# Patient Record
Sex: MALE | Race: WHITE | Employment: FULL TIME | URBAN - METROPOLITAN AREA
[De-identification: names, ages, dates, MRNs, and addresses within clinical notes are randomized per-mention and may not be internally consistent; named-entity substitution may affect disease eponyms.]

---

## 2016-06-15 LAB — DIABETIC RETINOPATHY: NORMAL

## 2017-02-21 RX ORDER — LISINOPRIL 2.5 MG/1
TABLET ORAL
Qty: 30 TABLET | Refills: 2 | Status: SHIPPED | OUTPATIENT
Start: 2017-02-21 | End: 2017-03-14 | Stop reason: SDUPTHER

## 2017-03-14 ENCOUNTER — OFFICE VISIT (OUTPATIENT)
Dept: INTERNAL MEDICINE CLINIC | Age: 52
End: 2017-03-14

## 2017-03-14 VITALS
OXYGEN SATURATION: 98 % | BODY MASS INDEX: 30.48 KG/M2 | RESPIRATION RATE: 17 BRPM | DIASTOLIC BLOOD PRESSURE: 76 MMHG | HEART RATE: 61 BPM | HEIGHT: 72 IN | WEIGHT: 225 LBS | SYSTOLIC BLOOD PRESSURE: 134 MMHG

## 2017-03-14 DIAGNOSIS — J45.20 MILD INTERMITTENT ASTHMA WITHOUT COMPLICATION: ICD-10-CM

## 2017-03-14 DIAGNOSIS — I10 ESSENTIAL HYPERTENSION: ICD-10-CM

## 2017-03-14 DIAGNOSIS — K76.0 FATTY LIVER DISEASE, NONALCOHOLIC: ICD-10-CM

## 2017-03-14 DIAGNOSIS — E11.21 DIABETES MELLITUS WITH NEPHROPATHY (HCC): Primary | ICD-10-CM

## 2017-03-14 PROCEDURE — 99214 OFFICE O/P EST MOD 30 MIN: CPT | Performed by: INTERNAL MEDICINE

## 2017-03-14 RX ORDER — ATENOLOL 25 MG/1
TABLET ORAL
Qty: 90 TABLET | Refills: 1 | Status: SHIPPED | OUTPATIENT
Start: 2017-03-14 | End: 2017-06-23 | Stop reason: SDUPTHER

## 2017-03-14 RX ORDER — LISINOPRIL 2.5 MG/1
TABLET ORAL
Qty: 90 TABLET | Refills: 1 | Status: SHIPPED | OUTPATIENT
Start: 2017-03-14 | End: 2017-06-23 | Stop reason: SDUPTHER

## 2017-03-14 ASSESSMENT — PATIENT HEALTH QUESTIONNAIRE - PHQ9
1. LITTLE INTEREST OR PLEASURE IN DOING THINGS: 0
SUM OF ALL RESPONSES TO PHQ QUESTIONS 1-9: 0
2. FEELING DOWN, DEPRESSED OR HOPELESS: 0
SUM OF ALL RESPONSES TO PHQ9 QUESTIONS 1 & 2: 0

## 2017-03-15 ENCOUNTER — TELEPHONE (OUTPATIENT)
Dept: INTERNAL MEDICINE CLINIC | Age: 52
End: 2017-03-15

## 2017-03-15 DIAGNOSIS — E11.21 DIABETES MELLITUS WITH NEPHROPATHY (HCC): ICD-10-CM

## 2017-03-15 DIAGNOSIS — K76.0 FATTY LIVER DISEASE, NONALCOHOLIC: ICD-10-CM

## 2017-03-15 LAB
A/G RATIO: 2 (ref 1.1–2.2)
ALBUMIN SERPL-MCNC: 4.7 G/DL (ref 3.4–5)
ALP BLD-CCNC: 97 U/L (ref 40–129)
ALT SERPL-CCNC: 58 U/L (ref 10–40)
ANION GAP SERPL CALCULATED.3IONS-SCNC: 12 MMOL/L (ref 3–16)
AST SERPL-CCNC: 27 U/L (ref 15–37)
BASOPHILS ABSOLUTE: 0.1 K/UL (ref 0–0.2)
BASOPHILS RELATIVE PERCENT: 1.3 %
BILIRUB SERPL-MCNC: 0.6 MG/DL (ref 0–1)
BUN BLDV-MCNC: 16 MG/DL (ref 7–20)
CALCIUM SERPL-MCNC: 9.8 MG/DL (ref 8.3–10.6)
CHLORIDE BLD-SCNC: 103 MMOL/L (ref 99–110)
CHOLESTEROL, TOTAL: 119 MG/DL (ref 0–199)
CO2: 28 MMOL/L (ref 21–32)
CREAT SERPL-MCNC: 1 MG/DL (ref 0.9–1.3)
CREATININE URINE: 60.9 MG/DL (ref 39–259)
EOSINOPHILS ABSOLUTE: 0.4 K/UL (ref 0–0.6)
EOSINOPHILS RELATIVE PERCENT: 6 %
GFR AFRICAN AMERICAN: >60
GFR NON-AFRICAN AMERICAN: >60
GLOBULIN: 2.4 G/DL
GLUCOSE BLD-MCNC: 138 MG/DL (ref 70–99)
HCT VFR BLD CALC: 49.2 % (ref 40.5–52.5)
HDLC SERPL-MCNC: 41 MG/DL (ref 40–60)
HEMOGLOBIN: 16.3 G/DL (ref 13.5–17.5)
LDL CHOLESTEROL CALCULATED: 56 MG/DL
LYMPHOCYTES ABSOLUTE: 2.1 K/UL (ref 1–5.1)
LYMPHOCYTES RELATIVE PERCENT: 29.5 %
MCH RBC QN AUTO: 30.8 PG (ref 26–34)
MCHC RBC AUTO-ENTMCNC: 33.2 G/DL (ref 31–36)
MCV RBC AUTO: 92.8 FL (ref 80–100)
MICROALBUMIN UR-MCNC: <1.2 MG/DL
MICROALBUMIN/CREAT UR-RTO: NORMAL MG/G (ref 0–30)
MONOCYTES ABSOLUTE: 0.6 K/UL (ref 0–1.3)
MONOCYTES RELATIVE PERCENT: 9 %
NEUTROPHILS ABSOLUTE: 3.8 K/UL (ref 1.7–7.7)
NEUTROPHILS RELATIVE PERCENT: 54.2 %
PDW BLD-RTO: 12.9 % (ref 12.4–15.4)
PLATELET # BLD: 240 K/UL (ref 135–450)
PMV BLD AUTO: 8.6 FL (ref 5–10.5)
POTASSIUM SERPL-SCNC: 5.1 MMOL/L (ref 3.5–5.1)
RBC # BLD: 5.3 M/UL (ref 4.2–5.9)
SODIUM BLD-SCNC: 143 MMOL/L (ref 136–145)
TOTAL PROTEIN: 7.1 G/DL (ref 6.4–8.2)
TRIGL SERPL-MCNC: 111 MG/DL (ref 0–150)
VLDLC SERPL CALC-MCNC: 22 MG/DL
WBC # BLD: 7 K/UL (ref 4–11)

## 2017-03-16 LAB
ESTIMATED AVERAGE GLUCOSE: 157.1 MG/DL
HBA1C MFR BLD: 7.1 %

## 2017-06-14 DIAGNOSIS — E11.21 DIABETES MELLITUS WITH NEPHROPATHY (HCC): ICD-10-CM

## 2017-06-23 DIAGNOSIS — I10 ESSENTIAL HYPERTENSION: ICD-10-CM

## 2017-06-23 DIAGNOSIS — E11.21 DIABETES MELLITUS WITH NEPHROPATHY (HCC): ICD-10-CM

## 2017-06-23 RX ORDER — LISINOPRIL 2.5 MG/1
TABLET ORAL
Qty: 90 TABLET | Refills: 1 | Status: SHIPPED | OUTPATIENT
Start: 2017-06-23 | End: 2017-11-01 | Stop reason: SDUPTHER

## 2017-06-23 RX ORDER — ATENOLOL 25 MG/1
TABLET ORAL
Qty: 90 TABLET | Refills: 1 | Status: SHIPPED | OUTPATIENT
Start: 2017-06-23 | End: 2017-11-01 | Stop reason: SDUPTHER

## 2017-10-30 ENCOUNTER — TELEPHONE (OUTPATIENT)
Dept: INTERNAL MEDICINE CLINIC | Age: 52
End: 2017-10-30

## 2017-10-30 DIAGNOSIS — E11.21 TYPE 2 DIABETES MELLITUS WITH DIABETIC NEPHROPATHY, WITHOUT LONG-TERM CURRENT USE OF INSULIN (HCC): ICD-10-CM

## 2017-10-30 DIAGNOSIS — E11.21 DIABETES MELLITUS WITH NEPHROPATHY (HCC): ICD-10-CM

## 2017-10-30 RX ORDER — GLIPIZIDE 5 MG/1
5 TABLET ORAL
Qty: 60 TABLET | Refills: 3
Start: 2017-10-30 | End: 2017-11-01 | Stop reason: SDUPTHER

## 2017-10-30 NOTE — TELEPHONE ENCOUNTER
As discussed w Renetta Cortez, advise pt to incr glucotrol fr 5daily to 5mg bid and metformin fr 500mg BID to 1000mg BID then need f/u appt this week in office

## 2017-11-01 ENCOUNTER — OFFICE VISIT (OUTPATIENT)
Dept: INTERNAL MEDICINE CLINIC | Age: 52
End: 2017-11-01

## 2017-11-01 VITALS
SYSTOLIC BLOOD PRESSURE: 122 MMHG | OXYGEN SATURATION: 93 % | RESPIRATION RATE: 16 BRPM | BODY MASS INDEX: 29.53 KG/M2 | WEIGHT: 218 LBS | HEIGHT: 72 IN | DIASTOLIC BLOOD PRESSURE: 78 MMHG | HEART RATE: 77 BPM

## 2017-11-01 DIAGNOSIS — I10 ESSENTIAL HYPERTENSION: ICD-10-CM

## 2017-11-01 DIAGNOSIS — J45.20 MILD INTERMITTENT ASTHMA WITHOUT COMPLICATION: ICD-10-CM

## 2017-11-01 DIAGNOSIS — E11.21 DIABETES MELLITUS WITH NEPHROPATHY (HCC): ICD-10-CM

## 2017-11-01 DIAGNOSIS — Z00.00 ROUTINE GENERAL MEDICAL EXAMINATION AT A HEALTH CARE FACILITY: Primary | ICD-10-CM

## 2017-11-01 DIAGNOSIS — E11.21 TYPE 2 DIABETES MELLITUS WITH DIABETIC NEPHROPATHY, WITHOUT LONG-TERM CURRENT USE OF INSULIN (HCC): ICD-10-CM

## 2017-11-01 DIAGNOSIS — Z23 NEED FOR IMMUNIZATION AGAINST INFLUENZA: ICD-10-CM

## 2017-11-01 DIAGNOSIS — K76.0 FATTY LIVER DISEASE, NONALCOHOLIC: ICD-10-CM

## 2017-11-01 LAB
A/G RATIO: 2.2 (CALC) (ref 0.8–2.6)
ALBUMIN SERPL-MCNC: 5 GM/DL (ref 3.5–5.2)
ALP BLD-CCNC: 89 U/L (ref 23–144)
ALT SERPL-CCNC: 47 U/L (ref 0–60)
AST SERPL-CCNC: 30 U/L (ref 0–46)
BASOPHILS ABSOLUTE: 0 K/MM3 (ref 0–0.3)
BASOPHILS RELATIVE PERCENT: 0.6 % (ref 0–2)
BILIRUB SERPL-MCNC: 0.7 MG/DL (ref 0–1.2)
BUN / CREAT RATIO: 17 (CALC) (ref 7–25)
BUN BLDV-MCNC: 17 MG/DL (ref 3–29)
CALCIUM SERPL-MCNC: 9.7 MG/DL (ref 8.5–10.5)
CHLORIDE BLD-SCNC: 99 MEQ/L (ref 96–110)
CHOLESTEROL, TOTAL: 118 MG/DL
CO2: 29 MEQ/L (ref 19–32)
COPY(IES) SENT TO:: NORMAL
CREAT SERPL-MCNC: 1 MG/DL
EOSINOPHILS ABSOLUTE: 0.2 K/MM3 (ref 0–0.6)
EOSINOPHILS RELATIVE PERCENT: 2.4 % (ref 0–7)
GFR SERPL CREATININE-BSD FRML MDRD: 88 ML/MIN/1.73M2
GLOBULIN: 2.3 GM/DL (CALC) (ref 1.9–3.6)
GLUCOSE BLD-MCNC: 110 MG/DL
GLUCOSE BLD-MCNC: 92 MG/DL
HBA1C MFR BLD: 6.6 %
HCT VFR BLD CALC: 48.8 % (ref 41–50)
HDLC SERPL-MCNC: 43 MG/DL
HEMOGLOBIN: 16.6 G/DL (ref 13.8–17.2)
LDL CHOLESTEROL: 65 MG/DL (CALC)
LEUKOCYTES, UA: 7.1 K/MM3 (ref 3.8–10.8)
LYMPHOCYTES ABSOLUTE: 1.8 K/MM3 (ref 0.9–4.1)
LYMPHOCYTES RELATIVE PERCENT: 25.3 % (ref 18–47)
MCH RBC QN AUTO: 30.6 PG (ref 27–33)
MCHC RBC AUTO-ENTMCNC: 34 G/DL (ref 32–36)
MCV RBC AUTO: 89.9 FL (ref 80–100)
MONOCYTES ABSOLUTE: 0.7 K/MM3 (ref 0.2–1.1)
MONOCYTES RELATIVE PERCENT: 9.2 % (ref 0–14)
NEUTROPHILS ABSOLUTE: 4.4 K/MM3 (ref 1.5–7.8)
PDW BLD-RTO: 12.5 % (ref 9–15)
PLATELET # BLD: 255 K/MM3 (ref 130–400)
POTASSIUM SERPL-SCNC: 4.7 MEQ/L (ref 3.4–5.3)
RBC # BLD: 5.43 M/MM3 (ref 4.4–5.8)
SEGMENTED NEUTROPHILS RELATIVE PERCENT: 62.5 % (ref 40–75)
SODIUM BLD-SCNC: 141 MEQ/L (ref 135–148)
TOTAL PROTEIN: 7.3 GM/DL (ref 6–8.3)
TRIGL SERPL-MCNC: 49 MG/DL
VLDLC SERPL CALC-MCNC: 10 MG/DL (CALC) (ref 4–32)

## 2017-11-01 PROCEDURE — 90471 IMMUNIZATION ADMIN: CPT | Performed by: INTERNAL MEDICINE

## 2017-11-01 PROCEDURE — 82962 GLUCOSE BLOOD TEST: CPT | Performed by: INTERNAL MEDICINE

## 2017-11-01 PROCEDURE — 99396 PREV VISIT EST AGE 40-64: CPT | Performed by: INTERNAL MEDICINE

## 2017-11-01 PROCEDURE — 90688 IIV4 VACCINE SPLT 0.5 ML IM: CPT | Performed by: INTERNAL MEDICINE

## 2017-11-01 PROCEDURE — 83036 HEMOGLOBIN GLYCOSYLATED A1C: CPT | Performed by: INTERNAL MEDICINE

## 2017-11-01 RX ORDER — ATENOLOL 25 MG/1
TABLET ORAL
Qty: 90 TABLET | Refills: 1 | Status: SHIPPED | OUTPATIENT
Start: 2017-11-01 | End: 2017-11-07 | Stop reason: SDUPTHER

## 2017-11-01 RX ORDER — ALBUTEROL SULFATE 90 UG/1
2 AEROSOL, METERED RESPIRATORY (INHALATION) EVERY 6 HOURS PRN
Qty: 1 INHALER | Refills: 3 | Status: SHIPPED | OUTPATIENT
Start: 2017-11-01 | End: 2018-12-07 | Stop reason: SDUPTHER

## 2017-11-01 RX ORDER — GLIPIZIDE 5 MG/1
2.5 TABLET ORAL DAILY PRN
Qty: 45 TABLET | Refills: 1 | Status: SHIPPED | OUTPATIENT
Start: 2017-11-01 | End: 2019-09-17 | Stop reason: SDUPTHER

## 2017-11-01 RX ORDER — LISINOPRIL 2.5 MG/1
TABLET ORAL
Qty: 90 TABLET | Refills: 1 | Status: SHIPPED | OUTPATIENT
Start: 2017-11-01 | End: 2017-11-07 | Stop reason: SDUPTHER

## 2017-11-01 NOTE — PROGRESS NOTES
rales.   Abdominal: Soft, non-tender. Bowel sounds and aorta are normal. There is no organomegaly, mass or bruit. Musculoskeletal: Normal range of motion, no synovitis. He exhibits no edema. Neurological: He is alert and oriented to person, place, and time. No cranial nerve deficit. Coordination normal.   Skin: Skin is warm, dry and intact. Psychiatric: He has a normal mood and affect. His speech is normal and behavior is normal. Judgment, cognition and memory are normal.         Assessment/Plan:    Christiano Guevara was seen today for diabetes. Diagnoses and all orders for this visit:    Routine general medical examination at a health care facility - Overall general medical exam appears benign, other assessments as noted and testing is as noted below. Type 2 diabetes mellitus with diabetic nephropathy, without long-term current use of insulin ([address]- OUR GLC AND A1C POCT AR OK, 92 AND 6.6. SUSPECT HIS GLUCOMETER NEEDS TO BE TITRATED AND HE WILL CHECK W HIS PHARMACY. F/U LAB TODAY, CONT CURRENT MED DOSING, ADVISED MORE STRICT ATTENTION TO DM DIET  -     POCT glycosylated hemoglobin (Hb A1C)  -     POCT Glucose  -     Lipid Panel  -     Comprehensive Metabolic Panel  -     CBC Auto Differential  -     metFORMIN (GLUCOPHAGE) 500 MG tablet; Take 1 tablet by mouth 2 times daily (with meals) TAKE ONE TABLET BY MOUTH TWICE A DAY (WITH MEALS)  -     glipiZIDE (GLUCOTROL) 5 MG tablet; Take 0.5 tablets by mouth daily as needed (for sugars >200)    Essential hypertension - Blood pressure stable and will continue current regimen. Will plan periodic monitoring of renal function, electrolytes, lipid profile. -     Comprehensive Metabolic Panel  -     CBC Auto Differential  -     lisinopril (PRINIVIL;ZESTRIL) 2.5 MG tablet; TAKE ONE TABLET BY MOUTH DAILY  -     atenolol (TENORMIN) 25 MG tablet; TAKE ONE TABLET BY MOUTH DAILY.     Fatty liver disease, nonalcoholic- MONITOR LFTS AND CONT LOW FAT DIET  -     Comprehensive

## 2017-11-07 DIAGNOSIS — I10 ESSENTIAL HYPERTENSION: ICD-10-CM

## 2017-11-07 DIAGNOSIS — E11.21 TYPE 2 DIABETES MELLITUS WITH DIABETIC NEPHROPATHY, WITHOUT LONG-TERM CURRENT USE OF INSULIN (HCC): ICD-10-CM

## 2017-11-07 DIAGNOSIS — E11.21 DIABETES MELLITUS WITH NEPHROPATHY (HCC): ICD-10-CM

## 2017-11-07 RX ORDER — ATENOLOL 25 MG/1
TABLET ORAL
Qty: 90 TABLET | Refills: 1 | Status: SHIPPED | OUTPATIENT
Start: 2017-11-07 | End: 2018-11-29 | Stop reason: SDUPTHER

## 2017-11-07 RX ORDER — LISINOPRIL 2.5 MG/1
TABLET ORAL
Qty: 90 TABLET | Refills: 1 | Status: SHIPPED | OUTPATIENT
Start: 2017-11-07 | End: 2018-05-17 | Stop reason: SDUPTHER

## 2018-02-01 ENCOUNTER — OFFICE VISIT (OUTPATIENT)
Dept: SURGERY | Age: 53
End: 2018-02-01

## 2018-02-01 VITALS
HEART RATE: 68 BPM | WEIGHT: 229 LBS | OXYGEN SATURATION: 93 % | BODY MASS INDEX: 31.06 KG/M2 | SYSTOLIC BLOOD PRESSURE: 110 MMHG | DIASTOLIC BLOOD PRESSURE: 60 MMHG

## 2018-02-01 DIAGNOSIS — L84 CALLUS OF FOOT: Primary | ICD-10-CM

## 2018-02-01 PROCEDURE — 99213 OFFICE O/P EST LOW 20 MIN: CPT | Performed by: SURGERY

## 2018-02-01 PROCEDURE — 1036F TOBACCO NON-USER: CPT | Performed by: SURGERY

## 2018-02-01 PROCEDURE — G8484 FLU IMMUNIZE NO ADMIN: HCPCS | Performed by: SURGERY

## 2018-02-01 PROCEDURE — G8427 DOCREV CUR MEDS BY ELIG CLIN: HCPCS | Performed by: SURGERY

## 2018-02-01 PROCEDURE — G8417 CALC BMI ABV UP PARAM F/U: HCPCS | Performed by: SURGERY

## 2018-02-01 PROCEDURE — 3017F COLORECTAL CA SCREEN DOC REV: CPT | Performed by: SURGERY

## 2018-02-01 ASSESSMENT — ENCOUNTER SYMPTOMS
HEARTBURN: 0
NAUSEA: 0
HEMOPTYSIS: 0
COUGH: 0

## 2018-05-02 ENCOUNTER — OFFICE VISIT (OUTPATIENT)
Dept: INTERNAL MEDICINE CLINIC | Age: 53
End: 2018-05-02

## 2018-05-02 VITALS
DIASTOLIC BLOOD PRESSURE: 82 MMHG | RESPIRATION RATE: 17 BRPM | SYSTOLIC BLOOD PRESSURE: 132 MMHG | HEART RATE: 76 BPM | WEIGHT: 215 LBS | BODY MASS INDEX: 29.16 KG/M2 | OXYGEN SATURATION: 98 %

## 2018-05-02 DIAGNOSIS — E11.21 TYPE 2 DIABETES MELLITUS WITH DIABETIC NEPHROPATHY, WITHOUT LONG-TERM CURRENT USE OF INSULIN (HCC): Primary | ICD-10-CM

## 2018-05-02 DIAGNOSIS — I10 ESSENTIAL HYPERTENSION: ICD-10-CM

## 2018-05-02 DIAGNOSIS — Z12.12 SCREENING FOR COLORECTAL CANCER: ICD-10-CM

## 2018-05-02 DIAGNOSIS — K76.0 FATTY LIVER DISEASE, NONALCOHOLIC: ICD-10-CM

## 2018-05-02 DIAGNOSIS — Z12.11 SCREENING FOR COLORECTAL CANCER: ICD-10-CM

## 2018-05-02 DIAGNOSIS — J45.20 MILD INTERMITTENT ASTHMA WITHOUT COMPLICATION: ICD-10-CM

## 2018-05-02 DIAGNOSIS — Z12.5 SCREENING FOR MALIGNANT NEOPLASM OF PROSTATE: ICD-10-CM

## 2018-05-02 PROCEDURE — 3017F COLORECTAL CA SCREEN DOC REV: CPT | Performed by: INTERNAL MEDICINE

## 2018-05-02 PROCEDURE — G8427 DOCREV CUR MEDS BY ELIG CLIN: HCPCS | Performed by: INTERNAL MEDICINE

## 2018-05-02 PROCEDURE — G8417 CALC BMI ABV UP PARAM F/U: HCPCS | Performed by: INTERNAL MEDICINE

## 2018-05-02 PROCEDURE — 2022F DILAT RTA XM EVC RTNOPTHY: CPT | Performed by: INTERNAL MEDICINE

## 2018-05-02 PROCEDURE — 3046F HEMOGLOBIN A1C LEVEL >9.0%: CPT | Performed by: INTERNAL MEDICINE

## 2018-05-02 PROCEDURE — 99214 OFFICE O/P EST MOD 30 MIN: CPT | Performed by: INTERNAL MEDICINE

## 2018-05-02 PROCEDURE — 1036F TOBACCO NON-USER: CPT | Performed by: INTERNAL MEDICINE

## 2018-05-02 ASSESSMENT — PATIENT HEALTH QUESTIONNAIRE - PHQ9
1. LITTLE INTEREST OR PLEASURE IN DOING THINGS: 0
2. FEELING DOWN, DEPRESSED OR HOPELESS: 0
1. LITTLE INTEREST OR PLEASURE IN DOING THINGS: 0
SUM OF ALL RESPONSES TO PHQ9 QUESTIONS 1 & 2: 0
SUM OF ALL RESPONSES TO PHQ QUESTIONS 1-9: 0
SUM OF ALL RESPONSES TO PHQ QUESTIONS 1-9: 0
SUM OF ALL RESPONSES TO PHQ9 QUESTIONS 1 & 2: 0

## 2018-05-03 DIAGNOSIS — Z12.5 SCREENING FOR MALIGNANT NEOPLASM OF PROSTATE: ICD-10-CM

## 2018-05-03 DIAGNOSIS — E11.21 TYPE 2 DIABETES MELLITUS WITH DIABETIC NEPHROPATHY, WITHOUT LONG-TERM CURRENT USE OF INSULIN (HCC): ICD-10-CM

## 2018-05-03 DIAGNOSIS — I10 ESSENTIAL HYPERTENSION: ICD-10-CM

## 2018-05-04 LAB
A/G RATIO: 2 (ref 1.1–2.2)
ALBUMIN SERPL-MCNC: 4.7 G/DL (ref 3.4–5)
ALP BLD-CCNC: 89 U/L (ref 40–129)
ALT SERPL-CCNC: 40 U/L (ref 10–40)
ANION GAP SERPL CALCULATED.3IONS-SCNC: 12 MMOL/L (ref 3–16)
AST SERPL-CCNC: 29 U/L (ref 15–37)
BASOPHILS ABSOLUTE: 0.1 K/UL (ref 0–0.2)
BASOPHILS RELATIVE PERCENT: 1 %
BILIRUB SERPL-MCNC: 0.6 MG/DL (ref 0–1)
BUN BLDV-MCNC: 15 MG/DL (ref 7–20)
CALCIUM SERPL-MCNC: 9.5 MG/DL (ref 8.3–10.6)
CHLORIDE BLD-SCNC: 103 MMOL/L (ref 99–110)
CHOLESTEROL, TOTAL: 112 MG/DL (ref 0–199)
CO2: 27 MMOL/L (ref 21–32)
CREAT SERPL-MCNC: 1 MG/DL (ref 0.9–1.3)
CREATININE URINE: 77.3 MG/DL (ref 39–259)
EOSINOPHILS ABSOLUTE: 0.3 K/UL (ref 0–0.6)
EOSINOPHILS RELATIVE PERCENT: 4.5 %
ESTIMATED AVERAGE GLUCOSE: 165.7 MG/DL
GFR AFRICAN AMERICAN: >60
GFR NON-AFRICAN AMERICAN: >60
GLOBULIN: 2.4 G/DL
GLUCOSE BLD-MCNC: 111 MG/DL (ref 70–99)
HBA1C MFR BLD: 7.4 %
HCT VFR BLD CALC: 48.6 % (ref 40.5–52.5)
HDLC SERPL-MCNC: 46 MG/DL (ref 40–60)
HEMOGLOBIN: 16.3 G/DL (ref 13.5–17.5)
LDL CHOLESTEROL CALCULATED: 57 MG/DL
LYMPHOCYTES ABSOLUTE: 1.7 K/UL (ref 1–5.1)
LYMPHOCYTES RELATIVE PERCENT: 28.1 %
MAGNESIUM: 2.4 MG/DL (ref 1.8–2.4)
MCH RBC QN AUTO: 31.2 PG (ref 26–34)
MCHC RBC AUTO-ENTMCNC: 33.5 G/DL (ref 31–36)
MCV RBC AUTO: 93.2 FL (ref 80–100)
MICROALBUMIN UR-MCNC: <1.2 MG/DL
MICROALBUMIN/CREAT UR-RTO: NORMAL MG/G (ref 0–30)
MONOCYTES ABSOLUTE: 0.5 K/UL (ref 0–1.3)
MONOCYTES RELATIVE PERCENT: 8.7 %
NEUTROPHILS ABSOLUTE: 3.5 K/UL (ref 1.7–7.7)
NEUTROPHILS RELATIVE PERCENT: 57.7 %
PDW BLD-RTO: 13.2 % (ref 12.4–15.4)
PLATELET # BLD: 244 K/UL (ref 135–450)
PMV BLD AUTO: 8.4 FL (ref 5–10.5)
POTASSIUM SERPL-SCNC: 5 MMOL/L (ref 3.5–5.1)
PROSTATE SPECIFIC ANTIGEN: 1.28 NG/ML (ref 0–4)
RBC # BLD: 5.21 M/UL (ref 4.2–5.9)
SODIUM BLD-SCNC: 142 MMOL/L (ref 136–145)
TOTAL PROTEIN: 7.1 G/DL (ref 6.4–8.2)
TRIGL SERPL-MCNC: 46 MG/DL (ref 0–150)
VLDLC SERPL CALC-MCNC: 9 MG/DL
WBC # BLD: 6.1 K/UL (ref 4–11)

## 2018-05-17 DIAGNOSIS — E11.21 DIABETES MELLITUS WITH NEPHROPATHY (HCC): ICD-10-CM

## 2018-05-17 DIAGNOSIS — I10 ESSENTIAL HYPERTENSION: ICD-10-CM

## 2018-05-17 RX ORDER — LISINOPRIL 2.5 MG/1
TABLET ORAL
Qty: 90 TABLET | Refills: 1 | Status: SHIPPED | OUTPATIENT
Start: 2018-05-17 | End: 2018-12-07 | Stop reason: SDUPTHER

## 2018-11-28 DIAGNOSIS — E11.21 TYPE 2 DIABETES MELLITUS WITH DIABETIC NEPHROPATHY, WITHOUT LONG-TERM CURRENT USE OF INSULIN (HCC): ICD-10-CM

## 2018-11-29 DIAGNOSIS — I10 ESSENTIAL HYPERTENSION: ICD-10-CM

## 2018-11-29 RX ORDER — ATENOLOL 25 MG/1
TABLET ORAL
Qty: 30 TABLET | Refills: 0 | Status: SHIPPED | OUTPATIENT
Start: 2018-11-29 | End: 2018-12-07 | Stop reason: SDUPTHER

## 2018-12-07 ENCOUNTER — OFFICE VISIT (OUTPATIENT)
Dept: INTERNAL MEDICINE CLINIC | Age: 53
End: 2018-12-07
Payer: COMMERCIAL

## 2018-12-07 VITALS
BODY MASS INDEX: 29.93 KG/M2 | DIASTOLIC BLOOD PRESSURE: 80 MMHG | RESPIRATION RATE: 16 BRPM | OXYGEN SATURATION: 98 % | HEIGHT: 72 IN | SYSTOLIC BLOOD PRESSURE: 112 MMHG | WEIGHT: 221 LBS | HEART RATE: 76 BPM

## 2018-12-07 DIAGNOSIS — Z12.12 SCREENING FOR COLORECTAL CANCER: ICD-10-CM

## 2018-12-07 DIAGNOSIS — I10 ESSENTIAL HYPERTENSION: ICD-10-CM

## 2018-12-07 DIAGNOSIS — Z12.11 SCREENING FOR COLORECTAL CANCER: ICD-10-CM

## 2018-12-07 DIAGNOSIS — J45.20 MILD INTERMITTENT ASTHMA WITHOUT COMPLICATION: ICD-10-CM

## 2018-12-07 DIAGNOSIS — Z00.00 ROUTINE GENERAL MEDICAL EXAMINATION AT A HEALTH CARE FACILITY: Primary | ICD-10-CM

## 2018-12-07 DIAGNOSIS — E11.21 TYPE 2 DIABETES MELLITUS WITH DIABETIC NEPHROPATHY, WITHOUT LONG-TERM CURRENT USE OF INSULIN (HCC): ICD-10-CM

## 2018-12-07 DIAGNOSIS — E11.21 DIABETES MELLITUS WITH NEPHROPATHY (HCC): ICD-10-CM

## 2018-12-07 LAB
A/G RATIO: 1.6 (ref 1.1–2.2)
ALBUMIN SERPL-MCNC: 4.7 G/DL (ref 3.4–5)
ALP BLD-CCNC: 118 U/L (ref 40–129)
ALT SERPL-CCNC: 27 U/L (ref 10–40)
ANION GAP SERPL CALCULATED.3IONS-SCNC: 11 MMOL/L (ref 3–16)
AST SERPL-CCNC: 19 U/L (ref 15–37)
BASOPHILS ABSOLUTE: 0.1 K/UL (ref 0–0.2)
BASOPHILS RELATIVE PERCENT: 1.2 %
BILIRUB SERPL-MCNC: 0.5 MG/DL (ref 0–1)
BUN BLDV-MCNC: 11 MG/DL (ref 7–20)
CALCIUM SERPL-MCNC: 10 MG/DL (ref 8.3–10.6)
CHLORIDE BLD-SCNC: 102 MMOL/L (ref 99–110)
CHOLESTEROL, TOTAL: 120 MG/DL (ref 0–199)
CO2: 29 MMOL/L (ref 21–32)
CREAT SERPL-MCNC: 0.9 MG/DL (ref 0.9–1.3)
CREATININE URINE: 73.3 MG/DL (ref 39–259)
EOSINOPHILS ABSOLUTE: 0.3 K/UL (ref 0–0.6)
EOSINOPHILS RELATIVE PERCENT: 4.6 %
GFR AFRICAN AMERICAN: >60
GFR NON-AFRICAN AMERICAN: >60
GLOBULIN: 2.9 G/DL
GLUCOSE BLD-MCNC: 178 MG/DL (ref 70–99)
HCT VFR BLD CALC: 46.3 % (ref 40.5–52.5)
HDLC SERPL-MCNC: 38 MG/DL (ref 40–60)
HEMOGLOBIN: 15.9 G/DL (ref 13.5–17.5)
LDL CHOLESTEROL CALCULATED: 60 MG/DL
LYMPHOCYTES ABSOLUTE: 2.2 K/UL (ref 1–5.1)
LYMPHOCYTES RELATIVE PERCENT: 30.6 %
MCH RBC QN AUTO: 31 PG (ref 26–34)
MCHC RBC AUTO-ENTMCNC: 34.3 G/DL (ref 31–36)
MCV RBC AUTO: 90.4 FL (ref 80–100)
MICROALBUMIN UR-MCNC: <1.2 MG/DL
MICROALBUMIN/CREAT UR-RTO: NORMAL MG/G (ref 0–30)
MONOCYTES ABSOLUTE: 0.6 K/UL (ref 0–1.3)
MONOCYTES RELATIVE PERCENT: 8.3 %
NEUTROPHILS ABSOLUTE: 3.9 K/UL (ref 1.7–7.7)
NEUTROPHILS RELATIVE PERCENT: 55.3 %
PDW BLD-RTO: 12.4 % (ref 12.4–15.4)
PLATELET # BLD: 269 K/UL (ref 135–450)
PMV BLD AUTO: 8.6 FL (ref 5–10.5)
POTASSIUM SERPL-SCNC: 4.9 MMOL/L (ref 3.5–5.1)
RBC # BLD: 5.13 M/UL (ref 4.2–5.9)
SODIUM BLD-SCNC: 142 MMOL/L (ref 136–145)
TOTAL PROTEIN: 7.6 G/DL (ref 6.4–8.2)
TRIGL SERPL-MCNC: 110 MG/DL (ref 0–150)
VLDLC SERPL CALC-MCNC: 22 MG/DL
WBC # BLD: 7.1 K/UL (ref 4–11)

## 2018-12-07 PROCEDURE — G8484 FLU IMMUNIZE NO ADMIN: HCPCS | Performed by: INTERNAL MEDICINE

## 2018-12-07 PROCEDURE — 83036 HEMOGLOBIN GLYCOSYLATED A1C: CPT | Performed by: INTERNAL MEDICINE

## 2018-12-07 PROCEDURE — 99396 PREV VISIT EST AGE 40-64: CPT | Performed by: INTERNAL MEDICINE

## 2018-12-07 RX ORDER — ALBUTEROL SULFATE 90 UG/1
2 AEROSOL, METERED RESPIRATORY (INHALATION) EVERY 6 HOURS PRN
Qty: 1 INHALER | Refills: 3 | Status: SHIPPED | OUTPATIENT
Start: 2018-12-07 | End: 2020-02-28 | Stop reason: SDUPTHER

## 2018-12-07 RX ORDER — ATENOLOL 25 MG/1
TABLET ORAL
Qty: 90 TABLET | Refills: 1 | Status: SHIPPED | OUTPATIENT
Start: 2018-12-07 | End: 2019-08-27 | Stop reason: SDUPTHER

## 2018-12-07 RX ORDER — LISINOPRIL 2.5 MG/1
TABLET ORAL
Qty: 90 TABLET | Refills: 1 | Status: SHIPPED | OUTPATIENT
Start: 2018-12-07 | End: 2019-08-27 | Stop reason: SDUPTHER

## 2018-12-07 NOTE — PROGRESS NOTES
History and Physical      Harini Pickard  YOB: 1965    Date of Service:  12/7/2018    Chief Complaint:   Harini Pickard is a 48 y.o. male who presents for complete physical examination. HPI:   Now working at [address] [place] [address]. DM- wt up slightly, was off meds earlier this mo w juicing but now back on meds. Seeing [facility] annually. Lab Results   Component Value Date    LABA1C 7.4 05/03/2018    LABA1C 6.6 11/01/2017    LABA1C 7.1 03/15/2017     Lab Results   Component Value Date    LABMICR <1.20 05/03/2018    LDLCALC 57 05/03/2018    CREATININE 1.0 05/03/2018     Hypertension: Stable. Denies CP, SOB, cough, visual changes, dizziness, palpitations or HA. Defers colonoscopy d/t work sched but will do stool cards. Asthma:  Patient denies significant chest pain/tightness, dyspnea, decreased exercise tolerance, cough, or wheezing on current regimen.       Wt Readings from Last 3 Encounters:   12/07/18 221 lb (100.2 kg)   05/02/18 215 lb (97.5 kg)   02/01/18 229 lb (103.9 kg)     BP Readings from Last 3 Encounters:   12/07/18 112/80   05/02/18 132/82   02/01/18 110/60       Patient Active Problem List   Diagnosis    Hypertension    Fatty liver disease, nonalcoholic    Asthma    Type 2 diabetes mellitus with diabetic nephropathy, without long-term current use of insulin [facility])       Preventive Care:  Health Maintenance   Topic Date Due    Diabetic retinal exam  11/09/1977    HIV screen  11/09/1982    DTaP/Tdap/Td vaccine (1 - Tdap) 11/09/1986    Shingles Vaccine (1 of 2 - 2 Dose Series) 11/09/2017    Colon cancer screen colonoscopy  11/09/2017    Diabetic foot exam  03/14/2018    A1C test (Diabetic or Prediabetic)  05/03/2019    Lipid screen  05/03/2019    Potassium monitoring  05/03/2019    Creatinine monitoring  05/03/2019    Flu vaccine  Completed    Pneumococcal med risk  Completed        Lipid panel:    Lab Results   Component Value Date    TRIG 46 05/03/2018    HDL 46 05/03/2018

## 2018-12-10 DIAGNOSIS — E11.21 TYPE 2 DIABETES MELLITUS WITH DIABETIC NEPHROPATHY, WITHOUT LONG-TERM CURRENT USE OF INSULIN (HCC): ICD-10-CM

## 2018-12-10 DIAGNOSIS — E11.21 TYPE 2 DIABETES MELLITUS WITH DIABETIC NEPHROPATHY, WITHOUT LONG-TERM CURRENT USE OF INSULIN (HCC): Primary | ICD-10-CM

## 2018-12-10 LAB
ESTIMATED AVERAGE GLUCOSE: 194.4 MG/DL
HBA1C MFR BLD: 8.4 %

## 2019-08-27 DIAGNOSIS — I10 ESSENTIAL HYPERTENSION: ICD-10-CM

## 2019-08-27 DIAGNOSIS — E11.21 DIABETES MELLITUS WITH NEPHROPATHY (HCC): ICD-10-CM

## 2019-08-27 DIAGNOSIS — E11.21 TYPE 2 DIABETES MELLITUS WITH DIABETIC NEPHROPATHY, WITHOUT LONG-TERM CURRENT USE OF INSULIN (HCC): ICD-10-CM

## 2019-08-27 RX ORDER — LISINOPRIL 2.5 MG/1
2.5 TABLET ORAL DAILY
Qty: 30 TABLET | Refills: 0 | Status: SHIPPED | OUTPATIENT
Start: 2019-08-27 | End: 2019-08-28 | Stop reason: SDUPTHER

## 2019-08-27 RX ORDER — ATENOLOL 25 MG/1
25 TABLET ORAL DAILY
Qty: 30 TABLET | Refills: 0 | Status: SHIPPED | OUTPATIENT
Start: 2019-08-27 | End: 2019-08-28 | Stop reason: SDUPTHER

## 2019-08-28 ENCOUNTER — OFFICE VISIT (OUTPATIENT)
Dept: INTERNAL MEDICINE CLINIC | Age: 54
End: 2019-08-28
Payer: COMMERCIAL

## 2019-08-28 VITALS
HEART RATE: 78 BPM | RESPIRATION RATE: 17 BRPM | SYSTOLIC BLOOD PRESSURE: 122 MMHG | BODY MASS INDEX: 29.57 KG/M2 | OXYGEN SATURATION: 97 % | WEIGHT: 218 LBS | DIASTOLIC BLOOD PRESSURE: 85 MMHG

## 2019-08-28 DIAGNOSIS — Z12.12 SCREENING FOR COLORECTAL CANCER: ICD-10-CM

## 2019-08-28 DIAGNOSIS — Z12.5 SCREENING FOR MALIGNANT NEOPLASM OF PROSTATE: ICD-10-CM

## 2019-08-28 DIAGNOSIS — I10 ESSENTIAL HYPERTENSION: ICD-10-CM

## 2019-08-28 DIAGNOSIS — E11.21 TYPE 2 DIABETES MELLITUS WITH DIABETIC NEPHROPATHY, WITHOUT LONG-TERM CURRENT USE OF INSULIN (HCC): ICD-10-CM

## 2019-08-28 DIAGNOSIS — J45.20 MILD INTERMITTENT ASTHMA WITHOUT COMPLICATION: ICD-10-CM

## 2019-08-28 DIAGNOSIS — E11.21 DIABETES MELLITUS WITH NEPHROPATHY (HCC): ICD-10-CM

## 2019-08-28 DIAGNOSIS — Z00.00 ROUTINE GENERAL MEDICAL EXAMINATION AT A HEALTH CARE FACILITY: Primary | ICD-10-CM

## 2019-08-28 DIAGNOSIS — Z12.11 SCREENING FOR COLORECTAL CANCER: ICD-10-CM

## 2019-08-28 LAB
A/G RATIO: 2 (ref 1.1–2.2)
ALBUMIN SERPL-MCNC: 4.9 G/DL (ref 3.4–5)
ALP BLD-CCNC: 107 U/L (ref 40–129)
ALT SERPL-CCNC: 39 U/L (ref 10–40)
ANION GAP SERPL CALCULATED.3IONS-SCNC: 16 MMOL/L (ref 3–16)
AST SERPL-CCNC: 26 U/L (ref 15–37)
BASOPHILS ABSOLUTE: 0.1 K/UL (ref 0–0.2)
BASOPHILS RELATIVE PERCENT: 1.2 %
BILIRUB SERPL-MCNC: 0.5 MG/DL (ref 0–1)
BUN BLDV-MCNC: 11 MG/DL (ref 7–20)
CALCIUM SERPL-MCNC: 9.7 MG/DL (ref 8.3–10.6)
CHLORIDE BLD-SCNC: 100 MMOL/L (ref 99–110)
CHOLESTEROL, TOTAL: 108 MG/DL (ref 0–199)
CO2: 24 MMOL/L (ref 21–32)
CREAT SERPL-MCNC: 1 MG/DL (ref 0.9–1.3)
CREATININE URINE: 353 MG/DL (ref 39–259)
EOSINOPHILS ABSOLUTE: 0.1 K/UL (ref 0–0.6)
EOSINOPHILS RELATIVE PERCENT: 2.3 %
GFR AFRICAN AMERICAN: >60
GFR NON-AFRICAN AMERICAN: >60
GLOBULIN: 2.5 G/DL
GLUCOSE BLD-MCNC: 160 MG/DL (ref 70–99)
HCT VFR BLD CALC: 43.6 % (ref 40.5–52.5)
HDLC SERPL-MCNC: 43 MG/DL (ref 40–60)
HEMOGLOBIN: 15.4 G/DL (ref 13.5–17.5)
LDL CHOLESTEROL CALCULATED: 55 MG/DL
LYMPHOCYTES ABSOLUTE: 1.6 K/UL (ref 1–5.1)
LYMPHOCYTES RELATIVE PERCENT: 27.9 %
MCH RBC QN AUTO: 31.5 PG (ref 26–34)
MCHC RBC AUTO-ENTMCNC: 35.2 G/DL (ref 31–36)
MCV RBC AUTO: 89.5 FL (ref 80–100)
MICROALBUMIN UR-MCNC: 13.9 MG/DL
MICROALBUMIN/CREAT UR-RTO: 39.4 MG/G (ref 0–30)
MONOCYTES ABSOLUTE: 0.6 K/UL (ref 0–1.3)
MONOCYTES RELATIVE PERCENT: 9.8 %
NEUTROPHILS ABSOLUTE: 3.3 K/UL (ref 1.7–7.7)
NEUTROPHILS RELATIVE PERCENT: 58.8 %
PDW BLD-RTO: 12.9 % (ref 12.4–15.4)
PLATELET # BLD: 230 K/UL (ref 135–450)
PMV BLD AUTO: 8.6 FL (ref 5–10.5)
POTASSIUM SERPL-SCNC: 4.5 MMOL/L (ref 3.5–5.1)
PROSTATE SPECIFIC ANTIGEN: 1.03 NG/ML (ref 0–4)
RBC # BLD: 4.88 M/UL (ref 4.2–5.9)
SODIUM BLD-SCNC: 140 MMOL/L (ref 136–145)
TOTAL PROTEIN: 7.4 G/DL (ref 6.4–8.2)
TRIGL SERPL-MCNC: 49 MG/DL (ref 0–150)
VLDLC SERPL CALC-MCNC: 10 MG/DL
WBC # BLD: 5.7 K/UL (ref 4–11)

## 2019-08-28 PROCEDURE — 99396 PREV VISIT EST AGE 40-64: CPT | Performed by: INTERNAL MEDICINE

## 2019-08-28 RX ORDER — ATENOLOL 25 MG/1
25 TABLET ORAL DAILY
Qty: 90 TABLET | Refills: 1 | Status: SHIPPED | OUTPATIENT
Start: 2019-08-28 | End: 2019-08-28 | Stop reason: SDUPTHER

## 2019-08-28 RX ORDER — LISINOPRIL 2.5 MG/1
2.5 TABLET ORAL DAILY
Qty: 90 TABLET | Refills: 1 | Status: SHIPPED | OUTPATIENT
Start: 2019-08-28 | End: 2019-08-28 | Stop reason: SDUPTHER

## 2019-08-28 RX ORDER — LISINOPRIL 2.5 MG/1
2.5 TABLET ORAL DAILY
Qty: 90 TABLET | Refills: 1 | Status: SHIPPED | OUTPATIENT
Start: 2019-08-28 | End: 2020-02-28 | Stop reason: SDUPTHER

## 2019-08-28 RX ORDER — ATENOLOL 25 MG/1
25 TABLET ORAL DAILY
Qty: 90 TABLET | Refills: 1 | Status: SHIPPED | OUTPATIENT
Start: 2019-08-28 | End: 2020-02-28 | Stop reason: SDUPTHER

## 2019-08-28 ASSESSMENT — PATIENT HEALTH QUESTIONNAIRE - PHQ9
SUM OF ALL RESPONSES TO PHQ9 QUESTIONS 1 & 2: 0
DEPRESSION UNABLE TO ASSESS: FUNCTIONAL CAPACITY MOTIVATION LIMITS ACCURACY
2. FEELING DOWN, DEPRESSED OR HOPELESS: 0
1. LITTLE INTEREST OR PLEASURE IN DOING THINGS: 0
SUM OF ALL RESPONSES TO PHQ QUESTIONS 1-9: 0
SUM OF ALL RESPONSES TO PHQ QUESTIONS 1-9: 0

## 2019-08-28 NOTE — PROGRESS NOTES
Age of Onset    High Cholesterol Mother     High Blood Pressure Mother     Diabetes Mother     Heart Disease Mother         CAD?  Thyroid Disease Mother         Hypothyroid     Social History     Socioeconomic History    Marital status:      Spouse name: Not on file    Number of children: Not on file    Years of education: Not on file    Highest education level: Not on file   Occupational History    Occupation: Truck Movers     Comment:     Occupation: [employer]   Social Needs    Financial resource strain: Not on file    Food insecurity:     Worry: Not on file     Inability: Not on file   Odysii needs:     Medical: Not on file     Non-medical: Not on file   Tobacco Use    Smoking status: Former Smoker     Packs/day: 0.50     Years: 10.00     Pack years: 5.00     Types: Cigarettes    Smokeless tobacco: Former User     Types: Chew    Tobacco comment: E Cig on regular basis   Substance and Sexual Activity    Alcohol use:  Yes     Alcohol/week: 0.0 standard drinks     Comment: Occasional     Drug use: Not on file    Sexual activity: Not on file   Lifestyle    Physical activity:     Days per week: Not on file     Minutes per session: Not on file    Stress: Not on file   Relationships    Social connections:     Talks on phone: Not on file     Gets together: Not on file     Attends [employer] service: Not on file     Active member of club or organization: Not on file     Attends meetings of clubs or organizations: Not on file     Relationship status: Not on file    Intimate partner violence:     Fear of current or ex partner: Not on file     Emotionally abused: Not on file     Physically abused: Not on file     Forced sexual activity: Not on file   Other Topics Concern    Not on file   Social History Narrative    Diet: unrestricted    Exercise: A lot    Seat Belt: Mostly       Review of Systems:  A comprehensive review of systems was negative except for what was noted in the DM relatively well controlled and will continue current regimen. Screening reviewed. See orders. -     metFORMIN (GLUCOPHAGE) 500 MG tablet; Take 1 tablet by mouth 2 times daily (with meals)  -     CBC Auto Differential; Future  -     Comprehensive Metabolic Panel; Future  -     Lipid Panel; Future  -      DIABETES FOOT EXAM  -     Hemoglobin A1C; Future  -     Microalbumin / Creatinine Urine Ratio; Future    Diabetes mellitus with nephropathy ([address]- cont acei and hydration, monitoring urine protein  -     lisinopril (PRINIVIL;ZESTRIL) 2.5 MG tablet; Take 1 tablet by mouth daily    Essential hypertension - Blood pressure stable and will continue current regimen. Will plan periodic monitoring of renal function, electrolytes, lipid profile.     -     atenolol (TENORMIN) 25 MG tablet; Take 1 tablet by mouth daily  -     lisinopril (PRINIVIL;ZESTRIL) 2.5 MG tablet; Take 1 tablet by mouth daily  -     CBC Auto Differential; Future    Screening for colorectal cancer- scr due and defers colonoscopy  -     BLOOD OCCULT STOOL #1; Future    Mild intermittent asthma without complication - Asthma clinically stable w/o ongoing symptoms, will continue inhaler/medical regimen. Screening for malignant neoplasm of prostate  -     PSA PROSTATIC SPECIFIC ANTIGEN;  Future

## 2019-08-29 DIAGNOSIS — E11.21 DIABETES MELLITUS WITH NEPHROPATHY (HCC): Primary | ICD-10-CM

## 2019-08-29 LAB
ESTIMATED AVERAGE GLUCOSE: 226 MG/DL
HBA1C MFR BLD: 9.5 %

## 2019-09-17 DIAGNOSIS — E11.21 TYPE 2 DIABETES MELLITUS WITH DIABETIC NEPHROPATHY, WITHOUT LONG-TERM CURRENT USE OF INSULIN (HCC): ICD-10-CM

## 2019-09-17 RX ORDER — LANCETS
1 EACH MISCELLANEOUS DAILY
Qty: 100 EACH | Refills: 3 | Status: SHIPPED | OUTPATIENT
Start: 2019-09-17 | End: 2021-07-27

## 2019-09-17 RX ORDER — GLIPIZIDE 5 MG/1
2.5 TABLET ORAL DAILY PRN
Qty: 45 TABLET | Refills: 1 | Status: SHIPPED | OUTPATIENT
Start: 2019-09-17 | End: 2020-02-28

## 2019-09-18 RX ORDER — FLASH GLUCOSE SENSOR
1 KIT MISCELLANEOUS DAILY
Qty: 1 DEVICE | Refills: 0 | Status: SHIPPED | OUTPATIENT
Start: 2019-09-18 | End: 2020-02-28

## 2019-09-18 RX ORDER — FLASH GLUCOSE SENSOR
1 KIT MISCELLANEOUS DAILY
Qty: 1 EACH | Refills: 5 | Status: SHIPPED | OUTPATIENT
Start: 2019-09-18 | End: 2021-07-27

## 2019-09-23 RX ORDER — NICOTINE 21 MG/24HR
1 PATCH, TRANSDERMAL 24 HOURS TRANSDERMAL DAILY
Qty: 30 PATCH | Refills: 2 | Status: SHIPPED | OUTPATIENT
Start: 2019-09-23 | End: 2021-07-27

## 2020-02-12 ENCOUNTER — OFFICE VISIT (OUTPATIENT)
Dept: FAMILY MEDICINE CLINIC | Age: 55
End: 2020-02-12
Payer: COMMERCIAL

## 2020-02-12 VITALS
BODY MASS INDEX: 28.1 KG/M2 | HEART RATE: 118 BPM | HEIGHT: 73 IN | SYSTOLIC BLOOD PRESSURE: 116 MMHG | OXYGEN SATURATION: 97 % | WEIGHT: 212 LBS | DIASTOLIC BLOOD PRESSURE: 74 MMHG | TEMPERATURE: 100 F

## 2020-02-12 LAB
INFLUENZA VIRUS A RNA: POSITIVE
INFLUENZA VIRUS B RNA: NEGATIVE

## 2020-02-12 PROCEDURE — 87502 INFLUENZA DNA AMP PROBE: CPT | Performed by: NURSE PRACTITIONER

## 2020-02-12 PROCEDURE — 99213 OFFICE O/P EST LOW 20 MIN: CPT | Performed by: NURSE PRACTITIONER

## 2020-02-12 PROCEDURE — 1036F TOBACCO NON-USER: CPT | Performed by: NURSE PRACTITIONER

## 2020-02-12 PROCEDURE — G8419 CALC BMI OUT NRM PARAM NOF/U: HCPCS | Performed by: NURSE PRACTITIONER

## 2020-02-12 PROCEDURE — 3017F COLORECTAL CA SCREEN DOC REV: CPT | Performed by: NURSE PRACTITIONER

## 2020-02-12 PROCEDURE — G8427 DOCREV CUR MEDS BY ELIG CLIN: HCPCS | Performed by: NURSE PRACTITIONER

## 2020-02-12 PROCEDURE — G8484 FLU IMMUNIZE NO ADMIN: HCPCS | Performed by: NURSE PRACTITIONER

## 2020-02-12 RX ORDER — BENZONATATE 100 MG/1
100 CAPSULE ORAL 3 TIMES DAILY PRN
Qty: 20 CAPSULE | Refills: 0 | Status: SHIPPED | OUTPATIENT
Start: 2020-02-12 | End: 2021-07-27

## 2020-02-12 RX ORDER — OSELTAMIVIR PHOSPHATE 75 MG/1
75 CAPSULE ORAL 2 TIMES DAILY
Qty: 10 CAPSULE | Refills: 0 | Status: SHIPPED | OUTPATIENT
Start: 2020-02-12 | End: 2020-02-17

## 2020-02-12 ASSESSMENT — ENCOUNTER SYMPTOMS
COUGH: 1
VOMITING: 0
NAUSEA: 0
SHORTNESS OF BREATH: 1
RHINORRHEA: 0
CHEST TIGHTNESS: 0
HEMOPTYSIS: 0
HEARTBURN: 0
DIARRHEA: 0
SINUS PRESSURE: 1
WHEEZING: 1
SINUS PAIN: 1
SORE THROAT: 1

## 2020-02-12 NOTE — PROGRESS NOTES
Zuri Blanton   48 y.o.  male  [id number]      Chief Complaint   Patient presents with    Cough     c/o cough sore throat fever muscle aches and fatigue        Subjective:  54 y.o.male is here for a follow up. He has the following chronic/acute medical problems:  Patient Active Problem List   Diagnosis    Hypertension    Fatty liver disease, nonalcoholic    Asthma    Type 2 diabetes mellitus with diabetic nephropathy, without long-term current use of insulin ([address]       Cough   This is a new problem. The current episode started yesterday. The problem has been gradually worsening. Episode frequency: intermittantly. The cough is non-productive. Associated symptoms include chills, a fever, headaches, myalgias, a sore throat, shortness of breath and wheezing. Pertinent negatives include no chest pain, ear congestion, ear pain, heartburn, hemoptysis, nasal congestion, postnasal drip, rash, rhinorrhea, sweats or weight loss. Nothing aggravates the symptoms. Treatments tried: Alkazeltzer OTC. The treatment provided no relief. His past medical history is significant for asthma. Review of Systems   Constitutional: Positive for appetite change (decreased), chills, fatigue and fever. Negative for weight loss. HENT: Positive for congestion, sinus pressure, sinus pain and sore throat. Negative for ear pain, postnasal drip, rhinorrhea and sneezing. Respiratory: Positive for cough, shortness of breath and wheezing. Negative for hemoptysis and chest tightness. Cardiovascular: Negative for chest pain and palpitations. Gastrointestinal: Negative for diarrhea, heartburn, nausea and vomiting. Musculoskeletal: Positive for myalgias. Skin: Negative for rash. Neurological: Positive for dizziness and headaches.        Current Outpatient Medications   Medication Sig Dispense Refill    oseltamivir (TAMIFLU) 75 MG capsule Take 1 capsule by mouth 2 times daily for 5 days 10 capsule 0    benzonatate (TESSALON PERLES)

## 2020-02-28 ENCOUNTER — OFFICE VISIT (OUTPATIENT)
Dept: INTERNAL MEDICINE CLINIC | Age: 55
End: 2020-02-28
Payer: COMMERCIAL

## 2020-02-28 VITALS
OXYGEN SATURATION: 97 % | RESPIRATION RATE: 16 BRPM | DIASTOLIC BLOOD PRESSURE: 78 MMHG | HEART RATE: 75 BPM | SYSTOLIC BLOOD PRESSURE: 120 MMHG | BODY MASS INDEX: 27.57 KG/M2 | WEIGHT: 209 LBS

## 2020-02-28 PROCEDURE — 99214 OFFICE O/P EST MOD 30 MIN: CPT | Performed by: INTERNAL MEDICINE

## 2020-02-28 PROCEDURE — 3046F HEMOGLOBIN A1C LEVEL >9.0%: CPT | Performed by: INTERNAL MEDICINE

## 2020-02-28 PROCEDURE — 1036F TOBACCO NON-USER: CPT | Performed by: INTERNAL MEDICINE

## 2020-02-28 PROCEDURE — G8427 DOCREV CUR MEDS BY ELIG CLIN: HCPCS | Performed by: INTERNAL MEDICINE

## 2020-02-28 PROCEDURE — 2022F DILAT RTA XM EVC RTNOPTHY: CPT | Performed by: INTERNAL MEDICINE

## 2020-02-28 PROCEDURE — G8484 FLU IMMUNIZE NO ADMIN: HCPCS | Performed by: INTERNAL MEDICINE

## 2020-02-28 PROCEDURE — 3017F COLORECTAL CA SCREEN DOC REV: CPT | Performed by: INTERNAL MEDICINE

## 2020-02-28 PROCEDURE — G8419 CALC BMI OUT NRM PARAM NOF/U: HCPCS | Performed by: INTERNAL MEDICINE

## 2020-02-28 RX ORDER — ALBUTEROL SULFATE 90 UG/1
2 AEROSOL, METERED RESPIRATORY (INHALATION) EVERY 6 HOURS PRN
Qty: 1 INHALER | Refills: 3 | Status: SHIPPED | OUTPATIENT
Start: 2020-02-28 | End: 2021-07-27

## 2020-02-28 RX ORDER — LISINOPRIL 2.5 MG/1
2.5 TABLET ORAL DAILY
Qty: 90 TABLET | Refills: 1 | Status: SHIPPED | OUTPATIENT
Start: 2020-02-28 | End: 2021-07-27

## 2020-02-28 RX ORDER — PIOGLITAZONEHYDROCHLORIDE 30 MG/1
30 TABLET ORAL DAILY
Qty: 90 TABLET | Refills: 1 | Status: SHIPPED | OUTPATIENT
Start: 2020-02-28 | End: 2021-07-27

## 2020-02-28 RX ORDER — ATENOLOL 25 MG/1
25 TABLET ORAL DAILY
Qty: 90 TABLET | Refills: 1 | Status: SHIPPED | OUTPATIENT
Start: 2020-02-28 | End: 2021-07-27

## 2020-02-28 RX ORDER — GLIPIZIDE 5 MG/1
5 TABLET ORAL DAILY
Qty: 90 TABLET | Refills: 1 | Status: SHIPPED | OUTPATIENT
Start: 2020-02-28 | End: 2020-08-17 | Stop reason: SDUPTHER

## 2020-02-28 ASSESSMENT — PATIENT HEALTH QUESTIONNAIRE - PHQ9
SUM OF ALL RESPONSES TO PHQ QUESTIONS 1-9: 0
SUM OF ALL RESPONSES TO PHQ QUESTIONS 1-9: 0
1. LITTLE INTEREST OR PLEASURE IN DOING THINGS: 0
DEPRESSION UNABLE TO ASSESS: FUNCTIONAL CAPACITY MOTIVATION LIMITS ACCURACY
SUM OF ALL RESPONSES TO PHQ9 QUESTIONS 1 & 2: 0
2. FEELING DOWN, DEPRESSED OR HOPELESS: 0

## 2020-02-28 NOTE — PATIENT INSTRUCTIONS
Patient Education        Learning About Diabetes Food Guidelines  Your Care Instructions    Meal planning is important to manage diabetes. It helps keep your blood sugar at a target level (which you set with your doctor). You don't have to eat special foods. You can eat what your family eats, including sweets once in a while. But you do have to pay attention to how often you eat and how much you eat of certain foods. You may want to work with a dietitian or a certified diabetes educator (CDE) to help you plan meals and snacks. A dietitian or CDE can also help you lose weight if that is one of your goals. What should you know about eating carbs? Managing the amount of carbohydrate (carbs) you eat is an important part of healthy meals when you have diabetes. Carbohydrate is found in many foods. · Learn which foods have carbs. And learn the amounts of carbs in different foods. ? Bread, cereal, pasta, and rice have about 15 grams of carbs in a serving. A serving is 1 slice of bread (1 ounce), ½ cup of cooked cereal, or 1/3 cup of cooked pasta or rice. ? Fruits have 15 grams of carbs in a serving. A serving is 1 small fresh fruit, such as an apple or orange; ½ of a banana; ½ cup of cooked or canned fruit; ½ cup of fruit juice; 1 cup of melon or raspberries; or 2 tablespoons of dried fruit. ? Milk and no-sugar-added yogurt have 15 grams of carbs in a serving. A serving is 1 cup of milk or 2/3 cup of no-sugar-added yogurt. ? Starchy vegetables have 15 grams of carbs in a serving. A serving is ½ cup of mashed potatoes or sweet potato; 1 cup winter squash; ½ of a small baked potato; ½ cup of cooked beans; or ½ cup cooked corn or green peas. · Learn how much carbs to eat each day and at each meal. A dietitian or CDE can teach you how to keep track of the amount of carbs you eat. This is called carbohydrate counting. · If you are not sure how to count carbohydrate grams, use the Plate Method to plan meals.  It is a good, quick way to make sure that you have a balanced meal. It also helps you spread carbs throughout the day. ? Divide your plate by types of foods. Put non-starchy vegetables on half the plate, meat or other protein food on one-quarter of the plate, and a grain or starchy vegetable in the final quarter of the plate. To this you can add a small piece of fruit and 1 cup of milk or yogurt, depending on how many carbs you are supposed to eat at a meal.  · Try to eat about the same amount of carbs at each meal. Do not \"save up\" your daily allowance of carbs to eat at one meal.  · Proteins have very little or no carbs per serving. Examples of proteins are beef, chicken, turkey, fish, eggs, tofu, cheese, cottage cheese, and peanut butter. A serving size of meat is 3 ounces, which is about the size of a deck of cards. Examples of meat substitute serving sizes (equal to 1 ounce of meat) are 1/4 cup of cottage cheese, 1 egg, 1 tablespoon of peanut butter, and ½ cup of tofu. How can you eat out and still eat healthy? · Learn to estimate the serving sizes of foods that have carbohydrate. If you measure food at home, it will be easier to estimate the amount in a serving of restaurant food. · If the meal you order has too much carbohydrate (such as potatoes, corn, or baked beans), ask to have a low-carbohydrate food instead. Ask for a salad or green vegetables. · If you use insulin, check your blood sugar before and after eating out to help you plan how much to eat in the future. · If you eat more carbohydrate at a meal than you had planned, take a walk or do other exercise. This will help lower your blood sugar. What else should you know? · Limit saturated fat, such as the fat from meat and dairy products. This is a healthy choice because people who have diabetes are at higher risk of heart disease. So choose lean cuts of meat and nonfat or low-fat dairy products.  Use olive or canola oil instead of butter or shortening reach a healthy weight if that is one of your goals. What plan is right for you? Your dietitian or diabetes educator may suggest that you start with the plate format or carbohydrate counting. The plate format  The plate format is a simple way to help you manage how you eat. You plan meals by learning how much space each food should take on a plate. Using the plate format helps you spread carbohydrate throughout the day. It can make it easier to keep your blood sugar level within your target range. It also helps you see if you're eating healthy portion sizes. To use the plate format, you put non-starchy vegetables on half your plate. Add meat or meat substitutes on one-quarter of the plate. Put a grain or starchy vegetable (such as brown rice or a potato) on the final quarter of the plate. You can add a small piece of fruit and some low-fat or fat-free milk or yogurt, depending on your carbohydrate goal for each meal.  Here are some tips for using the plate format:  · Make sure that you are not using an oversized plate. A 9-inch plate is best. Many restaurants use larger plates. · Get used to using the plate format at home. Then you can use it when you eat out. · Write down your questions about using the plate format. Talk to your doctor, a dietitian, or a diabetes educator about your concerns. Carbohydrate counting  With carbohydrate counting, you plan meals based on the amount of carbohydrate in each food. Carbohydrate raises blood sugar higher and more quickly than any other nutrient. It is found in desserts, breads and cereals, and fruit. It's also found in starchy vegetables such as potatoes and corn, grains such as rice and pasta, and milk and yogurt. Spreading carbohydrate throughout the day helps keep your blood sugar levels within your target range.   Your daily amount depends on several things, including your weight, how active you are, which diabetes medicines you take, and what your goals are for [employer]. Care instructions adapted under license by [employer] ([facility]). If you have questions about a medical condition or this instruction, always ask your healthcare professional. [address] any warranty or liability for your use of this information. Patient Education         Diabetes: How to Build Your Plate (60:72)  Your health professional recommends that you watch this short online health video. See how to measure healthy portion sizes using the plate method. How to watch the video    Scan the QR code   OR Visit the website    [email or web address]   Current as of: April 16, 2019  Content Version: 12.3  © 7594-5313 [employer]. Care instructions adapted under license by [employer] ([facility]). If you have questions about a medical condition or this instruction, always ask your healthcare professional. [address] any warranty or liability for your use of this information. FOR DIABETIC MEDS, TAKE METFORMIN 500MG TWICE/DAY--- BEFORE BREAKFAST AND BEFORE SUPPER    RETRY GLIPIZIDE 1/2 TAB IN AM BEFORE BREAKFAST AND CALL IF SUGARS ARE BOTTOMING OUT.     --- IF A1C IS STILL HIGH IN 6 WEEKS W YOUR LAB WE WILL CONSIDER INCREASE TO FULL TAB DAY    NEW DIABETIC MED IS ALSO ACTOS 30MG DAILY IN AM      CHECK LAB IN 6 WEEKS, THEN WE WILL ADJUST MEDS DEP ON RESULTS

## 2020-02-28 NOTE — PROGRESS NOTES
normal. There is no distension. Palpations: Abdomen is soft. There is no mass. Tenderness: There is no abdominal tenderness. There is no guarding or rebound. Musculoskeletal:         General: No tenderness. Lymphadenopathy:      Cervical: No cervical adenopathy. Skin:     General: Skin is warm and dry. Neurological:      Mental Status: He is alert. Psychiatric:         Mood and Affect: Mood normal.         ASSESSMENT:    1. Type 2 diabetes mellitus with diabetic nephropathy, without long-term current use of insulin ([address]    2. Mild intermittent asthma without complication    3. Diabetes mellitus with nephropathy ([address]    4. Essential hypertension        PLAN:    Helen Funez was seen today for diabetes. Diagnoses and all orders for this visit:    Type 2 diabetes mellitus with diabetic nephropathy, without long-term current use of insulin ([address]- a1c high, he will retry first low dose daily 12 tab glipizide and cont metformin, add actos. Then f/u lab in 6 weeks to see if a1c improves. If still high sugars, then if tolerating will incr glipizide to full tab/day  -     metFORMIN (GLUCOPHAGE) 500 MG tablet; Take 1 tablet by mouth 2 times daily (with meals)  -     Comprehensive Metabolic Panel; Future  -     CBC Auto Differential; Future  -     Lipid Panel; Future  -     Hemoglobin A1C; Future  -     Microalbumin / Creatinine Urine Ratio; Future  -     glipiZIDE (GLUCOTROL) 5 MG tablet; Take 1 tablet by mouth daily    Mild intermittent asthma without complication - Pt clinically w/o evidence of cardiovascular instability, cont regimen. -     albuterol sulfate HFA (PROVENTIL HFA) 108 (90 Base) MCG/ACT inhaler; Inhale 2 puffs into the lungs every 6 hours as needed for Wheezing  -     albuterol sulfate HFA (PROVENTIL HFA) 108 (90 Base) MCG/ACT inhaler;  Inhale 2 puffs into the lungs every 6 hours as needed for Wheezing    Diabetes mellitus with nephropathy ([address]- cont meds and monitor fxn  - lisinopril (PRINIVIL;ZESTRIL) 2.5 MG tablet; Take 1 tablet by mouth daily  -     Comprehensive Metabolic Panel; Future  -     CBC Auto Differential; Future    Essential hypertension- Blood pressure stable and will continue current regimen. Will plan periodic monitoring of renal function, electrolytes, lipid profile. -     lisinopril (PRINIVIL;ZESTRIL) 2.5 MG tablet; Take 1 tablet by mouth daily  -     atenolol (TENORMIN) 25 MG tablet; Take 1 tablet by mouth daily  -     Comprehensive Metabolic Panel; Future  -     CBC Auto Differential; Future    Other orders  -     blood glucose test strips ([employer] TEST STRIPS) strip; 1 each by In Vitro route daily As needed. -     pioglitazone (ACTOS) 30 MG tablet;  Take 1 tablet by mouth daily

## 2021-07-27 ENCOUNTER — OFFICE VISIT (OUTPATIENT)
Dept: INTERNAL MEDICINE CLINIC | Age: 56
End: 2021-07-27
Payer: COMMERCIAL

## 2021-07-27 VITALS
SYSTOLIC BLOOD PRESSURE: 124 MMHG | HEART RATE: 74 BPM | WEIGHT: 208 LBS | BODY MASS INDEX: 27.57 KG/M2 | OXYGEN SATURATION: 97 % | DIASTOLIC BLOOD PRESSURE: 82 MMHG | HEIGHT: 73 IN | RESPIRATION RATE: 14 BRPM

## 2021-07-27 DIAGNOSIS — Z00.00 ROUTINE GENERAL MEDICAL EXAMINATION AT A HEALTH CARE FACILITY: Primary | ICD-10-CM

## 2021-07-27 DIAGNOSIS — Z12.12 SCREENING FOR COLORECTAL CANCER: ICD-10-CM

## 2021-07-27 DIAGNOSIS — G89.29 CHRONIC LEFT SHOULDER PAIN: ICD-10-CM

## 2021-07-27 DIAGNOSIS — Z12.11 SCREENING FOR COLORECTAL CANCER: ICD-10-CM

## 2021-07-27 DIAGNOSIS — M25.512 CHRONIC LEFT SHOULDER PAIN: ICD-10-CM

## 2021-07-27 DIAGNOSIS — Z12.5 SCREENING FOR MALIGNANT NEOPLASM OF PROSTATE: ICD-10-CM

## 2021-07-27 DIAGNOSIS — E11.21 TYPE 2 DIABETES MELLITUS WITH DIABETIC NEPHROPATHY, WITHOUT LONG-TERM CURRENT USE OF INSULIN (HCC): ICD-10-CM

## 2021-07-27 DIAGNOSIS — E78.2 HYPERLIPEMIA, MIXED: ICD-10-CM

## 2021-07-27 DIAGNOSIS — I10 ESSENTIAL HYPERTENSION: ICD-10-CM

## 2021-07-27 LAB
CHP ED QC CHECK: NORMAL
GLUCOSE BLD-MCNC: 211 MG/DL
HBA1C MFR BLD: 10 %

## 2021-07-27 PROCEDURE — 99396 PREV VISIT EST AGE 40-64: CPT | Performed by: INTERNAL MEDICINE

## 2021-07-27 PROCEDURE — 82962 GLUCOSE BLOOD TEST: CPT | Performed by: INTERNAL MEDICINE

## 2021-07-27 PROCEDURE — 83036 HEMOGLOBIN GLYCOSYLATED A1C: CPT | Performed by: INTERNAL MEDICINE

## 2021-07-27 RX ORDER — LISINOPRIL 2.5 MG/1
2.5 TABLET ORAL DAILY
Qty: 90 TABLET | Refills: 1 | Status: SHIPPED | OUTPATIENT
Start: 2021-07-27 | End: 2022-01-21 | Stop reason: SDUPTHER

## 2021-07-27 RX ORDER — EMPAGLIFLOZIN 10 MG/1
1 TABLET, FILM COATED ORAL DAILY
Qty: 90 TABLET | Refills: 1 | Status: SHIPPED | OUTPATIENT
Start: 2021-07-27 | End: 2022-01-21 | Stop reason: SDUPTHER

## 2021-07-27 RX ORDER — ROSUVASTATIN CALCIUM 5 MG/1
5 TABLET, COATED ORAL NIGHTLY
Qty: 90 TABLET | Refills: 1 | Status: SHIPPED | OUTPATIENT
Start: 2021-07-27 | End: 2022-01-21 | Stop reason: SDUPTHER

## 2021-07-27 RX ORDER — ASPIRIN 81 MG/1
81 TABLET, CHEWABLE ORAL DAILY
Qty: 30 TABLET | Refills: 3 | COMMUNITY
Start: 2021-07-27 | End: 2022-07-22

## 2021-07-27 ASSESSMENT — PATIENT HEALTH QUESTIONNAIRE - PHQ9
1. LITTLE INTEREST OR PLEASURE IN DOING THINGS: 0
2. FEELING DOWN, DEPRESSED OR HOPELESS: 0
SUM OF ALL RESPONSES TO PHQ QUESTIONS 1-9: 0
SUM OF ALL RESPONSES TO PHQ9 QUESTIONS 1 & 2: 0
SUM OF ALL RESPONSES TO PHQ QUESTIONS 1-9: 0
SUM OF ALL RESPONSES TO PHQ QUESTIONS 1-9: 0

## 2021-07-27 NOTE — PATIENT INSTRUCTIONS
Patient Education        Plantar Fasciitis: Care Instructions  Overview     Plantar fasciitis is pain and inflammation of the plantar fascia, the tissue at the bottom of your foot that connects the heel bone to the toes. The plantar fascia also supports the arch. If you strain the plantar fascia, it can develop small tears and cause heel pain when you stand or walk. Plantar fasciitis can be caused by running or other sports. It also may occur in people who are overweight or who have high arches or flat feet. You may get plantar fasciitis if you walk or stand for long periods, or have a tight Achilles tendon or calf muscles. You can improve your foot pain with rest and other care at home. It might take a few weeks to a few months for your foot to heal completely. Follow-up care is a key part of your treatment and safety. Be sure to make and go to all appointments, and call your doctor if you are having problems. It's also a good idea to know your test results and keep a list of the medicines you take. How can you care for yourself at home? · Rest your feet often. Reduce your activity to a level that lets you avoid pain. If possible, do not run or walk on hard surfaces. · Take pain medicines exactly as directed. ? If the doctor gave you a prescription medicine for pain, take it as prescribed. ? If you are not taking a prescription pain medicine, take an over-the-counter anti-inflammatory medicine for pain and swelling, such as ibuprofen (Advil, Motrin) or naproxen (Aleve). Read and follow all instructions on the label. · Use ice massage to help with pain and swelling. You can use an ice cube or an ice cup several times a day. To make an ice cup, fill a paper cup with water and freeze it. Cut off the top of the cup until a half-inch of ice shows. Hold onto the remaining paper to use the cup. Rub the ice in small circles over the area for 5 to 7 minutes.   · Contrast baths, which alternate hot and cold water, can also help reduce swelling. But because heat alone may make pain and swelling worse, end a contrast bath with a soak in cold water. · Wear a night splint if your doctor suggests it. A night splint holds your foot with the toes pointed up and the foot and ankle at a 90-degree angle. This position gives the bottom of your foot a constant, gentle stretch. · Do simple exercises such as calf stretches and towel stretches 2 to 3 times each day, especially when you first get up in the morning. These can help the plantar fascia become more flexible. They also make the muscles that support your arch stronger. Hold these stretches for 15 to 30 seconds per stretch. Repeat 2 to 4 times. ? Stand about 1 foot from a wall. Place the palms of both hands against the wall at chest level. Lean forward against the wall, keeping one leg with the knee straight and heel on the ground while bending the knee of the other leg.  ? Sit down on the floor or a mat with your feet stretched in front of you. Roll up a towel lengthwise, and loop it over the ball of your foot. Holding the towel at both ends, gently pull the towel toward you to stretch your foot. · Wear shoes with good arch support. Athletic shoes or shoes with a well-cushioned sole are good choices. · Replace athletic shoes regularly. · Try heel cups or shoe inserts (orthotics) to help cushion your heel. You can buy these at many shoe stores. · Put on your shoes as soon as you get out of bed. Going barefoot or wearing slippers may make your pain worse. · Reach and stay at a good weight for your height. This puts less strain on your feet. When should you call for help? Call your doctor now or seek immediate medical care if:    · You have heel pain with fever, redness, or warmth in your heel.     · You cannot put weight on the sore foot.    Watch closely for changes in your health, and be sure to contact your doctor if:    · You have numbness or tingling in your heel.     · Your heel pain lasts more than 2 weeks. Where can you learn more? Go to https://chpepiceweb.[employer]. org and sign in to your [employer] account. Enter A919 in the [facility] box to learn more about \"Plantar Fasciitis: Care Instructions. \"     If you do not have an account, please click on the \"Sign Up Now\" link. Current as of: November 16, 2020               Content Version: 12.9  © 2006-2021 Healthwise, [place]. Care instructions adapted under license by [facility] ([facility]). If you have questions about a medical condition or this instruction, always ask your healthcare professional. [place] [address] any warranty or liability for your use of this information. Patient Education        Plantar Fasciitis: Exercises  Introduction  Here are some examples of exercises for you to try. The exercises may be suggested for a condition or for rehabilitation. Start each exercise slowly. Ease off the exercises if you start to have pain. You will be told when to start these exercises and which ones will work best for you. How to do the exercises  Towel stretch   1. Sit with your legs extended and knees straight. 2. Place a towel around your foot just under the toes. 3. Hold each end of the towel in each hand, with your hands above your knees. 4. Pull back with the towel so that your foot stretches toward you. 5. Hold the position for at least 15 to 30 seconds. 6. Repeat 2 to 4 times a session, up to 5 sessions a day. Calf stretch   This exercise stretches the muscles at the back of the lower leg (the calf) and the Achilles tendon. Do this exercise 3 or 4 times a day, 5 days a week. 1. Stand facing a wall with your hands on the wall at about eye level. Put the leg you want to stretch about a step behind your other leg. 2. Keeping your back heel on the floor, bend your front knee until you feel a stretch in the back leg. 3. Hold the stretch for 15 to 30 seconds.  Repeat 2 to 4 times. Plantar fascia and calf stretch   Stretching the plantar fascia and calf muscles can increase flexibility and decrease heel pain. You can do this exercise several times each day and before and after activity. 1. Stand on a step as shown above. Be sure to hold on to the banister. 2. Slowly let your heels down over the edge of the step as you relax your calf muscles. You should feel a gentle stretch across the bottom of your foot and up the back of your leg to your knee. 3. Hold the stretch about 15 to 30 seconds, and then tighten your calf muscle a little to bring your heel back up to the level of the step. Repeat 2 to 4 times. Towel curls   Make this exercise more challenging by placing a weighted object, such as a soup can, on the other end of the towel. 1. While sitting, place your foot on a towel on the floor and scrunch the towel toward you with your toes. 2. Then, also using your toes, push the towel away from you. [place] pickups   1. Put marbles on the floor next to a cup.  2. Using your toes, try to lift the marbles up from the floor and put them in the cup. Follow-up care is a key part of your treatment and safety. Be sure to make and go to all appointments, and call your doctor if you are having problems. It's also a good idea to know your test results and keep a list of the medicines you take. Where can you learn more? Go to https://Wysiwyg.[employer]. org and sign in to your [employer] account. Enter X558 in the [facility] box to learn more about \"Plantar Fasciitis: Exercises. \"     If you do not have an account, please click on the \"Sign Up Now\" link. Current as of: November 16, 2020               Content Version: 12.9  © 5794-6756 Healthwise, Incorporated. Care instructions adapted under license by [facility] ([facility]).  If you have questions about a medical condition or this instruction, always ask your healthcare professional. Vito Moreno disclaims any warranty or liability for your use of this information. Patient Education        Rotator Cuff: Exercises  Introduction  Here are some examples of exercises for you to try. The exercises may be suggested for a condition or for rehabilitation. Start each exercise slowly. Ease off the exercises if you start to have pain. You will be told when to start these exercises and which ones will work best for you. How to do the exercises  Pendulum swing   If you have pain in your back, do not do this exercise. 1. Hold on to a table or the back of a chair with your good arm. Then bend forward a little and let your sore arm hang straight down. This exercise does not use the arm muscles. Rather, use your legs and your hips to create movement that makes your arm swing freely. 2. Use the movement from your hips and legs to guide the slightly swinging arm back and forth like a pendulum (or elephant trunk). Then guide it in circles that start small (about the size of a dinner plate). Make the circles a bit larger each day, as your pain allows. 3. Do this exercise for 5 minutes, 5 to 7 times each day. 4. As you have less pain, try bending over a little farther to do this exercise. This will increase the amount of movement at your shoulder. Posterior stretching exercise   1. Hold the elbow of your injured arm with your other hand. 2. Use your hand to pull your injured arm gently up and across your body. You will feel a gentle stretch across the back of your injured shoulder. 3. Hold for at least 15 to 30 seconds. Then slowly lower your arm. 4. Repeat 2 to 4 times. Up-the-back stretch   Your doctor or physical therapist may want you to wait to do this stretch until you have regained most of your range of motion and strength. You can do this stretch in different ways. Hold any of these stretches for at least 15 to 30 seconds. Repeat them 2 to 4 times.   1. Light stretch: Put your hand in your back pocket. Let it rest there to stretch your shoulder. 2. Moderate stretch: With your other hand, hold your injured arm (palm outward) behind your back by the wrist. Pull your arm up gently to stretch your shoulder. 3. Advanced stretch: Put a towel over your other shoulder. Put the hand of your injured arm behind your back. Now hold the back end of the towel. With the other hand, hold the front end of the towel in front of your body. Pull gently on the front end of the towel. This will bring your hand farther up your back to stretch your shoulder. Overhead stretch   1. Standing about an arm's length away, grasp onto a solid surface. You could use a countertop, a doorknob, or the back of a sturdy chair. 2. With your knees slightly bent, bend forward with your arms straight. Lower your upper body, and let your shoulders stretch. 3. As your shoulders are able to stretch farther, you may need to take a step or two backward. 4. Hold for at least 15 to 30 seconds. Then stand up and relax. If you had stepped back during your stretch, step forward so you can keep your hands on the solid surface. 5. Repeat 2 to 4 times. Shoulder flexion (lying down)   To make a wand for this exercise, use a piece of PVC pipe or a broom handle with the broom removed. Make the wand about a foot wider than your shoulders. 1. Lie on your back, holding a wand with both hands. Your palms should face down as you hold the wand. 2. Keeping your elbows straight, slowly raise your arms over your head. Raise them until you feel a stretch in your shoulders, upper back, and chest.  3. Hold for 15 to 30 seconds. 4. Repeat 2 to 4 times. Shoulder rotation (lying down)   To make a wand for this exercise, use a piece of PVC pipe or a broom handle with the broom removed. Make the wand about a foot wider than your shoulders. 1. Lie on your back. Hold a wand with both hands with your elbows bent and palms up.   2. Keep your elbows close to your body, and move the wand across your body toward the sore arm. 3. Hold for 8 to 12 seconds. 4. Repeat 2 to 4 times. Wall climbing (to the side)   Avoid any movement that is straight to your side, and be careful not to arch your back. Your arm should stay about 30 degrees to the front of your side. 1. Stand with your side to a wall so that your fingers can just touch it at an angle about 30 degrees toward the front of your body. 2. Walk the fingers of your injured arm up the wall as high as pain permits. Try not to shrug your shoulder up toward your ear as you move your arm up. 3. Hold that position for a count of at least 15 to 20.  4. Walk your fingers back down to the starting position. 5. Repeat at least 2 to 4 times. Try to reach higher each time. Wall climbing (to the front)   During this stretching exercise, be careful not to arch your back. 1. Face a wall, and stand so your fingers can just touch it. 2. Keeping your shoulder down, walk the fingers of your injured arm up the wall as high as pain permits. (Don't shrug your shoulder up toward your ear.)  3. Hold your arm in that position for at least 15 to 30 seconds. 4. Slowly walk your fingers back down to where you started. 5. Repeat at least 2 to 4 times. Try to reach higher each time. Shoulder blade squeeze   1. Stand with your arms at your sides, and squeeze your shoulder blades together. Do not raise your shoulders up as you squeeze. 2. Hold 6 seconds. 3. Repeat 8 to 12 times. Scapular exercise: Arm reach   1. Lie flat on your back. This exercise is a very slight motion that starts with your arms raised (elbows straight, arms straight). 2. From this position, reach higher toward the flynn or ceiling. Keep your elbows straight. All motion should be from your shoulder blade only. 3. Relax your arms back to where you started. 4. Repeat 8 to 12 times.     Arm raise to the side   During this strengthening exercise, your arm should stay about 30 degrees to the front of your side. 1. Slowly raise your injured arm to the side, with your thumb facing up. Raise your arm 60 degrees at the most (shoulder level is 90 degrees). 2. Hold the position for 3 to 5 seconds. Then lower your arm back to your side. If you need to, bring your \"good\" arm across your body and place it under the elbow as you lower your injured arm. Use your good arm to keep your injured arm from dropping down too fast.  3. Repeat 8 to 12 times. 4. When you first start out, don't hold any extra weight in your hand. As you get stronger, you may use a 1-pound to 2-pound dumbbell or a small can of food. Shoulder flexor and extensor exercise   These are isometric exercises. That means you contract your muscles without actually moving. 1. Push forward (flex): Stand facing a wall or doorjamb, about 6 inches or less back. Hold your injured arm against your body. Make a closed fist with your thumb on top. Then gently push your hand forward into the wall with about 25% to 50% of your strength. Don't let your body move backward as you push. Hold for about 6 seconds. Relax for a few seconds. Repeat 8 to 12 times. 2. Push backward (extend): Stand with your back flat against a wall. Your upper arm should be against the wall, with your elbow bent 90 degrees (your hand straight ahead). Push your elbow gently back against the wall with about 25% to 50% of your strength. Don't let your body move forward as you push. Hold for about 6 seconds. Relax for a few seconds. Repeat 8 to 12 times. Scapular exercise: Wall push-ups   This exercise is best done with your fingers somewhat turned out, rather than straight up and down. 1. Stand facing a wall, about 12 inches to 18 inches away. 2. Place your hands on the wall at shoulder height. 3. Slowly bend your elbows and bring your face to the wall. Keep your back and hips straight. 4. Push back to where you started.   5. Repeat 8 to 12 times.  6. When you can do this exercise against a wall comfortably, you can try it against a counter. You can then slowly progress to the end of a couch, then to a sturdy chair, and finally to the floor. Scapular exercise: Retraction   For this exercise, you will need elastic exercise material, such as surgical tubing or Thera-Band. 1. Put the band around a solid object at about waist level. (A bedpost will work well.) Each hand should hold an end of the band. 2. With your elbows at your sides and bent to 90 degrees, pull the band back. Your shoulder blades should move toward each other. Then move your arms back where you started. 3. Repeat 8 to 12 times. 4. If you have good range of motion in your shoulders, try this exercise with your arms lifted out to the sides. Keep your elbows at a 90-degree angle. Raise the elastic band up to about shoulder level. Pull the band back to move your shoulder blades toward each other. Then move your arms back where you started. Internal rotator strengthening exercise   1. Start by tying a piece of elastic exercise material to a doorknob. You can use surgical tubing or Thera-Band. 2. Stand or sit with your shoulder relaxed and your elbow bent 90 degrees. Your upper arm should rest comfortably against your side. Squeeze a rolled towel between your elbow and your body for comfort. This will help keep your arm at your side. 3. Hold one end of the elastic band in the hand of the painful arm. 4. Slowly rotate your forearm toward your body until it touches your belly. Slowly move it back to where you started. 5. Keep your elbow and upper arm firmly tucked against the towel roll or at your side. 6. Repeat 8 to 12 times. External rotator strengthening exercise   1. Start by tying a piece of elastic exercise material to a doorknob. You can use surgical tubing or Thera-Band. (You may also hold one end of the band in each hand.)  2.  Stand or sit with your shoulder relaxed and your elbow bent 90 degrees. Your upper arm should rest comfortably against your side. Squeeze a rolled towel between your elbow and your body for comfort. This will help keep your arm at your side. 3. Hold one end of the elastic band with the hand of the painful arm. 4. Start with your forearm across your belly. Slowly rotate the forearm out away from your body. Keep your elbow and upper arm tucked against the towel roll or the side of your body until you begin to feel tightness in your shoulder. Slowly move your arm back to where you started. 5. Repeat 8 to 12 times. Follow-up care is a key part of your treatment and safety. Be sure to make and go to all appointments, and call your doctor if you are having problems. It's also a good idea to know your test results and keep a list of the medicines you take. Where can you learn more? Go to https://[employer]peSynthox.PeerMe. org and sign in to your [employer] account. Enter Anita Singh in the [employer] box to learn more about \"Rotator Cuff: Exercises. \"     If you do not have an account, please click on the \"Sign Up Now\" link. Current as of: November 16, 2020               Content Version: 12.9  © 0455-6636 Healthwise, Incorporated. Care instructions adapted under license by [facility] ([facility]). If you have questions about a medical condition or this instruction, always ask your healthcare professional. [place] [address] any warranty or liability for your use of this information.

## 2021-07-27 NOTE — PROGRESS NOTES
History and Physical      Alexi Doe  YOB: 1965    Date of Service:  7/27/2021    Chief Complaint:   Alexi Doe is a 50 y.o. male who presents for complete physical examination. HPI:   Has stopped all meds the past yr, glc running ~200s. Today is 211. We'll f/u lab today. Had severe diarrhea on metformin. a1c is 10. HTN- been off atenolol and bp is ok but we discussed restarting lisinopril. Heel pain noted, worse in am and better later in day. Defers colonoscopy but will consider cologuard    Asthma:  Patient denies significant chest pain/tightness, dyspnea, decreased exercise tolerance, cough, or wheezing on current regimen.     L shoulder stiff w ROM       Wt Readings from Last 3 Encounters:   07/27/21 208 lb (94.3 kg)   02/28/20 209 lb (94.8 kg)   02/12/20 212 lb (96.2 kg)     BP Readings from Last 3 Encounters:   07/27/21 124/82   02/28/20 120/78   02/12/20 116/74       Patient Active Problem List   Diagnosis    Hypertension    Fatty liver disease, nonalcoholic    Asthma    Type 2 diabetes mellitus with diabetic nephropathy, without long-term current use of insulin (HCC)       Preventive Care:  Health Maintenance   Topic Date Due    Hepatitis C screen  Never done    COVID-19 Vaccine (1) Never done    HIV screen  Never done    Hepatitis B vaccine (1 of 3 - Risk 3-dose series) Never done    DTaP/Tdap/Td vaccine (1 - Tdap) Never done    Colon cancer screen colonoscopy  Never done    Diabetic retinal exam  06/15/2017    Shingles Vaccine (1 of 2) Never done    Diabetic foot exam  08/28/2020    A1C test (Diabetic or Prediabetic)  08/28/2020    Lipid screen  08/28/2020    Potassium monitoring  08/28/2020    Creatinine monitoring  08/28/2020    Flu vaccine (1) 09/01/2021    Pneumococcal 0-64 years Vaccine (2 of 2 - PPSV23) 11/09/2032    Hepatitis A vaccine  Aged Out    Hib vaccine  Aged Out    Meningococcal (ACWY) vaccine  Aged Out         Lipid panel: intact distal pulses. Exam reveals no gallop and no friction rub. No murmur heard. Pulmonary/Chest: Effort normal and breath sounds normal. No respiratory distress. He has no wheezes, rhonchi or rales. Abdominal: Soft, non-tender. Bowel sounds and aorta are normal. There is no organomegaly, mass or bruit. Musculoskeletal: Normal range of motion, no synovitis. He exhibits no edema. Neurological: He is alert and oriented to person, place, and time. No cranial nerve deficit. Coordination normal.   Skin: Skin is warm, dry and intact. Psychiatric: He has a normal mood and affect. His speech is normal and behavior is normal. Judgment, cognition and memory are normal.   FOOT EXAM  Visual inspection:  Deformity/amputation: absent  Skin lesions/pre-ulcerative calluses: absent  Edema: right- negative, left- negative    Sensory exam:  Monofilament sensation: normal  (minimum of 5 random plantar locations tested, avoiding callused areas - > 1 area with absence of sensation is + for neuropathy)      Pulses: normal              Assessment/Plan:    Merlin Licea was seen today for annual exam and diabetes. Diagnoses and all orders for this visit:    Routine general medical examination at a health care facility- SEE ISSUES BELOW  -     MAGNESIUM; Future    Type 2 diabetes mellitus with diabetic nephropathy, without long-term current use of insulin ([facility] [address]- HE ALSO STATED [facility] CAN DROP ON GLIPIZIDE. ADVISED OF RISK OF PROGRESSION OF KIDNEY DAMAGE AND DM NEPHROPATHY IF SUGARS REMAIN UNCONTROLLED SO STOPPING MEDS IS NOT ADVISABLE. HE'LL TRY JARDIANCE IF INS WILL COVER, ALSO RESTART ASA, TRIAL STATIN, RESTART ACEI. F/U LAB IN 2-3 MONTHS TO SEE IF A1C IMPROVES AND SUGARS STABILIZE. -     POCT Glucose  -     Comprehensive Metabolic Panel; Future  -     CBC Auto Differential; Future  -     Lipid Panel; Future  -     Hemoglobin A1C; Future  -     Microalbumin / Creatinine Urine Ratio;  Future  -      DIABETES FOOT EXAM  -     POCT glycosylated hemoglobin (Hb A1C)  -     empagliflozin (JARDIANCE) 10 MG tablet; Take 1 tablet by mouth daily  -     lisinopril (PRINIVIL;ZESTRIL) 2.5 MG tablet; Take 1 tablet by mouth daily  -     rosuvastatin (CRESTOR) 5 MG tablet; Take 1 tablet by mouth nightly  -     MAGNESIUM; Future    Essential hypertension - BP STABLE OFF ATENOLOL BUT WE'LL RESTART LISINOPRIL  -     Comprehensive Metabolic Panel; Future  -     CBC Auto Differential; Future  -     Lipid Panel; Future  -     lisinopril (PRINIVIL;ZESTRIL) 2.5 MG tablet; Take 1 tablet by mouth daily  -     Cancel: Magnesium  -     MAGNESIUM; Future    Chronic left shoulder pain- SUSPECT RC TENDINITIS VS TEAR, REFER ORTHO EVAL  -     Pamela - Brianda Cueto, Nathaniel Patel DO, Orthopedic Surgery, [place]    Hyperlipemia, mixed- F/U LAB AFTER ON LOW DOSE CRESTOR  -     Lipid Panel; Future  -     TSH without Reflex; Future    Screening for colorectal cancer- COLOGUARD ORDERED  -     Cologuard (For External Results Only); Future    Screening for malignant neoplasm of prostate  -     PSA, Prostatic Specific Antigen; Future    Other orders  -     aspirin (ASPIRIN CHILDRENS) 81 MG chewable tablet;  Take 1 tablet by mouth daily

## 2021-08-11 RX ORDER — SILDENAFIL CITRATE 20 MG/1
20 TABLET ORAL DAILY PRN
Qty: 30 TABLET | Refills: 2 | Status: SHIPPED | OUTPATIENT
Start: 2021-08-11 | End: 2022-01-21

## 2021-08-16 ENCOUNTER — OFFICE VISIT (OUTPATIENT)
Dept: ORTHOPEDIC SURGERY | Age: 56
End: 2021-08-16
Payer: COMMERCIAL

## 2021-08-16 VITALS — HEIGHT: 73 IN | RESPIRATION RATE: 16 BRPM | WEIGHT: 208 LBS | BODY MASS INDEX: 27.57 KG/M2

## 2021-08-16 DIAGNOSIS — S46.012A ROTATOR CUFF STRAIN, LEFT, INITIAL ENCOUNTER: ICD-10-CM

## 2021-08-16 DIAGNOSIS — M25.512 CHRONIC LEFT SHOULDER PAIN: ICD-10-CM

## 2021-08-16 DIAGNOSIS — E11.618 ADHESIVE CAPSULITIS OF LEFT SHOULDER ASSOCIATED WITH TYPE 2 DIABETES MELLITUS (HCC): Primary | ICD-10-CM

## 2021-08-16 DIAGNOSIS — G89.29 CHRONIC LEFT SHOULDER PAIN: ICD-10-CM

## 2021-08-16 DIAGNOSIS — M75.02 ADHESIVE CAPSULITIS OF LEFT SHOULDER ASSOCIATED WITH TYPE 2 DIABETES MELLITUS (HCC): Primary | ICD-10-CM

## 2021-08-16 PROCEDURE — 2022F DILAT RTA XM EVC RTNOPTHY: CPT | Performed by: PHYSICIAN ASSISTANT

## 2021-08-16 PROCEDURE — 99202 OFFICE O/P NEW SF 15 MIN: CPT | Performed by: PHYSICIAN ASSISTANT

## 2021-08-16 PROCEDURE — G8419 CALC BMI OUT NRM PARAM NOF/U: HCPCS | Performed by: PHYSICIAN ASSISTANT

## 2021-08-16 PROCEDURE — G8427 DOCREV CUR MEDS BY ELIG CLIN: HCPCS | Performed by: PHYSICIAN ASSISTANT

## 2021-08-16 ASSESSMENT — ENCOUNTER SYMPTOMS
GASTROINTESTINAL NEGATIVE: 1
RESPIRATORY NEGATIVE: 1
EYES NEGATIVE: 1

## 2021-08-16 NOTE — PROGRESS NOTES
[address] and Sports Medicine      HPI:  Aaron Ricketts is a 50 y.o. male the presents the office with a history of left shoulder pain that has been progressively getting worse over the last 6 months. He does not recall any particular injury. He does use his arms a lot for his job and he has noticed he is having some difficulty doing certain things. He states that the range of motion in his left shoulder is getting worse. He has a history of a right rotator cuff injury and repair. No prior injuries to the left shoulder. He does have a history of diabetes. He rates his left shoulder pain at a 6/10 aching toothache-like sensation down the arm. I reviewed and agree with the portions of the HPI, review of systems, vital documentation and plan performed by my staff and have added/addended where appropriate. The patient was referred by Effie Hair MD for evaluation of left shoulder pain    Past Medical History:   Diagnosis Date    Abdominal pain, right lower quadrant     Asthma     Bee sting allergies     Decreased libido     Diabetes mellitus with nephropathy ([facility] [address]     Family history of coronary artery disease     Fatigue     Fatty liver disease, nonalcoholic     Hypertension     NEED EKG, CONSIDER CT CARDIAC SCORING       Past Surgical History:   Procedure Laterality Date    ROTATOR CUFF REPAIR Right 2005    right     SEPTOPLASTY  1's       Family History   Problem Relation Age of Onset    High Cholesterol Mother     High Blood Pressure Mother     Diabetes Mother     Heart Disease Mother         CAD?     Thyroid Disease Mother         Hypothyroid       Social History     Socioeconomic History    Marital status:      Spouse name: None    Number of children: None    Years of education: None    Highest education level: None   Occupational History    Occupation: Truck Movers     Comment:     Occupation: [employer]    Occupation: [employer] Comment: since 4/2019   Tobacco Use    Smoking status: Former Smoker     Packs/day: 0.50     Years: 10.00     Pack years: 5.00     Types: Cigarettes    Smokeless tobacco: Former User     Types: Chew    Tobacco comment: E Cig on regular basis   Substance and Sexual Activity    Alcohol use: Yes     Alcohol/week: 0.0 standard drinks     Comment: Occasional     Drug use: Never    Sexual activity: None   Other Topics Concern    None   Social History Narrative    Diet: unrestricted    Exercise: A lot    Seat Belt: Mostly     Social Determinants of Health     Financial Resource Strain:     Difficulty of Paying Living Expenses:    Food Insecurity:     Worried About Running Out of Food in the Last Year:     [address] in the Last Year:    Transportation Needs:     Lack of Transportation (Medical):      Lack of Transportation (Non-Medical):    Physical Activity:     Days of Exercise per Week:     Minutes of Exercise per Session:    Stress:     Feeling of Stress :    Social Connections:     Frequency of Communication with Friends and Family:     Frequency of Social Gatherings with Friends and Family:     Attends [employer] Services:     Active Member of Clubs or Organizations:     Attends Club or Organization Meetings:     Marital Status:    Intimate Partner Violence:     Fear of Current or Ex-Partner:     Emotionally Abused:     Physically Abused:     Sexually Abused:        Current Outpatient Medications   Medication Sig Dispense Refill    sildenafil (REVATIO) 20 MG tablet Take 1 tablet by mouth daily as needed (ED) 30 tablet 2    empagliflozin (JARDIANCE) 10 MG tablet Take 1 tablet by mouth daily 90 tablet 1    lisinopril (PRINIVIL;ZESTRIL) 2.5 MG tablet Take 1 tablet by mouth daily 90 tablet 1    aspirin (ASPIRIN CHILDRENS) 81 MG chewable tablet Take 1 tablet by mouth daily 30 tablet 3    rosuvastatin (CRESTOR) 5 MG tablet Take 1 tablet by mouth nightly 90 tablet 1     No current facility-administered medications for this visit. Allergies   Allergen Reactions    Metformin And Related Diarrhea    Nsaids      Has diabetic nephropathy       Vitals:    08/16/21 1114   Resp: 16   Weight: 208 lb (94.3 kg)   Height: 6' 1\" (1.854 m)         Review of Systems:   Review of Systems   Constitutional: Negative. HENT: Negative. Eyes: Negative. Respiratory: Negative. Cardiovascular: Negative. Gastrointestinal: Negative. Genitourinary: Negative. Musculoskeletal: Positive for arthralgias and myalgias. Skin: Negative. Negative for rash and wound. Neurological: Negative. Psychiatric/Behavioral: Negative. Physical Exam:   Gen/Psych:Examination reveals a pleasant individual in no acute distress. The patient is oriented to time, place and person. The patient's mood and affect are appropriate. Patient appears well nourished. Body habitus is normal    HEENT: Head is atraumatic normocephalic, ears are symmetric, eyes show equal pupils bilaterally, extraocular muscles intact. Hearing is intact to normal voice at 5 feet. Nares are patent bilaterally, no epistaxis, no rhinorrhea. Lymph:  No obvious lymphedema in bilateral upper extremities     Skin: Intact in bilateral upper extremities with no ulcerations, lesions, rash, erythema. Vascular: There are no varicosities in bilateral upper  extremities, sensation intact to light touch over bilateral upper extremities.     Musculoskeletal:  Left shoulder exam:  Skin:  Clear with no erythema, there is no significant joint effusion  Deformity:  none  Atrophy:  none  Tenderness:  globally  Active ROM:   FE:100    IR side: sacrum           ER side: 20   Ad/Abduction: 90      Passive ROM is not the same as active   F/E: 120 degrees      ER side: 40 degrees    Abduction: 100 degrees  Strength: FE:5/5     ER:5/5   Neer:  severely positive  Tineo:  severely positive        Imaging:   3 views of the left shoulder taken reviewed in the office today show no acute fractures, no dislocations, moderate degenerative changes in the acromioclavicular joint. The official read and interpretation of these x-rays will be done by the the [address] [place] [address] [facility]       Assessment:    Diagnosis Orders   1. Adhesive capsulitis of left shoulder associated with type 2 diabetes mellitus ([address]  MRI SHOULDER LEFT WO CONTRAST   2. Rotator cuff strain, left, initial encounter  MRI SHOULDER LEFT WO CONTRAST   3. Chronic left shoulder pain  MRI SHOULDER LEFT WO CONTRAST         Plan:   MRI of the left shoulder  I did explain to the patient that he has at least some component of adhesive capsulitis which appears to be affecting his motion but whether or not he has other pathology such as a rotator cuff tear within the shoulder is hard to ascertain at this point. I did offer her to do an injection and start physical therapy but the patient was reluctant and really wanted to see what was going on within the shoulder since he has had a rotator cuff tear in the right shoulder in the past.      *Please note this report has been partially produced using speech recognition Dragon software and may contain errors related to that system including errors in grammar, punctuation, and spelling, as well as words and phrases that may be inappropriate.  If there are any questions or concerns please feel free to contact the dictating provider for clarification

## 2021-09-15 ENCOUNTER — HOSPITAL ENCOUNTER (OUTPATIENT)
Dept: MRI IMAGING | Age: 56
Discharge: HOME | End: 2021-09-15
Payer: COMMERCIAL

## 2021-09-15 DIAGNOSIS — S46.012A ROTATOR CUFF STRAIN, LEFT, INITIAL ENCOUNTER: ICD-10-CM

## 2021-09-15 DIAGNOSIS — G89.29 CHRONIC LEFT SHOULDER PAIN: ICD-10-CM

## 2021-09-15 DIAGNOSIS — M75.02 ADHESIVE CAPSULITIS OF LEFT SHOULDER ASSOCIATED WITH TYPE 2 DIABETES MELLITUS (HCC): ICD-10-CM

## 2021-09-15 DIAGNOSIS — E11.618 ADHESIVE CAPSULITIS OF LEFT SHOULDER ASSOCIATED WITH TYPE 2 DIABETES MELLITUS (HCC): ICD-10-CM

## 2021-09-15 DIAGNOSIS — M25.512 CHRONIC LEFT SHOULDER PAIN: ICD-10-CM

## 2021-09-15 PROCEDURE — 73221 MRI JOINT UPR EXTREM W/O DYE: CPT

## 2021-09-22 ENCOUNTER — TELEPHONE (OUTPATIENT)
Dept: ORTHOPEDIC SURGERY | Age: 56
End: 2021-09-22

## 2021-09-22 ENCOUNTER — OFFICE VISIT (OUTPATIENT)
Dept: ORTHOPEDIC SURGERY | Age: 56
End: 2021-09-22
Payer: COMMERCIAL

## 2021-09-22 ENCOUNTER — TELEPHONE (OUTPATIENT)
Dept: INTERNAL MEDICINE CLINIC | Age: 56
End: 2021-09-22

## 2021-09-22 VITALS
BODY MASS INDEX: 27.3 KG/M2 | WEIGHT: 206 LBS | RESPIRATION RATE: 16 BRPM | HEART RATE: 78 BPM | HEIGHT: 73 IN | OXYGEN SATURATION: 98 %

## 2021-09-22 DIAGNOSIS — S43.432A SUPERIOR GLENOID LABRUM LESION OF LEFT SHOULDER, INITIAL ENCOUNTER: Primary | ICD-10-CM

## 2021-09-22 PROCEDURE — 20610 DRAIN/INJ JOINT/BURSA W/O US: CPT | Performed by: STUDENT IN AN ORGANIZED HEALTH CARE EDUCATION/TRAINING PROGRAM

## 2021-09-22 PROCEDURE — 3017F COLORECTAL CA SCREEN DOC REV: CPT | Performed by: STUDENT IN AN ORGANIZED HEALTH CARE EDUCATION/TRAINING PROGRAM

## 2021-09-22 PROCEDURE — G8427 DOCREV CUR MEDS BY ELIG CLIN: HCPCS | Performed by: STUDENT IN AN ORGANIZED HEALTH CARE EDUCATION/TRAINING PROGRAM

## 2021-09-22 PROCEDURE — 99213 OFFICE O/P EST LOW 20 MIN: CPT | Performed by: STUDENT IN AN ORGANIZED HEALTH CARE EDUCATION/TRAINING PROGRAM

## 2021-09-22 PROCEDURE — 1036F TOBACCO NON-USER: CPT | Performed by: STUDENT IN AN ORGANIZED HEALTH CARE EDUCATION/TRAINING PROGRAM

## 2021-09-22 PROCEDURE — G8419 CALC BMI OUT NRM PARAM NOF/U: HCPCS | Performed by: STUDENT IN AN ORGANIZED HEALTH CARE EDUCATION/TRAINING PROGRAM

## 2021-09-22 RX ORDER — MELOXICAM 15 MG/1
7.5 TABLET ORAL DAILY PRN
Qty: 30 TABLET | Refills: 0 | Status: SHIPPED
Start: 2021-09-22 | End: 2021-09-22 | Stop reason: SINTOL

## 2021-09-22 RX ORDER — ACETAMINOPHEN 500 MG
1000 TABLET ORAL 3 TIMES DAILY PRN
Qty: 180 TABLET | Refills: 0 | Status: SHIPPED | OUTPATIENT
Start: 2021-09-22 | End: 2022-01-21

## 2021-09-22 ASSESSMENT — ENCOUNTER SYMPTOMS
COUGH: 0
COLOR CHANGE: 0
SORE THROAT: 0
NAUSEA: 0
SHORTNESS OF BREATH: 0
DIARRHEA: 0
VOMITING: 0
WHEEZING: 0
BACK PAIN: 0

## 2021-09-22 NOTE — PATIENT INSTRUCTIONS
Continue range of motion  Ice and elevate as needed  Tylenol or Motrin for pain  Injection given today in the office   Rest for 24-48 hours  Follow up 8 weeks  Physical therapy ordered today.  [phone]

## 2021-09-22 NOTE — TELEPHONE ENCOUNTER
Dr. Ya Girard office calls- they wanted to know if it would be okay if Dr. James Nix prescribes Mobic for [facility]. Please advise.

## 2021-09-22 NOTE — TELEPHONE ENCOUNTER
Pls let ortho know Juan Diaz is NOT a good candidate for any mobic or nsaids as has diabetic nephropathy

## 2021-09-22 NOTE — TELEPHONE ENCOUNTER
Neida London - Dr. Alex Hdz MA called in stating that the patient can not take Mobic due to the fact his diabetic neuropathy. Patient was informed and Tylenol was prescribed into pharmacy.

## 2021-09-22 NOTE — PROGRESS NOTES
9/22/2021   Chief Complaint   Patient presents with    Shoulder Pain     left        History of Present Illness:                             Alexi Doe is a 50 y.o. male right handed patient referred by DARYL Morfin for evaluation and treatment left shoulder pain. He is here today to review MRI of the left shoulder and discuss treatment options. He has had no treatment to the left shoulder to this point including a type of injection or therapy. Patient works as a technician and labor. Patient states that he has had progressive left shoulder pain for approximately 6 months. He has no specific injury but states that he noticed he was having difficulty using his arm at work due to significant pain especially over the biceps tendon. He has noticed that he has had worsening range of motion due to the pain. He has had no prior left shoulder injury but does admit to right shoulder previous rotator cuff repair several years ago. He has no other left shoulder complaints this time. The pain occurs every day and when active. Location of pain is anterior shoulder biceps. No history of dislocation. Symptoms are aggravated by ADL's, repetitive use, work at or above shoulder height, reaching behind him, difficulty sleeping on affected side. Symptoms are diminished by rest, avoiding the painful activities. Limited activities include: lifting, repetitive use, work at or above shoulder height, difficulty sleeping, difficulty with ADLs. Mild stiffness is reported. Related history: negative for prior surgery, trauma, arthritis or disorders. Is affecting ADLs. Pain is 9/10 at it's worst.    Outside reports reviewed: Previous note from Joanne Morfin reviewed    Patient's medications, allergies, past medical, surgical, social and family histories were reviewed and updated as appropriate.      Patient has previously attempted NSAIDs with mild pain relief    Medical History  Patient's medications, allergies, past medical, surgical, social and family histories were reviewed and updated as appropriate. Past Medical History:   Diagnosis Date    Abdominal pain, right lower quadrant     Asthma     Bee sting allergies     Decreased libido     Diabetes mellitus with nephropathy ([address]     Family history of coronary artery disease     Fatigue     Fatty liver disease, nonalcoholic     Hypertension     NEED EKG, CONSIDER CT CARDIAC SCORING    Screening for colorectal cancer     COLOGUARD NEG 8/2021- RECHECK DUE 3 YRS     Past Surgical History:   Procedure Laterality Date    ROTATOR CUFF REPAIR Right 2005    right     SEPTOPLASTY  1's     Family History   Problem Relation Age of Onset    High Cholesterol Mother     High Blood Pressure Mother     Diabetes Mother     Heart Disease Mother         CAD?  Thyroid Disease Mother         Hypothyroid     Social History     Socioeconomic History    Marital status:      Spouse name: None    Number of children: None    Years of education: None    Highest education level: None   Occupational History    Occupation: [employer]     Comment: fleet manager    Occupation: [employer] Occupation: [employer]     Comment: since 4/2019   Tobacco Use    Smoking status: Former Smoker     Packs/day: 0.50     Years: 10.00     Pack years: 5.00     Types: Cigarettes    Smokeless tobacco: Former User     Types: Chew    Tobacco comment: E Cig on regular basis   Substance and Sexual Activity    Alcohol use:  Yes     Alcohol/week: 0.0 standard drinks     Comment: Occasional     Drug use: Never    Sexual activity: None   Other Topics Concern    None   Social History Narrative    Diet: unrestricted    Exercise: A lot    Seat Belt: Mostly     Social Determinants of Health     Financial Resource Strain:     Difficulty of Paying Living Expenses:    Food Insecurity:     Worried About Running Out of Food in the Last Year:     [address] in the Last Year: Transportation Needs:     Lack of Transportation (Medical):  Lack of Transportation (Non-Medical):    Physical Activity:     Days of Exercise per Week:     Minutes of Exercise per Session:    Stress:     Feeling of Stress :    Social Connections:     Frequency of Communication with Friends and Family:     Frequency of Social Gatherings with Friends and Family:     Attends Judaism Services:     Active Member of Clubs or Organizations:     Attends Club or Organization Meetings:     Marital Status:    Intimate Partner Violence:     Fear of Current or Ex-Partner:     Emotionally Abused:     Physically Abused:     Sexually Abused:      Current Outpatient Medications   Medication Sig Dispense Refill    sildenafil (REVATIO) 20 MG tablet Take 1 tablet by mouth daily as needed (ED) 30 tablet 2    empagliflozin (JARDIANCE) 10 MG tablet Take 1 tablet by mouth daily 90 tablet 1    lisinopril (PRINIVIL;ZESTRIL) 2.5 MG tablet Take 1 tablet by mouth daily 90 tablet 1    aspirin (ASPIRIN CHILDRENS) 81 MG chewable tablet Take 1 tablet by mouth daily 30 tablet 3    rosuvastatin (CRESTOR) 5 MG tablet Take 1 tablet by mouth nightly 90 tablet 1     No current facility-administered medications for this visit. Allergies   Allergen Reactions    Metformin And Related Diarrhea    Nsaids      Has diabetic nephropathy         Review of Systems   Constitutional: Positive for activity change. Negative for fatigue and unexpected weight change. HENT: Negative for hearing loss, sneezing and sore throat. Respiratory: Negative for cough, shortness of breath and wheezing. Cardiovascular: Negative for chest pain and leg swelling. Gastrointestinal: Negative for diarrhea, nausea and vomiting. Musculoskeletal: Positive for arthralgias, joint swelling and myalgias. Negative for back pain, gait problem, neck pain and neck stiffness. Skin: Negative for color change, pallor, rash and wound.    Neurological: Negative for speech difficulty, weakness and numbness. Psychiatric/Behavioral: Negative for behavioral problems and confusion. The patient is not hyperactive. Examination:  General Exam:  Vitals: Pulse 78   Resp 16   Ht 6' 1\" (1.854 m)   Wt 206 lb (93.4 kg)   SpO2 98%   BMI 27.18 kg/m²    Physical Exam  Constitutional:       Appearance: Normal appearance. HENT:      Head: Normocephalic and atraumatic. Nose: Nose normal.   Eyes:      General:         Right eye: No discharge. Left eye: No discharge. Extraocular Movements: Extraocular movements intact. Cardiovascular:      Pulses: Normal pulses. Pulmonary:      Effort: Pulmonary effort is normal.      Breath sounds: Normal breath sounds. Musculoskeletal:         General: Tenderness present. No swelling, deformity or signs of injury. Right shoulder: Normal.      Left shoulder: Tenderness, bony tenderness and crepitus present. No swelling, deformity, effusion or laceration. Decreased range of motion. Normal strength. Normal pulse. Right upper arm: Normal.      Left upper arm: Normal.      Cervical back: Normal and normal range of motion. No rigidity or tenderness. Skin:     General: Skin is warm and dry. Capillary Refill: Capillary refill takes less than 2 seconds. Neurological:      General: No focal deficit present. Mental Status: He is alert and oriented to person, place, and time. LEFT SHOULDER / UPPER EXTREMITY EXAM      OBSERVATION:      Swelling: none   Deformity: none    Discoloration: none   Scapular: winging none    Scars: none    Atrophy: none      TENDERNESS / CREPITUS (T/C):      Clavicle -/-    SUPRAspinatus  -/-     AC Jt. -/-    INFRAspinatus -/-     SC Jt. -/-    Deltoid -/-     G.  Tuberosity +/-   LH BICEP groove +/-    Acromion: -/-    Midline Neck -/-     Scapular Spine -/-   Trapezium -/-    SMA Scapula -/-   GH jt. line - post +/- Scapulothoracic -/-       ROM: (* = with pain)  Left shoulder   Right shoulder     AROM (PROM)  AROM (PROM)    FE   160° (165°)    170° (175°)     ER 0°   60° (65°)   60° (65°)     ER 90° ABD  80° (80°)   90°  (90°)    IR 90° ABD  N/A (30°)   NA  (40°)     IR (spine) L5   T10      STRENGTH: (* = with pain) Left shoulder   Right shoulder    SCAPTION   5/5 *   5/5     IR    5/5 *   5/5     ER    5/5    5/5     BICEPS   5 -/5*    5/5     Deltoid   5/5    5/5       SIGNS:  LUE     NEER: neg    ODANIELLES: neg      CARRION: neg  SPEEDS: Positive    DROP ARM: neg  BELLY PRESS: neg    LIFT-OFF: neg  Superior escape: none     X-Body ADD: neg   MOVING VALGUS: neg     CRANK: Positive    Belly press: Negative      EXTREMITY NEURO-VASCULAR EXAM:     Sensation grossly intact to light touch all dermatomal regions. DTR 2+ Biceps, Triceps, BR and Negative Sarahs sign    Grossly intact motor function at Elbow, Wrist and Hand    Distal pulses radial and ulnar 2+, brisk cap refill, symmetric. NECK:  Painless FROM and spinous processes non-tender. Negative Spurlings sign. Diagnostic testing:  X-ray images were reviewed by myself and discussed with the patient:  MRI left shoulder demonstrates mild tendinosis of the supraspinatus but no obvious partial-thickness tearing. Tear of the superior and posterior superior labrum with degenerative leg labral fraying. Biceps tendinitis. AC joint osteoarthritis. No significant glenohumeral arthritis. Office Procedures:  No orders of the defined types were placed in this encounter.   Left subacromial Shoulder Injection Procedure Note   Pre-operative Diagnosis: Left rotator cuff tendinitis, labral tearing, biceps tendinitis  Post-operative Diagnosis: same   Indications: Symptomatic relief of tendinitis   Anesthesia: Lidocaine 1% and marcaine 0.5% without epinephrine without added sodium bicarbonate   Procedure Details   Verbal consent was obtained for the procedure. The shoulder was prepped with alcohol. A 22 gauge needle was advanced into the subacromial space through posterior approach without difficulty The space was then injected with 3 ml 1% lidocaine and 3 ml 0.5% marcaine and 1 ml of triamcinolone (KENALOG) 40mg/ml. The injection site was cleansed with isopropyl alcohol and a dressing was applied. Complications: None; patient tolerated the procedure well. Symptoms were improved immediately after the injection. Assessment and Plan    A: Left shoulder degenerative labral tearing    P:     I had a thorough discussion with the patient reviewing his MRI and treatment course. I explained that although he does have some labral tearing this is more degenerative in nature and because of his age I would not recommend any type of labral repair. Rather I suggested that we try conservative measures as this can provide severe pain relief and if he fails we can discuss shoulder arthroscopy including labral debridement as well as biceps tenodesis as well as other procedures associated. Patient voices understanding. A left shoulder subacromial injection was performed without complication and patient had mild immediate pain relief. Prescription for Tylenol provided. We reached out to the patient's primary care physician who states that he is not a candidate for NSAID medications due to his diabetic nephropathy. Patient will begin physical therapy. Prescription for therapy was provided. Patient will follow up in 8 weeks for reevaluation and discuss further treatment options including future injections versus operative intervention. All questions were answered and patient voices understanding.     Electronically signed by Cody Goode DO on 9/22/2021 at 8:42 AM

## 2021-09-22 NOTE — PROGRESS NOTES
Patient comes into today for a follow up of MRI results of the left shoulder. He rates his pain today at 0/10 currently but increases to 8-9/10 with reaching out. Patient states that he has not gotten better since his last visit. Patient had his MRI on 9/15/21. Impression   Tear of the superior and posterosuperior labrum without paralabral cyst.       Mild tendinosis of the supraspinatus tendon.       Mild degenerative changes to the [facility] joint.

## 2021-10-08 ENCOUNTER — HOSPITAL ENCOUNTER (OUTPATIENT)
Dept: PHYSICAL THERAPY | Age: 56
Setting detail: THERAPIES SERIES
Discharge: HOME | End: 2021-10-08
Payer: COMMERCIAL

## 2021-10-08 PROCEDURE — 97110 THERAPEUTIC EXERCISES: CPT

## 2021-10-08 PROCEDURE — 97162 PT EVAL MOD COMPLEX 30 MIN: CPT

## 2021-10-08 NOTE — FLOWSHEET NOTE
Outpatient Physical Therapy  [place]           [x] Phone: [phone]   Fax: [phone]  [place]           [] Phone: [phone]   Fax: [phone]        Physical Therapy Daily Treatment Note  Date:  10/8/2021    Patient Name:  Amilcar Arboleda    :  1965  MRN: [id number]  Restrictions/Precautions:  none  Diagnosis:   Diagnosis: Superior glenoid labrum lesion of L shoulder  Date of Injury/Surgery:   Treatment Diagnosis: Treatment Diagnosis: LUE weakness, impaired L shoulder ROM, impaired posture    Insurance/Certification information: PT Insurance Information: [facility] - [facility]   Referring Physician:  Referring Practitioner: Franco Arteaga DO  Next Doctor Visit:    Plan of care signed (Y/N):  Sent 10/8  Outcome Measure: Quick Dash: 40.9%  Visit# / total visits:   1/10  Pain level: 0/10     Goals:     Patient goals : avoid surgery  Short term goals  Time Frame for Short term goals: 5 visits  Short term goal 1: Pt will be IND with HEP in order to maximize recovery outside of clinic  Long term goals  Time Frame for Long term goals : 10 visits  Long term goal 1: Pt will improve L shoulder flex AROM to 150 or more to aide in ADLs  Long term goal 2: Pt will improve L shoulder ER AROM to 50 or more to aide in ADLs  Long term goal 3: Pt will improve quick dash to 30% or less to show [address] and subjective improvement  Long term goal 4: Pt will improve LUE strength to 4+/5 global to aide in ADLs    Summary of Evaluation: Assessment: Pt is a 56-year-old male who presents to therapy with L shoulder pain, insidious onset. Pt did have MRI showing tear of superior and posterosuperior labrum. Upon assessment, pt presents to therapy with impaired ROM of L shoulder, LUE weakness, impaired posture, L shoulder pain and overall reduced independence with ADLs and IADLs. Pt would benefit from skilled therapy interventions to address listed impairments, progress toward goal completion and improve ADL/IADL status.  PT also warranted to reduce risk for further injury or decline. Subjective:  See eval         Any changes in Ambulatory Summary Sheet? None        Objective:  See eval   COVID screening questions were asked and patient attested that there had been no contact or symptoms        Exercises: (No more than 4 columns)   Exercise/Equipment 10/8/21 #1 Date Date           WARM UP                     TABLE      Cane flex x10     Cane ER x10                          STANDING      rows RTB x10     IR/ ER x10 ea RTB                                        PROPRIOCEPTION                                    MODALITIES                      Other Therapeutic Activities/Education:  HEP and importance of completion, POC and goals, anatomy and physiology related to condition    Home Exercise Program: Issued, practiced and pt demo ability to perform 10/8/2021    Manual Treatments:  none      Modalities:  none      Communication with other providers:  POC sent      Assessment: Pt tolerated today's treatment without any adverse reactions or complications this date. End pain: same    Assessment: Pt is a 59-year-old male who presents to therapy with L shoulder pain, insidious onset. Pt did have MRI showing tear of superior and posterosuperior labrum. Upon assessment, pt presents to therapy with impaired ROM of L shoulder, LUE weakness, impaired posture, L shoulder pain and overall reduced independence with ADLs and IADLs. Pt would benefit from skilled therapy interventions to address listed impairments, progress toward goal completion and improve ADL/IADL status. PT also warranted to reduce risk for further injury or decline. Plan for Next Session: Specific instructions for Next Treatment: UBE, ROM, strength, reduce positions of superior labral stress, neuro jane.  POSTURE      Time In / Time Out:    0025 - 9718      Timed Code/Total Treatment Minutes:  48': 14' TE x1, 39' Eval x1      Next Progress Note due:  10th visit    Plan of Care Interventions:  [x] Therapeutic Exercise  [x] Modalities:  [x] Therapeutic Activity     [x] Ultrasound  [x] Estim  [] Gait Training      [] Cervical Traction [] Lumbar Traction  [x] Neuromuscular Re-education    [x] Cold/hotpack [] Iontophoresis   [x] Instruction in HEP      [x] Vasopneumatic   [] Dry Needling    [x] Manual Therapy               [] Aquatic Therapy              Electronically signed by:  Michael Bustos PT, DPT 10/8/2021, 12:41 PM

## 2021-10-08 NOTE — PLAN OF CARE
Outpatient Physical Therapy           [place]           [x] Phone: [phone]   Fax: [phone]  [place]           [] Phone: [phone]   Fax: [phone]     To: Referring Practitioner: Yousif Sanderson DO  From: Kiesha Lauren, PT, DPT     Patient: Denisse Garcia       : 1965  Diagnosis: Diagnosis: Superior glenoid labrum lesion of L shoulder   Treatment Diagnosis: Treatment Diagnosis: LUE weakness, impaired L shoulder ROM, impaired posture   Date: 10/8/2021    Physical Therapy Certification/Re-Certification Form  Dear Dr. Kathleen Barajas,  The following patient has been evaluated for physical therapy services and for therapy to continue, insurance requires physician review of the treatment plan initially and every 90 days. Please review the attached evaluation and/or summary of the patient's plan of care, and verify that you agree therapy should continue by signing the attached document and sending it back to our office. Assessment:    Assessment: Pt is a 82-year-old male who presents to therapy with L shoulder pain, insidious onset. Pt did have MRI showing tear of superior and posterosuperior labrum. Upon assessment, pt presents to therapy with impaired ROM of L shoulder, LUE weakness, impaired posture, L shoulder pain and overall reduced independence with ADLs and IADLs. Pt would benefit from skilled therapy interventions to address listed impairments, progress toward goal completion and improve ADL/IADL status. PT also warranted to reduce risk for further injury or decline. Patient agrees with established plan of care and assisted in the development of their short term and long term goals. Patient had no adverse reaction with initial treatment and there are no barriers to learning. Demonstrates no mental or cognitive disorder.         Plan of Care/Treatment to date:  [x] Therapeutic Exercise  [x] Modalities:  [x] Therapeutic Activity     [x] Ultrasound  [x] Electrical Stimulation  [] Gait Training      [] Cervical Traction [] Lumbar Traction  [x] Neuromuscular Re-education    [x] Cold/hotpack [] Iontophoresis   [x] Instruction in HEP      [x] Vasopneumatic    [] Dry Needling  [x] Manual Therapy               [] Aquatic Therapy       Other:          Frequency/Duration:  # Days per week: [] 1 day # Weeks: [] 1 week [x] 5 weeks     [x] 2 days   [] 2 weeks [] 6 weeks     [] 3 days   [] 3 weeks [] 7 weeks     [] 4 days   [] 4 weeks [] 8 weeks         [] 9 weeks [] 10 weeks         [] 11 weeks [] 12 weeks    Rehab Potential/Progress: [] Excellent [x] Good [] Fair  [] Poor     Goals:    Patient goals : avoid surgery  Short term goals  Time Frame for Short term goals: 5 visits  Short term goal 1: Pt will be IND with HEP in order to maximize recovery outside of clinic  Long term goals  Time Frame for Long term goals : 10 visits  Long term goal 1: Pt will improve L shoulder flex AROM to 150 or more to aide in ADLs  Long term goal 2: Pt will improve L shoulder ER AROM to 50 or more to aide in ADLs  Long term goal 3: Pt will improve quick dash to 30% or less to show [facility] and subjective improvement  Long term goal 4: Pt will improve LUE strength to 4+/5 global to aide in ADLs      Electronically signed by:  Ivory Ruano PT, DPT, 10/8/2021, 12:40 PM        If you have any questions or concerns, please don't hesitate to call.   Thank you for your referral.      Physician Signature:________________________________Date:_________ TIME: _____  By signing above, therapists plan is approved by physician

## 2021-10-08 NOTE — PROGRESS NOTES
Physical Therapy  Initial Assessment  Date: 10/8/2021  Patient Name: Sandra Sanderson  MRN: [id number]  : 1965     Treatment Diagnosis: LUE weakness, impaired L shoulder ROM, impaired posture    Restrictions: sup labral tear       Subjective   General  Chart Reviewed: Yes  Patient assessed for rehabilitation services?: Yes  Additional Pertinent Hx: OA, HTN, DM  Referring Practitioner: Lashonda Huynh DO  Referral Date : 21  Diagnosis: Superior glenoid labrum lesion of L shoulder  Follows Commands: Within Functional Limits  PT Visit Information  PT Insurance Information: [facility]  Subjective  Subjective: Pt notes that he has not had a direct injury to his L shoulder just general wear and tear, but MRI shows tear of superior and posterosuperior labrum. Pt is a  and pt notes that his pain does not affect his work presently. Pt is R handed in nature. Pt notes that MD does not want to do surgery on labrum but will do a tenodesis if PT does not work. He did have a cortisone injection and notes it did not help much. Pt denies radicular symptoms, no pain at reset, ache and some sharp pain with movement.   Pain Screening  Patient Currently in Pain: No  Vital Signs  Patient Currently in Pain: No    Vision/Hearing  Vision  Vision: Within Functional Limits  Hearing  Hearing: Within functional limits    Orientation  Orientation  Overall Orientation Status: Within Normal Limits    Social/Functional History  Social/Functional History  ADL Assistance: Independent  Homemaking Assistance: Independent  Ambulation Assistance: Independent  Transfer Assistance: Independent  Occupation: Full time employment  Type of occupation:     Objective     Observation/Palpation  Posture: Fair  Palpation: No TTP global L shoulder  Observation: Posture: ant shoulders w/ fwd head    AROM RUE (degrees)  RUE AROM : WFL  PROM LUE (degrees)  LUE PROM: Exceptions  L Shoulder Flex  0-170: ~130  L Shoulder ABduction 0-140: ~70-80  L Shoulder Int Rotation  0-70: ~65  L Shoulder Ext Rotation  0-90: 37  AROM LUE (degrees)  LUE AROM : Exceptions  L Shoulder Flexion 0-180: 126  L Shoulder Extension 0-45: ~40 deg  L Shoulder ABduction 0-180: 72  L Shoulder Int Rotation  0-70: 59  L Shoulder Ext Rotation  0-90: 36  Joint Mobility  ROM LUE: ant capsule tightness    Strength RUE  Strength RUE: WFL  Strength LUE  Strength LUE: Exception  L Shoulder Flexion: 4/5  L Shoulder Extension: 5/5  L Shoulder ABduction: 3-/5  L Shoulder Internal Rotation: 4+/5  L Shoulder External Rotation: 4/5  L Elbow Flexion: 5/5  L Elbow Extension: 5/5        Sensation  Overall Sensation Status: WFL       Assessment   Conditions Requiring Skilled Therapeutic Intervention  Body structures, Functions, Activity limitations: Decreased functional mobility ; Decreased ADL status; Decreased ROM; Decreased strength;Decreased high-level IADLs;Decreased posture; Increased pain  Assessment: Pt is a 82-year-old male who presents to therapy with L shoulder pain, insidious onset. Pt did have MRI showing tear of superior and posterosuperior labrum. Upon assessment, pt presents to therapy with impaired ROM of L shoulder, LUE weakness, impaired posture, L shoulder pain and overall reduced independence with ADLs and IADLs. Pt would benefit from skilled therapy interventions to address listed impairments, progress toward goal completion and improve ADL/IADL status. PT also warranted to reduce risk for further injury or decline. Treatment Diagnosis: LUE weakness, impaired L shoulder ROM, impaired posture  Prognosis: Good  Decision Making: Medium Complexity  History: see above. PLOF: independent  Exam: see above  Clinical Presentation: see above  Barriers to Learning: none. style: demo  REQUIRES PT FOLLOW UP: Yes  Treatment Initiated : yes on 10/8    Patient agrees with established plan of care and assisted in the development of their short term and long term goals.  Patient had no adverse reaction with initial treatment and there are no barriers to learning. Demonstrates no mental or cognitive disorder. Plan   Plan  Times per week: 2  Times per day: Daily  Plan weeks: 5  Specific instructions for Next Treatment: UBE, ROM, strength, reduce positions of superior labral stress, neuro jane.  POSTURE  Current Treatment Recommendations: Strengthening, IADL Training, Neuromuscular Re-education, Home Exercise Program, ROM, Safety Education & Training, Manual Therapy - Soft Tissue Mobilization, Patient/Caregiver Education & Training, Functional Mobility Training, Modalities, ADL/Self-care Training, Pain Management      OutComes Score   Quick Dash: 40.9%         Goals  Short term goals  Time Frame for Short term goals: 5 visits  Short term goal 1: Pt will be IND with HEP in order to maximize recovery outside of clinic  Long term goals  Time Frame for Long term goals : 10 visits  Long term goal 1: Pt will improve L shoulder flex AROM to 150 or more to aide in ADLs  Long term goal 2: Pt will improve L shoulder ER AROM to 50 or more to aide in ADLs  Long term goal 3: Pt will improve quick dash to 30% or less to show [address] and subjective improvement  Long term goal 4: Pt will improve LUE strength to 4+/5 global to aide in ADLs  Patient Goals   Patient goals : avoid surgery       Therapy Time: [address], PT, DPT

## 2021-10-19 ENCOUNTER — HOSPITAL ENCOUNTER (OUTPATIENT)
Dept: PHYSICAL THERAPY | Age: 56
Setting detail: THERAPIES SERIES
Discharge: HOME | End: 2021-10-19
Payer: COMMERCIAL

## 2021-10-19 PROCEDURE — 97140 MANUAL THERAPY 1/> REGIONS: CPT

## 2021-10-19 PROCEDURE — 97110 THERAPEUTIC EXERCISES: CPT

## 2021-10-19 NOTE — FLOWSHEET NOTE
Outpatient Physical Therapy  [place]           [x] Phone: [phone]   Fax: [phone]  [facility]           [] Phone: [phone]   Fax: [phone]        Physical Therapy Daily Treatment Note  Date:  10/19/2021    Patient Name:  Mega Thompson    :  1965  MRN: [id number]  Restrictions/Precautions:  none  Diagnosis:   Diagnosis: Superior glenoid labrum lesion of L shoulder  Date of Injury/Surgery:   Treatment Diagnosis: Treatment Diagnosis: LUE weakness, impaired L shoulder ROM, impaired posture    Insurance/Certification information: PT Insurance Information: [facility] - [facility] [employer]   Referring Physician:  Referring Practitioner: Eulogio Pickard DO  Next Doctor Visit:    Plan of care signed (Y/N):  Sent 10/8  Outcome Measure: Quick Dash: 40.9%  Visit# / total visits:   2/10  Pain level: 4/10 LUE     Goals:     Patient goals : avoid surgery  Short term goals  Time Frame for Short term goals: 5 visits  Short term goal 1: Pt will be IND with HEP in order to maximize recovery outside of clinic  Long term goals  Time Frame for Long term goals : 10 visits  Long term goal 1: Pt will improve L shoulder flex AROM to 150 or more to aide in ADLs  Long term goal 2: Pt will improve L shoulder ER AROM to 50 or more to aide in ADLs  Long term goal 3: Pt will improve quick dash to 30% or less to show [address] and subjective improvement  Long term goal 4: Pt will improve LUE strength to 4+/5 global to aide in ADLs    Summary of Evaluation: Assessment: Pt is a 82-year-old male who presents to therapy with L shoulder pain, insidious onset. Pt did have MRI showing tear of superior and posterosuperior labrum. Upon assessment, pt presents to therapy with impaired ROM of L shoulder, LUE weakness, impaired posture, L shoulder pain and overall reduced independence with ADLs and IADLs. Pt would benefit from skilled therapy interventions to address listed impairments, progress toward goal completion and improve ADL/IADL status.  PT also warranted to reduce risk for further injury or decline. Subjective:  Pt arrives to tx session reporting 4/10 pain LUE and C/O muscle knots. Any changes in Ambulatory Summary Sheet? None        Objective:  See eval   COVID screening questions were asked and patient attested that there had been no contact or symptoms    Required VC's for proper technique throughout tx session. Multiple muscle knots palpated throughout ant delt    Exercises: (No more than 4 columns)   Exercise/Equipment 10/8/21 #1 10/19/21 #2 Date           WARM UP      UBE  2'/2'          TABLE      Cane flex x10 2x10    Cane ER x10 2x10    Cane press up  2x10    SL flexion  2x10                   STANDING      rows RTB x10 2x10    IR/ ER x10 ea RTB 2x10                                       PROPRIOCEPTION                                    MODALITIES                      Other Therapeutic Activities/Education:  HEP and importance of completion, POC and goals, anatomy and physiology related to condition    Home Exercise Program: Issued, practiced and pt demo ability to perform 10/8/2021    Manual Treatments:  TPR/MFR related to hypertonicity       Modalities:  none      Communication with other providers:  POC sent      Assessment: Pt tolerated today's treatment without any adverse reactions or complications this date. Pt responds well to manual work gaining slight ROM and decrease in pain with motion. Pt did require VC's throughout tx for correct technique during exercises. End pain: same    Assessment: Pt is a 72-year-old male who presents to therapy with L shoulder pain, insidious onset. Pt did have MRI showing tear of superior and posterosuperior labrum. Upon assessment, pt presents to therapy with impaired ROM of L shoulder, LUE weakness, impaired posture, L shoulder pain and overall reduced independence with ADLs and IADLs.  Pt would benefit from skilled therapy interventions to address listed impairments, progress toward goal completion and improve ADL/IADL status. PT also warranted to reduce risk for further injury or decline. Plan for Next Session: Specific instructions for Next Treatment: UBE, ROM, strength, reduce positions of superior labral stress, neuro jane.  POSTURE      Time In / Time Out:   0800 / 0840      Timed Code/Total Treatment Minutes:  36' / 36'    2 TE   1 Man      Next Progress Note due:  10th visit    Plan of Care Interventions:  [x] Therapeutic Exercise  [x] Modalities:  [x] Therapeutic Activity     [x] Ultrasound  [x] Estim  [] Gait Training      [] Cervical Traction [] Lumbar Traction  [x] Neuromuscular Re-education    [x] Cold/hotpack [] Iontophoresis   [x] Instruction in HEP      [x] Vasopneumatic   [] Dry Needling    [x] Manual Therapy               [] Aquatic Therapy              Electronically signed by:  Finn Bojorquez PTA,  10/19/2021, 7:53 AM

## 2021-10-26 ENCOUNTER — HOSPITAL ENCOUNTER (OUTPATIENT)
Dept: PHYSICAL THERAPY | Age: 56
Setting detail: THERAPIES SERIES
Discharge: HOME | End: 2021-10-26
Payer: COMMERCIAL

## 2021-10-26 PROCEDURE — 97110 THERAPEUTIC EXERCISES: CPT

## 2021-10-26 PROCEDURE — 97112 NEUROMUSCULAR REEDUCATION: CPT

## 2021-10-26 NOTE — FLOWSHEET NOTE
Outpatient Physical Therapy  [place]           [x] Phone: [phone]   Fax: [phone]  [facility]           [] Phone: [phone]   Fax: [phone]        Physical Therapy Daily Treatment Note  Date:  10/26/2021    Patient Name:  Leontine Pallas    :  1965  MRN: [id number]  Restrictions/Precautions:  none  Diagnosis:   Diagnosis: Superior glenoid labrum lesion of L shoulder  Date of Injury/Surgery:   Treatment Diagnosis: Treatment Diagnosis: LUE weakness, impaired L shoulder ROM, impaired posture    Insurance/Certification information: PT Insurance Information: [facility] - [facility]   Referring Physician:  Referring Practitioner: Anna Powers DO  Next Doctor Visit:    Plan of care signed (Y/N):  yes  Outcome Measure: Quick Dash: 40.9%  Visit# / total visits:   3/10  Pain level: 0/10      Goals:     Patient goals : avoid surgery  Short term goals  Time Frame for Short term goals: 5 visits  Short term goal 1: Pt will be IND with HEP in order to maximize recovery outside of clinic MET 10/26  Long term goals  Time Frame for Long term goals : 10 visits  Long term goal 1: Pt will improve L shoulder flex AROM to 150 or more to aide in ADLs Good progress 10/26  Long term goal 2: Pt will improve L shoulder ER AROM to 50 or more to aide in ADLs  Long term goal 3: Pt will improve quick dash to 30% or less to show [address] and subjective improvement  Long term goal 4: Pt will improve LUE strength to 4+/5 global to aide in ADLs     Summary of Evaluation: Assessment: Pt is a 26-year-old male who presents to therapy with L shoulder pain, insidious onset. Pt did have MRI showing tear of superior and posterosuperior labrum. Upon assessment, pt presents to therapy with impaired ROM of L shoulder, LUE weakness, impaired posture, L shoulder pain and overall reduced independence with ADLs and IADLs.  Pt would benefit from skilled therapy interventions to address listed impairments, progress toward goal completion and improve ADL/IADL status. PT also warranted to reduce risk for further injury or decline. Subjective:  Pt is doing alright this date. He is not currently having any pain, some fatigue from just getting off a 12 hour shift. Any changes in Ambulatory Summary Sheet? None      Objective:    COVID screening questions were asked and patient attested that there had been no contact or symptoms    AROM  L shoulder flex: 141 deg  L shoulder ER: 29 deg    Exercises: (No more than 4 columns)   Exercise/Equipment 10/8/21 #1 10/19/21 #2 10/26/21 #3           WARM UP      UBE  2'/2' 2'/2'         TABLE      Cane flex x10 2x10 2x10, 2# therabar   Cane ER x10 2x10 2x10, 2# therabar   Cane press up  2x10 2x10, 2# therabar    SL flexion  2x10    SL ER   2x10, 2#   SL abd   2x10, 1#                  STANDING      rows RTB x10 2x10 2x10, RTB   IR/ ER x10 ea RTB 2x10 2x10 RTB ea    flexion   x10 no weight  x10 1#   Shoulder ext   2x10, RTB   Core roller    x10 flex  x10 scap                    PROPRIOCEPTION                                    MODALITIES                      Other Therapeutic Activities/Education:  HEP and importance of completion    Home Exercise Program: Issued, practiced and pt demo ability to perform 10/8/2021    Manual Treatments:  none    Modalities:  none    Communication with other providers:  POC sent    Assessment: Pt tolerated today's treatment without any adverse reactions or complications this date. Pt tolerated added weight and exercises well this date with some dull pain, but no sharp pain. Pt did show good improvement in AROM of L shoulder flexion compared to measure on eval. Pt would continue to benefit from skilled therapy interventions to address remaining impairments, improve mobility and strength and progress toward goal completion while reducing risk for re-injury or further decline.   End pain: 3/10    Plan for Next Session: UBE, ROM, strength, reduce positions of superior labral stress, neuro jane.  POSTURE    Time In / Time Out:  8843 - 7281    Timed Code/Total Treatment Minutes:  36': 16' NMR x1, 24' TE x2    Next Progress Note due:  10th visit    Plan of Care Interventions:  [x] Therapeutic Exercise  [x] Modalities:  [x] Therapeutic Activity     [x] Ultrasound  [x] Estim  [] Gait Training      [] Cervical Traction [] Lumbar Traction  [x] Neuromuscular Re-education    [x] Cold/hotpack [] Iontophoresis   [x] Instruction in HEP      [x] Vasopneumatic   [] Dry Needling    [x] Manual Therapy               [] Aquatic Therapy              Electronically signed by:  Abelardo Zheng PT, DPT 10/26/2021, 6:37 AM

## 2021-11-03 ENCOUNTER — HOSPITAL ENCOUNTER (OUTPATIENT)
Dept: PHYSICAL THERAPY | Age: 56
Setting detail: THERAPIES SERIES
Discharge: HOME | End: 2021-11-03
Payer: COMMERCIAL

## 2021-11-03 PROCEDURE — 97112 NEUROMUSCULAR REEDUCATION: CPT

## 2021-11-03 PROCEDURE — 97110 THERAPEUTIC EXERCISES: CPT

## 2021-11-03 NOTE — FLOWSHEET NOTE
Outpatient Physical Therapy  [place]           [x] Phone: [phone]   Fax: [phone]  [facility]           [] Phone: [phone]   Fax: [phone]        Physical Therapy Daily Treatment Note  Date:  11/3/2021    Patient Name:  Riley Capellan    :  1965  MRN: [id number]  Restrictions/Precautions:  none  Diagnosis:   Diagnosis: Superior glenoid labrum lesion of L shoulder  Date of Injury/Surgery:   Treatment Diagnosis: Treatment Diagnosis: LUE weakness, impaired L shoulder ROM, impaired posture    Insurance/Certification information: PT Insurance Information: [facility] - [facility]   Referring Physician:  Referring Practitioner: Tom Alejandre DO  Next Doctor Visit:    Plan of care signed (Y/N):  yes  Outcome Measure: Quick Dash: 40.9%  Visit# / total visits:   4/10  Pain level: 0/10      Goals:     Patient goals : avoid surgery  Short term goals  Time Frame for Short term goals: 5 visits  Short term goal 1: Pt will be IND with HEP in order to maximize recovery outside of clinic MET 10/26  Long term goals  Time Frame for Long term goals : 10 visits  Long term goal 1: Pt will improve L shoulder flex AROM to 150 or more to aide in ADLs Good progress 10/26  Long term goal 2: Pt will improve L shoulder ER AROM to 50 or more to aide in ADLs  Long term goal 3: Pt will improve quick dash to 30% or less to show [address] and subjective improvement  Long term goal 4: Pt will improve LUE strength to 4+/5 global to aide in ADLs     Summary of Evaluation: Assessment: Pt is a 82-year-old male who presents to therapy with L shoulder pain, insidious onset. Pt did have MRI showing tear of superior and posterosuperior labrum. Upon assessment, pt presents to therapy with impaired ROM of L shoulder, LUE weakness, impaired posture, L shoulder pain and overall reduced independence with ADLs and IADLs.  Pt would benefit from skilled therapy interventions to address listed impairments, progress toward goal completion and improve ADL/IADL status. PT also warranted to reduce risk for further injury or decline. Subjective:  Pt is doing well this date. He is not currently in any pain and did not have any problems over the weekend at his sons wedding with his shoulder. Any changes in Ambulatory Summary Sheet? None      Objective:    COVID screening questions were asked and patient attested that there had been no contact or symptoms    AROM  L shoulder flex: 141 deg  L shoulder ER: 29 deg    Exercises: (No more than 4 columns)   Exercise/Equipment 10/19/21 #2 10/26/21 #3 11/3/21 #4           WARM UP      UBE 2'/2' 2'/2' 2'/2'         TABLE      Cane flex 2x10 2x10, 2# therabar X15, 2# therabar   Cane ER 2x10 2x10, 2# therabar X15, 2# therabar    Cane press up 2x10 2x10, 2# therabar  X15, 2# therabar    SL flexion 2x10     SL ER  2x10, 2# 2x10, 3#   SL abd  2x10, 1# 2x10, 2#   Alphabet    X1, 2#    Randeen Fairly out and OH   x10, 2# therabar   STANDING      rows 2x10 2x10, RTB 2x10 GTB   IR/ ER 2x10 2x10 RTB ea  2x10 GTB ea   flexion  x10 no weight  x10 1#    Shoulder ext  2x10, RTB    Core roller   x10 flex  x10 scap                     PROPRIOCEPTION      Body blade   2x15\" horizontal and vertical each                           MODALITIES                      Other Therapeutic Activities/Education:  HEP and importance of completion    Home Exercise Program: Issued, practiced and pt demo ability to perform 10/8/2021    Manual Treatments:  none    Modalities:  none    Communication with other providers:  POC sent    Assessment: Pt tolerated today's treatment without any adverse reactions or complications this date. Pt did well with added exercises, no pain increase. Pt continues to have pain with OH movement, but is improving.  Pt would continue to benefit from skilled therapy interventions to address remaining impairments, improve mobility and strength and progress toward goal completion while reducing risk for re-injury or further decline. End pain: 3/10    Plan for Next Session: UBE, ROM, strength, reduce positions of superior labral stress, neuro jane.  POSTURE    Time In / Time Out:  0700 - 0738    Timed Code/Total Treatment Minutes:  45': 15' NMR x1, 23' TE x2    Next Progress Note due:  10th visit    Plan of Care Interventions:  [x] Therapeutic Exercise  [x] Modalities:  [x] Therapeutic Activity     [x] Ultrasound  [x] Estim  [] Gait Training      [] Cervical Traction [] Lumbar Traction  [x] Neuromuscular Re-education    [x] Cold/hotpack [] Iontophoresis   [x] Instruction in HEP      [x] Vasopneumatic   [] Dry Needling    [x] Manual Therapy               [] Aquatic Therapy              Electronically signed by:  Yuni Mars PT, DPT 11/3/2021, 6:44 AM

## 2021-11-18 ENCOUNTER — HOSPITAL ENCOUNTER (OUTPATIENT)
Dept: PHYSICAL THERAPY | Age: 56
Setting detail: THERAPIES SERIES
Discharge: HOME | End: 2021-11-18
Payer: COMMERCIAL

## 2021-11-18 PROCEDURE — 97530 THERAPEUTIC ACTIVITIES: CPT

## 2021-11-18 PROCEDURE — 97110 THERAPEUTIC EXERCISES: CPT

## 2021-11-18 NOTE — FLOWSHEET NOTE
Outpatient Physical Therapy  [place]           [x] Phone: [phone]   Fax: [phone]  [place]           [] Phone: [phone]   Fax: [phone]        Physical Therapy Daily Treatment Note  Date:  2021    Patient Name:  Lina Mendez    :  1965  MRN: [id number]  Restrictions/Precautions:  none  Diagnosis:   Diagnosis: Superior glenoid labrum lesion of L shoulder  Date of Injury/Surgery:   Treatment Diagnosis: Treatment Diagnosis: LUE weakness, impaired L shoulder ROM, impaired posture    Insurance/Certification information: PT Insurance Information: [facility] - 80 PCY   Referring Physician:  Referring Practitioner: Beatrice Frazier DO  Next Doctor Visit:  Dec 1st  Plan of care signed (Y/N):  yes  Outcome Measure: Quick Dash: 40.9%  Visit# / total visits:   5/10  Pain level: 7/10      Goals:     Patient goals : avoid surgery  Short term goals  Time Frame for Short term goals: 5 visits  Short term goal 1: Pt will be IND with HEP in order to maximize recovery outside of clinic MET 10/26  Long term goals  Time Frame for Long term goals : 10 visits  Long term goal 1: Pt will improve L shoulder flex AROM to 150 or more to aide in ADLs Good progress 10/26  Long term goal 2: Pt will improve L shoulder ER AROM to 50 or more to aide in ADLs  Long term goal 3: Pt will improve quick dash to 30% or less to show [address] and subjective improvement  Long term goal 4: Pt will improve LUE strength to 4+/5 global to aide in ADLs     Summary of Evaluation: Assessment: Pt is a 29-year-old male who presents to therapy with L shoulder pain, insidious onset. Pt did have MRI showing tear of superior and posterosuperior labrum. Upon assessment, pt presents to therapy with impaired ROM of L shoulder, LUE weakness, impaired posture, L shoulder pain and overall reduced independence with ADLs and IADLs.  Pt would benefit from skilled therapy interventions to address listed impairments, progress toward goal completion and improve ADL/IADL status. PT also warranted to reduce risk for further injury or decline. Subjective: Pt stated that his current pain was 7/10 due to just taking off his jacket. Pt stated that his work schedule has changed and he needs to change his appointments. Pt stated that his ROM has not improved and that he still gets a lot of pain with reaching back and reaching up. Any changes in Ambulatory Summary Sheet? None      Objective:    COVID screening questions were asked and patient attested that there had been no contact or symptoms    AROM  Fatigued quickly with BB  More difficulty with S/L abduction  Decreased ER  Cues to avoid shoulder hike with rows  Exercises: (No more than 4 columns)   Exercise/Equipment 10/19/21 #2 10/26/21 #3 11/3/21 #4 11/18/2021 #5            WARM UP       UBE 2'/2' 2'/2' 2'/2' 2'/2'          TABLE       Cane flex 2x10 2x10, 2# therabar X15, 2# therabar 2# bar 10x2    Cane ER 2x10 2x10, 2# therabar X15, 2# therabar  2# bar 10x2   Cane press up 2x10 2x10, 2# therabar  X15, 2# therabar  2# bar 10x2    SL flexion 2x10      SL ER  2x10, 2# 2x10, 3# 3# 10x2   SL abd  2x10, 1# 2x10, 2# 2#    Alphabet    X1, 2# 2# x 1    Therabar out and OH   x10, 2# therabar --   STANDING       rows 2x10 2x10, RTB 2x10 GTB GTB 10x2   IR/ ER 2x10 2x10 RTB ea  2x10 GTB ea GTB 10x2 ea   flexion  x10 no weight  x10 1#     Shoulder ext  2x10, RTB     Core roller   x10 flex  x10 scap                        PROPRIOCEPTION       Body blade   2x15\" horizontal and vertical each 2x15\" horizontal and vertical each                               MODALITIES                         Other Therapeutic Activities/Education:  HEP and importance of completion    Home Exercise Program: Issued, practiced and pt demo ability to perform 10/8/2021    Manual Treatments:  none    Modalities:  none    Communication with other providers:  POC sent    Assessment: Pt tolerated treatment fair today.  Pt had limited ROM in all directions and limited scapular mobility. Pt rated pain at 6/10 after treatment. Plan for Next Session: UBE, ROM, strength, reduce positions of superior labral stress, neuro jane.  POSTURE    Time In / Time Out:  1638/ 1715    Timed Code/Total Treatment Minutes:  38'/38' 2 TE (25') 1 TA (13')     Next Progress Note due:  10th visit    Plan of Care Interventions:  [x] Therapeutic Exercise  [x] Modalities:  [x] Therapeutic Activity     [x] Ultrasound  [x] Estim  [] Gait Training      [] Cervical Traction [] Lumbar Traction  [x] Neuromuscular Re-education    [x] Cold/hotpack [] Iontophoresis   [x] Instruction in HEP      [x] Vasopneumatic   [] Dry Needling    [x] Manual Therapy               [] Aquatic Therapy              Electronically signed by:  Kaila Chaudhry 11/18/2021, 2:38 PM     11/18/2021,6:12 PM

## 2021-11-18 NOTE — FLOWSHEET NOTE
Outpatient Physical Therapy  [place]           [x] Phone: [phone]   Fax: [phone]  [place]           [] Phone: [phone]   Fax: [phone]        Physical Therapy Daily Treatment Note  Date:  2021    Patient Name:  Taniya Arnett    :  1965  MRN: [id number]  Restrictions/Precautions:  none  Diagnosis:   Diagnosis: Superior glenoid labrum lesion of L shoulder  Date of Injury/Surgery:   Treatment Diagnosis: Treatment Diagnosis: LUE weakness, impaired L shoulder ROM, impaired posture    Insurance/Certification information: PT Insurance Information: [facility] - 80 PCY   Referring Physician:  Referring Practitioner: Albino Hall DO  Next Doctor Visit:  Dec 1st  Plan of care signed (Y/N):  yes  Outcome Measure: Quick Dash: 40.9%  Visit# / total visits:   5/10  Pain level: 7/10      Goals:     Patient goals : avoid surgery  Short term goals  Time Frame for Short term goals: 5 visits  Short term goal 1: Pt will be IND with HEP in order to maximize recovery outside of clinic MET 10/26  Long term goals  Time Frame for Long term goals : 10 visits  Long term goal 1: Pt will improve L shoulder flex AROM to 150 or more to aide in ADLs Good progress 10/26  Long term goal 2: Pt will improve L shoulder ER AROM to 50 or more to aide in ADLs  Long term goal 3: Pt will improve quick dash to 30% or less to show [address] and subjective improvement  Long term goal 4: Pt will improve LUE strength to 4+/5 global to aide in ADLs     Summary of Evaluation: Assessment: Pt is a 65-year-old male who presents to therapy with L shoulder pain, insidious onset. Pt did have MRI showing tear of superior and posterosuperior labrum. Upon assessment, pt presents to therapy with impaired ROM of L shoulder, LUE weakness, impaired posture, L shoulder pain and overall reduced independence with ADLs and IADLs.  Pt would benefit from skilled therapy interventions to address listed impairments, progress toward goal completion and improve ADL/IADL status. PT also warranted to reduce risk for further injury or decline. Subjective: Pt stated that his current pain was 7/10 due to just taking off his jacket. Pt stated that his work schedule has changed and he needs to change his appointments. Pt stated that his ROM has not improved and that he still gets a lot of pain with reaching back and reaching up. Any changes in Ambulatory Summary Sheet? None      Objective:    COVID screening questions were asked and patient attested that there had been no contact or symptoms    AROM  Fatigued quickly with BB  Decreased scap mobilty  More difficulty with S/L abduction  Decreased ER  Cues to avoid shoulder hike with rows  Exercises: (No more than 4 columns)   Exercise/Equipment 10/19/21 #2 10/26/21 #3 11/3/21 #4 11/18/2021 #5            WARM UP       UBE 2'/2' 2'/2' 2'/2' 2'/2'          TABLE       Cane flex 2x10 2x10, 2# therabar X15, 2# therabar 2# bar 10x2    Cane ER 2x10 2x10, 2# therabar X15, 2# therabar  2# bar 10x2   Cane press up 2x10 2x10, 2# therabar  X15, 2# therabar  2# bar 10x2    SL flexion 2x10      SL ER  2x10, 2# 2x10, 3# 3# 10x2   SL abd  2x10, 1# 2x10, 2# 2# 10x2   Alphabet    X1, 2# 2# x 1    Therabar out and OH   x10, 2# therabar --   STANDING       rows 2x10 2x10, RTB 2x10 GTB GTB 10x2   IR/ ER 2x10 2x10 RTB ea  2x10 GTB ea GTB 10x2 ea   flexion  x10 no weight  x10 1#     Shoulder ext  2x10, RTB     Core roller   x10 flex  x10 scap                        PROPRIOCEPTION       Body blade   2x15\" horizontal and vertical each 2x15\" horizontal and vertical each                               MODALITIES                         Other Therapeutic Activities/Education:  HEP and importance of completion    Home Exercise Program: Issued, practiced and pt demo ability to perform 10/8/2021    Manual Treatments:  none    Modalities: declined    Communication with other providers:  POC sent    Assessment: Pt tolerated treatment fair.  Pt needed some cueing for form and posture during exercises. Pt rated pain at 6/10 after treatment. Plan for Next Session: UBE, ROM, strength, reduce positions of superior labral stress, neuro jane.  POSTURE    Time In / Time Out:  1638/ 1716    Timed Code/Total Treatment Minutes:  38'/38' 2 TE ( 25') 1 TA (13')     Next Progress Note due:  10th visit    Plan of Care Interventions:  [x] Therapeutic Exercise  [x] Modalities:  [x] Therapeutic Activity     [x] Ultrasound  [x] Estim  [] Gait Training      [] Cervical Traction [] Lumbar Traction  [x] Neuromuscular Re-education    [x] Cold/hotpack [] Iontophoresis   [x] Instruction in HEP      [x] Vasopneumatic   [] Dry Needling    [x] Manual Therapy               [] Aquatic Therapy              Electronically signed Ata Kennedy 11/18/2021, 6:04 PM       11/18/2021,6:08 PM

## 2021-11-23 ENCOUNTER — HOSPITAL ENCOUNTER (OUTPATIENT)
Dept: PHYSICAL THERAPY | Age: 56
Setting detail: THERAPIES SERIES
Discharge: HOME | End: 2021-11-23
Payer: COMMERCIAL

## 2021-11-23 PROCEDURE — 97110 THERAPEUTIC EXERCISES: CPT

## 2021-11-23 PROCEDURE — 97112 NEUROMUSCULAR REEDUCATION: CPT

## 2021-11-23 NOTE — FLOWSHEET NOTE
Outpatient Physical Therapy  [place]           [x] Phone: [phone]   Fax: [phone]  [place]           [] Phone: [phone]   Fax: [phone]        Physical Therapy Daily Treatment Note  Date:  2021    Patient Name:  Attila Pulido    :  1965  MRN: [id number]  Restrictions/Precautions:  none  Diagnosis:   Diagnosis: Superior glenoid labrum lesion of L shoulder  Date of Injury/Surgery:   Treatment Diagnosis: Treatment Diagnosis: LUE weakness, impaired L shoulder ROM, impaired posture    Insurance/Certification information: PT Insurance Information: [facility] - 80 PCY   Referring Physician:  Referring Practitioner: Shelley Victoria DO  Next Doctor Visit:  Dec 1st  Plan of care signed (Y/N):  yes  Outcome Measure: Quick Dash: 40.9%  Visit# / total visits:   6/10  Pain level: 5/10      Goals:     Patient goals : avoid surgery  Short term goals  Time Frame for Short term goals: 5 visits  Short term goal 1: Pt will be IND with HEP in order to maximize recovery outside of clinic MET 10/26  Long term goals  Time Frame for Long term goals : 10 visits  Long term goal 1: Pt will improve L shoulder flex AROM to 150 or more to aide in ADLs Good progress 10/26  Long term goal 2: Pt will improve L shoulder ER AROM to 50 or more to aide in ADLs  Long term goal 3: Pt will improve quick dash to 30% or less to show [address] and subjective improvement  Long term goal 4: Pt will improve LUE strength to 4+/5 global to aide in ADLs     Summary of Evaluation: Assessment: Pt is a 74-year-old male who presents to therapy with L shoulder pain, insidious onset. Pt did have MRI showing tear of superior and posterosuperior labrum. Upon assessment, pt presents to therapy with impaired ROM of L shoulder, LUE weakness, impaired posture, L shoulder pain and overall reduced independence with ADLs and IADLs.  Pt would benefit from skilled therapy interventions to address listed impairments, progress toward goal completion and improve ADL/IADL status. PT also warranted to reduce risk for further injury or decline. Subjective: Pt arrives to therapy today with complaints of mild pain (5/10). Pt says he feels that the body blade made him very sore after last session on 11/23. Any changes in Ambulatory Summary Sheet? None      Objective:    COVID screening questions were asked and patient attested that there had been no contact or symptoms  Reduced ROM and pain at end ranges with cane Flex  SL ER regressed to 2# due to pain with 3#  Good ecc control with supinated shoulder flex  Exercises: (No more than 4 columns)   Exercise/Equipment 11/3/21 #4 11/18/2021 #5 11/23/21 #6           WARM UP      UBE 2'/2' 2'/2' 2'/2'         TABLE      Cane flex X15, 2# therabar 2# bar 10x2  2# bar 10x2   Cane ER X15, 2# therabar  2# bar 10x2 2# bar 10x2   Cane press up X15, 2# therabar  2# bar 10x2   2# bar 10x2   SL flexion      SL ER 2x10, 3# 3# 10x2 3# x10  2# x10 (regressed due to soreness/pain)   SL abd 2x10, 2# 2#  2# 2x10   Alphabet  X1, 2# 2# x 1 2# x1    Therabar out and OH x10, 2# therabar -- x10 2# therabar    STANDING      rows 2x10 GTB GTB 10x2 GTB  2x10   IR/ ER 2x10 GTB ea GTB 10x2 ea GTB 2x10 ea   flexion   2x10 supinated  ecc 1#   Shoulder ext   GTB 2x10   Core roller                        PROPRIOCEPTION      Body blade 2x15\" horizontal and vertical each 2x15\" horizontal and vertical each  ER/IR 3 x 20\"   Ball circles   x20 ea direction                     MODALITIES                      Other Therapeutic Activities/Education:  HEP and importance of completion    Home Exercise Program: Issued, practiced and pt demo ability to perform 10/8/2021    Manual Treatments:  none    Modalities:  none    Communication with other providers:  POC sent    Assessment: Pt tolerated treatment fairly well today. Pt has pain at end ranges of motion and has reduced ROM in flex and abd with exercises given today.   Pt completed ER/IR body blade well, showing good control with exercise and increased duration. With abduction, patient elevates shoulder blade for compensation due to pain/weakness. Pt will benefit from continued therapy to address strength, ROM, and pain to progress to goal completion to allow pt to return to PLOF. PT also warranted to reduce risk of future injury/decline. End pain: 6/10   Plan for Next Session: UBE, ROM, strength, reduce positions of superior labral stress, neuro jane.  POSTURE    Time In / Time Out:  2568-123    Timed Code/Total Treatment Minutes:  39'/39' :34' TE x2; 10' NMR x1     Next Progress Note due:  10th visit    Plan of Care Interventions:  [x] Therapeutic Exercise  [x] Modalities:  [x] Therapeutic Activity     [x] Ultrasound  [x] Estim  [] Gait Training      [] Cervical Traction [] Lumbar Traction  [x] Neuromuscular Re-education    [x] Cold/hotpack [] Iontophoresis   [x] Instruction in HEP      [x] Vasopneumatic   [] Dry Needling    [x] Manual Therapy               [] Aquatic Therapy              Electronically signed by:  Srinivas Goldstein, SPT 11/23/2021, 6:59 AM     11/23/2021,6:59 AM

## 2021-11-30 ENCOUNTER — HOSPITAL ENCOUNTER (OUTPATIENT)
Dept: PHYSICAL THERAPY | Age: 56
Setting detail: THERAPIES SERIES
Discharge: HOME | End: 2021-11-30
Payer: COMMERCIAL

## 2021-11-30 PROCEDURE — 97110 THERAPEUTIC EXERCISES: CPT

## 2021-11-30 NOTE — FLOWSHEET NOTE
Outpatient Physical Therapy  [place]           [x] Phone: [phone]   Fax: [phone]  [place]           [] Phone: [phone]   Fax: [phone]        Physical Therapy Daily Treatment Note  Date:  2021    Patient Name:  Amilcar Arboleda    :  1965  MRN: [id number]  Restrictions/Precautions:  none  Diagnosis:   Diagnosis: Superior glenoid labrum lesion of L shoulder  Date of Injury/Surgery:   Treatment Diagnosis: Treatment Diagnosis: LUE weakness, impaired L shoulder ROM, impaired posture    Insurance/Certification information: PT Insurance Information: [facility] - 80 PCY   Referring Physician:  Referring Practitioner: Franco Arteaga DO  Next Doctor Visit:  Dec 1st  Plan of care signed (Y/N):  yes  Outcome Measure: Quick Dash: 43.18%  Visit# / total visits:   7/10  Pain level: 4/10      Goals:     Patient goals : avoid surgery  Short term goals  Time Frame for Short term goals: 5 visits  Short term goal 1: Pt will be IND with HEP in order to maximize recovery outside of clinic ReMET   Long term goals  Time Frame for Long term goals : 10 visits  Long term goal 1: Pt will improve L shoulder flex AROM to 150 or more to aide in ADLs Not met   Long term goal 2: Pt will improve L shoulder ER AROM to 50 or more to aide in ADLs Not met   Long term goal 3: Pt will improve quick dash to 30% or less to show [address] and subjective improvement Not met   Long term goal 4: Pt will improve LUE strength to 4+/5 global to aide in ADLs Some progress     Summary of Evaluation: Assessment: Pt is a 61-year-old male who presents to therapy with L shoulder pain, insidious onset. Pt did have MRI showing tear of superior and posterosuperior labrum. Upon assessment, pt presents to therapy with impaired ROM of L shoulder, LUE weakness, impaired posture, L shoulder pain and overall reduced independence with ADLs and IADLs.  Pt would benefit from skilled therapy interventions to address listed impairments, progress toward goal completion and improve ADL/IADL status. PT also warranted to reduce risk for further injury or decline. Subjective: Pt notes that overall he has not seen an improvement in pain or ROM since therapy start. He is still having pain and trouble with overhead motion and activity with the LUE. He feels therapy is not helping and will see the MD tomorrow to discuss the option of surgery. He wishes to dc this date. Quick Dash: 43.18%    Any changes in Ambulatory Summary Sheet?   None      Objective:    COVID screening questions were asked and patient attested that there had been no contact or symptoms    AROM L shoulder (in deg):  Flex: 119  Abd: 76  ER: 16    Strength LUE:   L Shoulder Flexion: 4/5  L Shoulder Extension: 5/5  L Shoulder ABduction: 3-/5  L Shoulder Internal Rotation: 4+/5  L Shoulder External Rotation: 4+/5  L Elbow Flexion: 5/5  L Elbow Extension: 5/5    Exercises: (No more than 4 columns)   Exercise/Equipment 11/18/2021 #5 11/23/21 #6 11/30/21 #7           WARM UP      UBE 2'/2' 2'/2' 2'/2'         TABLE      Cane flex 2# bar 10x2  2# bar 10x2    Cane ER 2# bar 10x2 2# bar 10x2    Cane press up 2# bar 10x2   2# bar 10x2    SL flexion      SL ER 3# 10x2 3# x10  2# x10 (regressed due to soreness/pain)    SL abd 2#  2# 2x10    Alphabet  2# x 1 2# x1     Therabar out and OH -- x10 2# therabar     STANDING      rows GTB 10x2 GTB  2x10 GTB x10   IR/ ER GTB 10x2 ea GTB 2x10 ea GTb x10 ea   flexion  2x10 supinated  ecc 1#    Shoulder ext  GTB 2x10 GTB x10   Core roller                        PROPRIOCEPTION      Body blade 2x15\" horizontal and vertical each  ER/IR 3 x 20\"    Ball circles  x20 ea direction                      MODALITIES                      Other Therapeutic Activities/Education:  HEP and importance of completion until surgery for improved outcomes post-op    Home Exercise Program: Issued, practiced and pt demo ability to perform 10/8/2021    Manual Treatments:  none    Modalities:  none    Communication with other providers:  POC sent    Assessment: Pt tolerated today's treatment without any adverse reactions or complications this date. Pt has shown some progress with strength since therapy start, but his ROM pain and overall subjective report have not changed since therapy start. Pt has hit a plataeu at this time and will see the MD tomorrow to discuss option of surgery due to no significant improvement with conservative therapy. PT educated pt on importance of continued HEP for improved outcomes post-op. Pt dc this date. End pain: 6/10     Plan for Next Session: UBE, ROM, strength, reduce positions of superior labral stress, neuro jane.  POSTURE    Time In / Time Out:  1300 - 1327    Timed Code/Total Treatment Minutes: 27': 32' TE x2    Next Progress Note due:  10th visit    Plan of Care Interventions:  [x] Therapeutic Exercise  [x] Modalities:  [x] Therapeutic Activity     [x] Ultrasound  [x] Estim  [] Gait Training      [] Cervical Traction [] Lumbar Traction  [x] Neuromuscular Re-education    [x] Cold/hotpack [] Iontophoresis   [x] Instruction in HEP      [x] Vasopneumatic   [] Dry Needling    [x] Manual Therapy               [] Aquatic Therapy              Electronically signed by:  Brock Villanueva PT, DPT 11/30/2021, 6:53 AM     11/30/2021,6:53 AM

## 2021-11-30 NOTE — DISCHARGE SUMMARY
Outpatient Physical Therapy           [place]           [x] Phone: [phone]   Fax: [phone]  [place]           [] Phone: [phone]   Fax: [phone]      To:   Shelley Victoria DO    From: Brittany Burns, PT, DPT     Patient: Attila Breaker                  : 1965  Diagnosis:    Superior glenoid labrum lesion of L shoulder   Date: 2021  Treatment Diagnosis:   LUE weakness, impaired L shoulder ROM, impaired posture        []  Progress Note                [x]  Discharge Note    Evaluation Date:  10/08/21  Total Visits to date: 7   Cancels/No-shows to date:  2    Subjective:  Pt notes that overall he has not seen an improvement in pain or ROM since therapy start. He is still having pain and trouble with overhead motion and activity with the LUE. He feels therapy is not helping and will see the MD tomorrow to discuss the option of surgery. He wishes to dc this date. Quick Dash: 43.18%    Plan of Care/Treatment to date:  [x] Therapeutic Exercise    [x] Modalities:  [x] Therapeutic Activity     [] Ultrasound  [] Electrical Stimulation  [] Gait Training      [] Cervical Traction   [] Lumbar Traction  [x] Neuromuscular Re-education  [] Cold/hotpack [] Iontophoresis  [x] Instruction in HEP      Other:  [x] Manual Therapy       [x]  Vasopneumatic  [] Aquatic Therapy       []   Dry Needle Therapy                      Objective/Significant Findings At Last Visit/Comments:    AROM L shoulder (in deg):  Flex: 119  Abd: 76  ER: 16     Strength LUE:   L Shoulder Flexion: 4/5  L Shoulder Extension: 5/5  L Shoulder ABduction: 3-/5  L Shoulder Internal Rotation: 4+/5  L Shoulder External Rotation: 4+/5  L Elbow Flexion: 5/5  L Elbow Extension: 5/5    Assessment:   Pt has shown some progress with strength since therapy start and did show progression with exercises during therapy, but his ROM pain and overall subjective report outside of clinic have not changed since therapy start.  Pt has hit a

## 2021-12-01 ENCOUNTER — OFFICE VISIT (OUTPATIENT)
Dept: ORTHOPEDIC SURGERY | Age: 56
End: 2021-12-01
Payer: COMMERCIAL

## 2021-12-01 VITALS
OXYGEN SATURATION: 97 % | HEIGHT: 73 IN | RESPIRATION RATE: 16 BRPM | HEART RATE: 81 BPM | BODY MASS INDEX: 26.51 KG/M2 | WEIGHT: 200 LBS

## 2021-12-01 DIAGNOSIS — S43.432A SUPERIOR GLENOID LABRUM LESION OF LEFT SHOULDER, INITIAL ENCOUNTER: Primary | ICD-10-CM

## 2021-12-01 PROCEDURE — 99213 OFFICE O/P EST LOW 20 MIN: CPT | Performed by: STUDENT IN AN ORGANIZED HEALTH CARE EDUCATION/TRAINING PROGRAM

## 2021-12-01 PROCEDURE — G8427 DOCREV CUR MEDS BY ELIG CLIN: HCPCS | Performed by: STUDENT IN AN ORGANIZED HEALTH CARE EDUCATION/TRAINING PROGRAM

## 2021-12-01 PROCEDURE — 3017F COLORECTAL CA SCREEN DOC REV: CPT | Performed by: STUDENT IN AN ORGANIZED HEALTH CARE EDUCATION/TRAINING PROGRAM

## 2021-12-01 PROCEDURE — G8484 FLU IMMUNIZE NO ADMIN: HCPCS | Performed by: STUDENT IN AN ORGANIZED HEALTH CARE EDUCATION/TRAINING PROGRAM

## 2021-12-01 PROCEDURE — G8419 CALC BMI OUT NRM PARAM NOF/U: HCPCS | Performed by: STUDENT IN AN ORGANIZED HEALTH CARE EDUCATION/TRAINING PROGRAM

## 2021-12-01 PROCEDURE — 1036F TOBACCO NON-USER: CPT | Performed by: STUDENT IN AN ORGANIZED HEALTH CARE EDUCATION/TRAINING PROGRAM

## 2021-12-01 ASSESSMENT — ENCOUNTER SYMPTOMS
COLOR CHANGE: 0
NAUSEA: 0
SHORTNESS OF BREATH: 0
WHEEZING: 0
VOMITING: 0
COUGH: 0
DIARRHEA: 0
SORE THROAT: 0
BACK PAIN: 0

## 2021-12-01 NOTE — PROGRESS NOTES
Patient comes in today for a follow up of a left SLAP tear post steroid injection on 9/22/21. He states the injection did not help. He is currently in physical therapy and states that therapy aggravates his left shoulder. His ROM is still limited. He rates his pain at 3/10 currently. He denies any new falls or injuries.    Dominant hand: right  Occupation:

## 2021-12-01 NOTE — PROGRESS NOTES
12/1/2021   Chief Complaint   Patient presents with    Shoulder Pain     left        History of Present Illness: Patient is here for reevaluation of his left shoulder. Patient completed physical therapy and states he had improvement in his length and range of motion but continues with pain with the biceps. No new issues at this time. Previous HPI (9/22/2021)               Loretta Sheffield is a 49 y.o. male right handed patient referred by DARYL Nagy for evaluation and treatment left shoulder pain. He is here today to review MRI of the left shoulder and discuss treatment options. He has had no treatment to the left shoulder to this point including a type of injection or therapy. Patient works as a technician and labor. Patient states that he has had progressive left shoulder pain for approximately 6 months. He has no specific injury but states that he noticed he was having difficulty using his arm at work due to significant pain especially over the biceps tendon. He has noticed that he has had worsening range of motion due to the pain. He has had no prior left shoulder injury but does admit to right shoulder previous rotator cuff repair several years ago. He has no other left shoulder complaints this time. The pain occurs every day and when active. Location of pain is anterior shoulder biceps. No history of dislocation. Symptoms are aggravated by ADL's, repetitive use, work at or above shoulder height, reaching behind him, difficulty sleeping on affected side. Symptoms are diminished by rest, avoiding the painful activities. Limited activities include: lifting, repetitive use, work at or above shoulder height, difficulty sleeping, difficulty with ADLs. Mild stiffness is reported. Related history: negative for prior surgery, trauma, arthritis or disorders. Is affecting ADLs.   Pain is 9/10 at it's worst.    Outside reports reviewed: Previous note from Nitin Rogers, [address]    Seat Belt: Mostly     Social Determinants of Health     Financial Resource Strain:     Difficulty of Paying Living Expenses: Not on file   Food Insecurity:     Worried About Running Out of Food in the Last Year: Not on file    Da[place] of Food in the Last Year: Not on file   Transportation Needs:     Lack of Transportation (Medical): Not on file    Lack of Transportation (Non-Medical):  Not on file   Physical Activity:     Days of Exercise per Week: Not on file    Minutes of Exercise per Session: Not on file   Stress:     Feeling of Stress : Not on file   Social Connections:     Frequency of Communication with Friends and Family: Not on file    Frequency of Social Gatherings with Friends and Family: Not on file    Attends Sabianist Services: Not on file    Active Member of [address] [place] [address] [employer] or Organizations: Not on file    Attends Club or Organization Meetings: Not on file    Marital Status: Not on file   Intimate Partner Violence:     Fear of Current or Ex-Partner: Not on file    Emotionally Abused: Not on file    Physically Abused: Not on file    Sexually Abused: Not on file   Housing Stability:     Unable to Pay for Housing in the Last Year: Not on file    Number of [place] in the Last Year: Not on file    Unstable Housing in the Last Year: Not on file     Current Outpatient Medications   Medication Sig Dispense Refill    empagliflozin (JARDIANCE) 10 MG tablet Take 1 tablet by mouth daily 90 tablet 1    lisinopril (PRINIVIL;ZESTRIL) 2.5 MG tablet Take 1 tablet by mouth daily 90 tablet 1    aspirin (ASPIRIN CHILDRENS) 81 MG chewable tablet Take 1 tablet by mouth daily 30 tablet 3    rosuvastatin (CRESTOR) 5 MG tablet Take 1 tablet by mouth nightly 90 tablet 1    acetaminophen (TYLENOL) 500 MG tablet Take 2 tablets by mouth 3 times daily as needed for Pain (Patient not taking: Reported on 12/1/2021) 180 tablet 0    sildenafil (REVATIO) 20 MG tablet Take 1 tablet by mouth daily as needed (ED) (Patient not taking: Reported on 12/1/2021) 30 tablet 2     No current facility-administered medications for this visit. Allergies   Allergen Reactions    Metformin And Related Diarrhea    Nsaids      Has diabetic nephropathy         Review of Systems   Constitutional: Positive for activity change. Negative for fatigue and unexpected weight change. HENT: Negative for hearing loss, sneezing and sore throat. Respiratory: Negative for cough, shortness of breath and wheezing. Cardiovascular: Negative for chest pain and leg swelling. Gastrointestinal: Negative for diarrhea, nausea and vomiting. Musculoskeletal: Positive for arthralgias, joint swelling and myalgias. Negative for back pain, gait problem, neck pain and neck stiffness. Skin: Negative for color change, pallor, rash and wound. Neurological: Negative for speech difficulty, weakness and numbness. Psychiatric/Behavioral: Negative for behavioral problems and confusion. The patient is not hyperactive. Examination:  General Exam:  Vitals: Pulse 81   Resp 16   Ht 6' 1\" (1.854 m)   Wt 200 lb (90.7 kg)   SpO2 97%   BMI 26.39 kg/m²    Physical Exam  Constitutional:       Appearance: Normal appearance. HENT:      Head: Normocephalic and atraumatic. Nose: Nose normal.   Eyes:      General:         Right eye: No discharge. Left eye: No discharge. Extraocular Movements: Extraocular movements intact. Cardiovascular:      Pulses: Normal pulses. Pulmonary:      Effort: Pulmonary effort is normal.      Breath sounds: Normal breath sounds. Musculoskeletal:         General: Tenderness present. No swelling, deformity or signs of injury. Right shoulder: Normal.      Left shoulder: Tenderness, bony tenderness and crepitus present. No swelling, deformity, effusion or laceration. Decreased range of motion. Normal strength. Normal pulse.       Right upper arm: Normal. Left upper arm: Normal.      Cervical back: Normal and normal range of motion. No rigidity or tenderness. Skin:     General: Skin is warm and dry. Capillary Refill: Capillary refill takes less than 2 seconds. Neurological:      General: No focal deficit present. Mental Status: He is alert and oriented to person, place, and time. LEFT SHOULDER / UPPER EXTREMITY EXAM      OBSERVATION:      Swelling: none   Deformity: none    Discoloration: none   Scapular: winging none    Scars: none    Atrophy: none      TENDERNESS / CREPITUS (T/C):      Clavicle -/-    SUPRAspinatus  -/-     AC Jt. +/-    INFRAspinatus -/-     SC Jt. -/-    Deltoid -/-     G. Tuberosity +/-   LH BICEP groove +/-    Acromion: -/-    Midline Neck -/-     Scapular Spine -/-   Trapezium -/-    SMA Scapula -/-   GH jt. line - post +/-     Scapulothoracic -/-       ROM: (* = with pain)  Left shoulder   Right shoulder     AROM (PROM)  AROM (PROM)    FE   170° (175°)    170° (175°)     ER 0°   60° (65°)   60° (65°)     ER 90° ABD  90° (90°)   90°  (90°)    IR 90° ABD  N/A (30°)   NA  (40°)     IR (spine) L5   T10      STRENGTH: (* = with pain) Left shoulder   Right shoulder    SCAPTION   5/5    5/5     IR    5/5    5/5     ER    5/5    5/5     BICEPS   5 -/5*    5/5     Deltoid   5/5    5/5       SIGNS:  LUE     NEER: neg    ODANIELLES: neg      CARRION: neg  SPEEDS: Positive    DROP ARM: neg  BELLY PRESS: neg    LIFT-OFF: neg  Superior escape: none     X-Body ADD: neg   MOVING VALGUS: neg     CRANK: Positive    Belly press: Negative      EXTREMITY NEURO-VASCULAR EXAM:     Sensation grossly intact to light touch all dermatomal regions. DTR 2+ Biceps, Triceps, BR and Negative Sarahs sign    Grossly intact motor function at Elbow, Wrist and Hand    Distal pulses radial and ulnar 2+, brisk cap refill, symmetric. NECK:  Painless FROM and spinous processes non-tender. Negative Spurlings sign.         Diagnostic testing:  No new imaging    Previous imaging:  X-ray images were reviewed by myself and discussed with the patient:  MRI left shoulder demonstrates mild tendinosis of the supraspinatus but no obvious partial-thickness tearing. Tear of the superior and posterior superior labrum with degenerative leg labral fraying. Biceps tendinitis. AC joint osteoarthritis. No significant glenohumeral arthritis. Office Procedures:  No orders of the defined types were placed in this encounter. Assessment and Plan    A: Left shoulder degenerative labral tearing    P:   I had a thorough discussion with the patient regarding his left shoulder MRI and treatment course. Because he has failed conservative measures I feel that he is a candidate for surgery. I discussed surgical intervention in the form of:    Left shoulder arthroscopic  1. Rotator cuff debridement versus repair  2. Subacromial decompression  3. Distal Clavicle Excision  4. Arthroscopic versus open biceps tenodesis  5. Debridement    I explained risks, benefits, possible complications of the procedure and answered all questions for the patient. I explained postoperative rehabilitation protocol and expectations with the patient today. The patient understands and consents to the procedure. Patient will follow up with their primary care physician prior to surgical treatment for preoperative clearance.     Electronically signed by Eulogio Pickard DO on 12/1/2021 at 8:57 AM

## 2021-12-29 ENCOUNTER — TELEPHONE (OUTPATIENT)
Dept: ORTHOPEDIC SURGERY | Age: 56
End: 2021-12-29

## 2022-01-18 NOTE — TELEPHONE ENCOUNTER
I called pt and spoke with pt about a possibly scheduling surgery on 1/31/22. He stated that he would discuss it with his wife and call our office back.

## 2022-01-21 ENCOUNTER — OFFICE VISIT (OUTPATIENT)
Dept: INTERNAL MEDICINE CLINIC | Age: 57
End: 2022-01-21
Payer: COMMERCIAL

## 2022-01-21 VITALS
SYSTOLIC BLOOD PRESSURE: 110 MMHG | HEART RATE: 72 BPM | RESPIRATION RATE: 14 BRPM | OXYGEN SATURATION: 97 % | BODY MASS INDEX: 27.7 KG/M2 | HEIGHT: 73 IN | DIASTOLIC BLOOD PRESSURE: 70 MMHG | WEIGHT: 209 LBS

## 2022-01-21 DIAGNOSIS — E11.21 TYPE 2 DIABETES MELLITUS WITH DIABETIC NEPHROPATHY, WITHOUT LONG-TERM CURRENT USE OF INSULIN (HCC): Primary | ICD-10-CM

## 2022-01-21 DIAGNOSIS — Z00.00 PREVENTATIVE HEALTH CARE: ICD-10-CM

## 2022-01-21 DIAGNOSIS — I10 ESSENTIAL HYPERTENSION: ICD-10-CM

## 2022-01-21 DIAGNOSIS — E11.21 TYPE 2 DIABETES MELLITUS WITH DIABETIC NEPHROPATHY, WITHOUT LONG-TERM CURRENT USE OF INSULIN (HCC): ICD-10-CM

## 2022-01-21 DIAGNOSIS — J45.20 MILD INTERMITTENT ASTHMA WITHOUT COMPLICATION: ICD-10-CM

## 2022-01-21 DIAGNOSIS — I10 PRIMARY HYPERTENSION: ICD-10-CM

## 2022-01-21 DIAGNOSIS — Z12.5 SCREENING FOR MALIGNANT NEOPLASM OF PROSTATE: ICD-10-CM

## 2022-01-21 DIAGNOSIS — K76.0 FATTY LIVER DISEASE, NONALCOHOLIC: ICD-10-CM

## 2022-01-21 LAB
A/G RATIO: 1.8 (ref 1.1–2.2)
ALBUMIN SERPL-MCNC: 4.5 G/DL (ref 3.4–5)
ALP BLD-CCNC: 103 U/L (ref 40–129)
ALT SERPL-CCNC: 26 U/L (ref 10–40)
ANION GAP SERPL CALCULATED.3IONS-SCNC: 11 MMOL/L (ref 3–16)
AST SERPL-CCNC: 19 U/L (ref 15–37)
BASOPHILS ABSOLUTE: 0 K/UL (ref 0–0.2)
BASOPHILS RELATIVE PERCENT: 0.7 %
BILIRUB SERPL-MCNC: 0.5 MG/DL (ref 0–1)
BUN BLDV-MCNC: 11 MG/DL (ref 7–20)
CALCIUM SERPL-MCNC: 9.4 MG/DL (ref 8.3–10.6)
CHLORIDE BLD-SCNC: 105 MMOL/L (ref 99–110)
CHOLESTEROL, TOTAL: 96 MG/DL (ref 0–199)
CO2: 26 MMOL/L (ref 21–32)
CREAT SERPL-MCNC: 0.9 MG/DL (ref 0.9–1.3)
CREATININE URINE: 121.1 MG/DL (ref 39–259)
EOSINOPHILS ABSOLUTE: 0.2 K/UL (ref 0–0.6)
EOSINOPHILS RELATIVE PERCENT: 3.2 %
GFR AFRICAN AMERICAN: >60
GFR NON-AFRICAN AMERICAN: >60
GLUCOSE BLD-MCNC: 174 MG/DL (ref 70–99)
HCT VFR BLD CALC: 48.9 % (ref 40.5–52.5)
HDLC SERPL-MCNC: 45 MG/DL (ref 40–60)
HEMOGLOBIN: 16.5 G/DL (ref 13.5–17.5)
HEPATITIS C ANTIBODY INTERPRETATION: NORMAL
LDL CHOLESTEROL CALCULATED: 43 MG/DL
LYMPHOCYTES ABSOLUTE: 1.5 K/UL (ref 1–5.1)
LYMPHOCYTES RELATIVE PERCENT: 24.9 %
MCH RBC QN AUTO: 30.7 PG (ref 26–34)
MCHC RBC AUTO-ENTMCNC: 33.7 G/DL (ref 31–36)
MCV RBC AUTO: 91.2 FL (ref 80–100)
MICROALBUMIN UR-MCNC: <1.2 MG/DL
MICROALBUMIN/CREAT UR-RTO: NORMAL MG/G (ref 0–30)
MONOCYTES ABSOLUTE: 0.5 K/UL (ref 0–1.3)
MONOCYTES RELATIVE PERCENT: 9 %
NEUTROPHILS ABSOLUTE: 3.8 K/UL (ref 1.7–7.7)
NEUTROPHILS RELATIVE PERCENT: 62.2 %
PDW BLD-RTO: 12.7 % (ref 12.4–15.4)
PLATELET # BLD: 242 K/UL (ref 135–450)
PMV BLD AUTO: 8.4 FL (ref 5–10.5)
POTASSIUM SERPL-SCNC: 4.6 MMOL/L (ref 3.5–5.1)
PROSTATE SPECIFIC ANTIGEN: 1.28 NG/ML (ref 0–4)
RBC # BLD: 5.36 M/UL (ref 4.2–5.9)
SODIUM BLD-SCNC: 142 MMOL/L (ref 136–145)
TOTAL PROTEIN: 7 G/DL (ref 6.4–8.2)
TRIGL SERPL-MCNC: 38 MG/DL (ref 0–150)
VLDLC SERPL CALC-MCNC: 8 MG/DL
WBC # BLD: 6.1 K/UL (ref 4–11)

## 2022-01-21 PROCEDURE — 3046F HEMOGLOBIN A1C LEVEL >9.0%: CPT | Performed by: INTERNAL MEDICINE

## 2022-01-21 PROCEDURE — 1036F TOBACCO NON-USER: CPT | Performed by: INTERNAL MEDICINE

## 2022-01-21 PROCEDURE — 36415 COLL VENOUS BLD VENIPUNCTURE: CPT | Performed by: INTERNAL MEDICINE

## 2022-01-21 PROCEDURE — G8427 DOCREV CUR MEDS BY ELIG CLIN: HCPCS | Performed by: INTERNAL MEDICINE

## 2022-01-21 PROCEDURE — 2022F DILAT RTA XM EVC RTNOPTHY: CPT | Performed by: INTERNAL MEDICINE

## 2022-01-21 PROCEDURE — 99214 OFFICE O/P EST MOD 30 MIN: CPT | Performed by: INTERNAL MEDICINE

## 2022-01-21 PROCEDURE — G8419 CALC BMI OUT NRM PARAM NOF/U: HCPCS | Performed by: INTERNAL MEDICINE

## 2022-01-21 PROCEDURE — G8484 FLU IMMUNIZE NO ADMIN: HCPCS | Performed by: INTERNAL MEDICINE

## 2022-01-21 PROCEDURE — 3017F COLORECTAL CA SCREEN DOC REV: CPT | Performed by: INTERNAL MEDICINE

## 2022-01-21 RX ORDER — LISINOPRIL 2.5 MG/1
2.5 TABLET ORAL DAILY
Qty: 90 TABLET | Refills: 1 | Status: SHIPPED | OUTPATIENT
Start: 2022-01-21 | End: 2022-07-22 | Stop reason: SDUPTHER

## 2022-01-21 RX ORDER — EMPAGLIFLOZIN 10 MG/1
1 TABLET, FILM COATED ORAL DAILY
Qty: 90 TABLET | Refills: 1 | Status: SHIPPED | OUTPATIENT
Start: 2022-01-21 | End: 2022-07-22 | Stop reason: SDUPTHER

## 2022-01-21 RX ORDER — ROSUVASTATIN CALCIUM 5 MG/1
5 TABLET, COATED ORAL NIGHTLY
Qty: 90 TABLET | Refills: 1 | Status: SHIPPED | OUTPATIENT
Start: 2022-01-21 | End: 2022-07-22 | Stop reason: SDUPTHER

## 2022-01-21 NOTE — PROGRESS NOTES
Luke Prajapati  1965 01/21/22    SUBJECTIVE:    DM- ins covering jardiance and glc 120-130s at home. Lab Results   Component Value Date    LABA1C 10.0 07/27/2021    LABA1C 9.5 08/28/2019    LABA1C 8.4 12/07/2018     Lab Results   Component Value Date    LABMICR 13.90 (H) 08/28/2019    LDLCALC 55 08/28/2019    CREATININE 1.0 08/28/2019     Did have covid infection, dx'd at [facility] approx 2 weeks ago and now recovered. Asthma:  Patient denies significant chest pain/tightness, dyspnea, decreased exercise tolerance, cough, or wheezing on current regimen. OBJECTIVE:    /70   Pulse 72   Resp 14   Ht 6' 1\" (1.854 m)   Wt 209 lb (94.8 kg)   SpO2 97%   BMI 27.57 kg/m²     Physical Exam  Vitals reviewed. Constitutional:       General: He is not in acute distress. Appearance: He is well-developed. HENT:      Head: Normocephalic and atraumatic. Eyes:      General: No scleral icterus. Right eye: No discharge. Left eye: No discharge. Conjunctiva/sclera: Conjunctivae normal.      Pupils: Pupils are equal, round, and reactive to light. Neck:      Thyroid: No thyromegaly. Vascular: No JVD. Trachea: No tracheal deviation. Cardiovascular:      Rate and Rhythm: Normal rate and regular rhythm. Heart sounds: Normal heart sounds. No murmur heard. No friction rub. No gallop. Pulmonary:      Effort: Pulmonary effort is normal. No respiratory distress. Breath sounds: Normal breath sounds. No wheezing or rales. Abdominal:      General: Bowel sounds are normal. There is no distension. Palpations: Abdomen is soft. There is no mass. Tenderness: There is no abdominal tenderness. There is no guarding or rebound. Musculoskeletal:         General: No tenderness. Cervical back: Normal range of motion and neck supple. Lymphadenopathy:      Cervical: No cervical adenopathy. Skin:     General: Skin is warm and dry. Findings: No rash. EXAM    Fatty liver disease, nonalcoholic - cont low fat diet and monitoring liver fxn    Mild intermittent asthma without complication -   stable and will monitor for any exacerbations, not currently in need of any rescue inhaler therapy. Screening for malignant neoplasm of prostate  -     PSA, Prostatic Specific Antigen; Future    Preventative health care  -     Hepatitis C Antibody;  Future

## 2022-01-22 LAB
ESTIMATED AVERAGE GLUCOSE: 203 MG/DL
HBA1C MFR BLD: 8.7 %

## 2022-01-24 RX ORDER — GLIPIZIDE 5 MG/1
5 TABLET ORAL DAILY
Qty: 90 TABLET | Refills: 1 | Status: SHIPPED | OUTPATIENT
Start: 2022-01-24 | End: 2022-07-22

## 2022-01-24 NOTE — RESULT ENCOUNTER NOTE
Call pt, labs ok/chol ok, psa and urine are also nl range. His sugars are much better overall w prior a1c 10.0 now down to 8.7 but around 7 is more ideal.  Would rec w janey that we also add a low dose of glucotrol 5mg daily qam, is also very important that he take this w food/breakfast in am so to avoid any low sugar spells.   Place med changes/corrections on EPIC medlist please

## 2022-01-25 ENCOUNTER — TELEPHONE (OUTPATIENT)
Dept: ORTHOPEDIC SURGERY | Age: 57
End: 2022-01-25

## 2022-01-25 ENCOUNTER — HOSPITAL ENCOUNTER (OUTPATIENT)
Age: 57
Setting detail: SPECIMEN
Discharge: HOME | End: 2022-01-25
Payer: COMMERCIAL

## 2022-01-25 DIAGNOSIS — Z01.818 PRE-OP TESTING: Primary | ICD-10-CM

## 2022-01-25 PROCEDURE — U0005 INFEC AGEN DETEC AMPLI PROBE: HCPCS

## 2022-01-25 PROCEDURE — U0003 INFECTIOUS AGENT DETECTION BY NUCLEIC ACID (DNA OR RNA); SEVERE ACUTE RESPIRATORY SYNDROME CORONAVIRUS 2 (SARS-COV-2) (CORONAVIRUS DISEASE [COVID-19]), AMPLIFIED PROBE TECHNIQUE, MAKING USE OF HIGH THROUGHPUT TECHNOLOGIES AS DESCRIBED BY CMS-2020-01-R: HCPCS

## 2022-01-25 NOTE — TELEPHONE ENCOUNTER
I called [facility] at [phone] and spoke with Cornelio Olivier who assisted in started a pre cert for Codes:     [id number]  [id number]  [address]    Dx Code: B74.981E  Provider NPI [id number]  [address] NPI: [id number]    Clinical were faxed to [phone] as requested for review    Pending Approval # [id number]

## 2022-01-25 NOTE — TELEPHONE ENCOUNTER
Tere Moreno called with Kyle Espino on behalf of [facility] stating that surgery was approved for all 6 CPT codes requested.     Approval # [id number]

## 2022-01-26 LAB
SARS-COV-2: NOT DETECTED
SOURCE: NORMAL

## 2022-01-26 NOTE — PROGRESS NOTES
[id number]  I left a voicemail with my call back number and a request to return my call to complete phone PAT assessment. Since I am not able to reach patient, EKG orfered per protocol DOS. I did leave pre-op instructions. [id number]  I called the patients wife's number and  left a voicemail with my call back number and a request that a message be given to patient to return my call to complete the Phone PAT assessment. 1/27/22 0832 I left a 3rd voicemail requsting a call back to complete the phone PAT assessment. ADDENDUM: 1/27/22 1040  Patient answered my call and completed PAT phone assessment. Surgery 1/31/22   you will be called   1/28/22 with times. 1. Do not eat or drink anything after midnight - unless instructed by your doctor prior to surgery. This includes                   no water, chewing gum or mints. 2. Follow your directions as prescribed by the doctor for your procedure and medications. 3. Check with your Doctor regarding stopping vitamins, supplements, blood thinners (Plavix, Coumadin, Lovenox, Effient, Pradaxa, Xarelto, Fragmin or                   other blood thinners) and follow their instructions. Stop all supplements and herbals 7 days before surgery. Aspirin- hold 5 days before surgery unless instructed differently  by cardiology. Morning of surgery, do not take any medications. 4. Do not smoke, and do not drink any alcoholic beverages 24 hours prior to surgery. This includes NA Beer. 5. You may brush your teeth and gargle the morning of surgery. DO NOT SWALLOW WATER   6. You MUST make arrangements for a responsible adult to take you home after your surgery and be able to check on you every couple                   hours for the day. You will not be allowed to leave alone or drive yourself home. It is strongly suggested someone stay with you the first 24                   hrs. Your surgery will be cancelled if you do not have a ride home.    7. Please wear simple, loose fitting clothing to the hospital.  Parker Aaron not bring valuables (money, credit cards, checkbooks, etc.) Do not wear any                   makeup (including no eye makeup) or nail polish on your fingers or toes. 8. DO NOT wear any jewelry or piercings on day of surgery. All body piercing jewelry must be removed. 9. If you have dentures, they will be removed before going to the OR; we will provide you a container. If you wear contact lenses or glasses,                  they will be removed; please bring a case for them. 10. If you  have a Living Will and Durable Power of  for Healthcare, please bring in a copy. 11. Please bring picture ID,  insurance card, paperwork from the doctors office    (H & P, Consent, & card for implantable devices). 12. Take a shower the night before or morning of your procedure, do not apply any lotion, oil or powder. 13. Wear a mask covering your nose & mouth when entering the hospital. Have your covid-19 test performed within 2-7 days of your                  surgery. Quarantine yourself after the test until after your surgery. COVID test done 1/25/22= negative.

## 2022-01-28 ENCOUNTER — ANESTHESIA EVENT (OUTPATIENT)
Dept: OPERATING ROOM | Age: 57
End: 2022-01-28
Payer: COMMERCIAL

## 2022-01-28 NOTE — ANESTHESIA PRE PROCEDURE
Department of Anesthesiology  Preprocedure Note       Name:  Andre Lopez   Age:  49 y.o.  :  1965                                          MRN:  [id number]         Date:  2022      Surgeon: Chloe Yeboah):  Veronika Zabala DO    Procedure: Procedure(s):  LEFT SHOULDER ARTHROSCOPY ROTATOR CUFF REPAIR VS DEBRIDMENT, SUBACROMIAL DECOMPRESSION, BICEPS TENODESIS  LEFT CLAVICLE DISTAL EXCISION RESECTION    Medications prior to admission:   Prior to Admission medications    Medication Sig Start Date End Date Taking? Authorizing Provider   lisinopril (PRINIVIL;ZESTRIL) 2.5 MG tablet Take 1 tablet by mouth daily 22  Yes Lorren Schilder, MD   empagliflozin (JARDIANCE) 10 MG tablet Take 1 tablet by mouth daily 22  Yes Lorren Schilder, MD   rosuvastatin (CRESTOR) 5 MG tablet Take 1 tablet by mouth nightly 22  Yes Lorren Schilder, MD   glipiZIDE (GLUCOTROL) 5 MG tablet Take 1 tablet by mouth daily 22   Lorren Schilder, MD   aspirin (ASPIRIN CHILDRENS) 81 MG chewable tablet Take 1 tablet by mouth daily 21   Lorren Schilder, MD       Current medications:    No current facility-administered medications for this encounter. Current Outpatient Medications   Medication Sig Dispense Refill    lisinopril (PRINIVIL;ZESTRIL) 2.5 MG tablet Take 1 tablet by mouth daily 90 tablet 1    empagliflozin (JARDIANCE) 10 MG tablet Take 1 tablet by mouth daily 90 tablet 1    rosuvastatin (CRESTOR) 5 MG tablet Take 1 tablet by mouth nightly 90 tablet 1    glipiZIDE (GLUCOTROL) 5 MG tablet Take 1 tablet by mouth daily 90 tablet 1    aspirin (ASPIRIN CHILDRENS) 81 MG chewable tablet Take 1 tablet by mouth daily 30 tablet 3       Allergies:     Allergies   Allergen Reactions    Nsaids Other (See Comments)     Has diabetic nephropathy    Metformin And Related Diarrhea       Problem List:    Patient Active Problem List   Diagnosis Code    Hypertension I10    Fatty liver disease, nonalcoholic S30.2    Asthma J45.909    Type 2 diabetes mellitus with diabetic nephropathy, without long-term current use of insulin (HCC) E11.21    Superior glenoid labrum lesion of left shoulder S43.432A       Past Medical History:        Diagnosis Date    Abdominal pain, right lower quadrant     Asthma     Bee sting allergies     COVID-19 virus infection     jan 2022, defers vaccine    Decreased libido     Diabetes mellitus with nephropathy ([address]     Family history of coronary artery disease     Fatigue     Fatty liver disease, nonalcoholic     Hypertension     NEED EKG, CONSIDER CT CARDIAC SCORING    Screening for colorectal cancer     COLOGUARD NEG 8/2021- RECHECK DUE 3 YRS       Past Surgical History:        Procedure Laterality Date    ROTATOR CUFF REPAIR Right 2005    right     SEPTOPLASTY  1990's       Social History:    Social History     Tobacco Use    Smoking status: Former Smoker     Packs/day: 0.50     Years: 10.00     Pack years: 5.00     Types: Cigarettes    Smokeless tobacco: Former User     Types: Chew    Tobacco comment: E Cig on regular basis   Substance Use Topics    Alcohol use: Yes     Alcohol/week: 0.0 standard drinks     Comment: Occasional                                 Counseling given: Not Answered  Comment: E Cig on regular basis      Vital Signs (Current):   Vitals:    01/27/22 1044   Weight: 204 lb (92.5 kg)   Height: 6' 1\" (1.854 m)                                              BP Readings from Last 3 Encounters:   01/21/22 110/70   07/27/21 124/82   02/28/20 120/78       NPO Status:                                                                                 BMI:   Wt Readings from Last 3 Encounters:   01/21/22 209 lb (94.8 kg)   12/01/21 200 lb (90.7 kg)   09/22/21 206 lb (93.4 kg)     Body mass index is 26.91 kg/m².     CBC:   Lab Results   Component Value Date    WBC 6.1 01/21/2022    RBC 5.36 01/21/2022    HGB 16.5 01/21/2022    HCT 48.9 01/21/2022 MCV 91.2 01/21/2022    RDW 12.7 01/21/2022     01/21/2022       CMP:   Lab Results   Component Value Date     01/21/2022    K 4.6 01/21/2022     01/21/2022    CO2 26 01/21/2022    BUN 11 01/21/2022    CREATININE 0.9 01/21/2022    GFRAA >60 01/21/2022    AGRATIO 1.8 01/21/2022    LABGLOM >60 01/21/2022    LABGLOM 88 11/01/2017    GLUCOSE 174 01/21/2022    PROT 7.0 01/21/2022    CALCIUM 9.4 01/21/2022    BILITOT 0.5 01/21/2022    ALKPHOS 103 01/21/2022    AST 19 01/21/2022    ALT 26 01/21/2022       POC Tests: No results for input(s): POCGLU, POCNA, POCK, POCCL, POCBUN, POCHEMO, POCHCT in the last 72 hours. Coags: No results found for: PROTIME, INR, APTT    HCG (If Applicable): No results found for: PREGTESTUR, PREGSERUM, HCG, HCGQUANT     ABGs: No results found for: PHART, PO2ART, INE0IIO, DBW6YXA, BEART, F5LXXVIL     Type & Screen (If Applicable):  No results found for: LABABO, LABRH    Drug/Infectious Status (If Applicable):  No results found for: HIV, HEPCAB    COVID-19 Screening (If Applicable):   Lab Results   Component Value Date    COVID19 NOT DETECTED 01/25/2022           Anesthesia Evaluation  Patient summary reviewed  Airway: Mallampati: II  TM distance: >3 FB   Neck ROM: full  Mouth opening: > = 3 FB Dental:          Pulmonary:normal exam    (+) asthma:                            Cardiovascular:    (+) hypertension:,                   Neuro/Psych:   (+) psychiatric history:            GI/Hepatic/Renal:   (+) liver disease (fatty liver disease):,           Endo/Other:    (+) DiabetesType II DM, , .                  ROS comment: Superior glenoid labrum lesion of left shoulder Abdominal:             Vascular: Other Findings:           Anesthesia Plan      general and regional     ASA 2     (Covid - 19 virus infection: Jan 2022, defers vaccine)  Induction: intravenous. MIPS: Postoperative opioids intended. Anesthetic plan and risks discussed with patient.       Plan discussed with CRNA. Attending anesthesiologist reviewed and agrees with Pre Eval content         Pre Anesthesia Assessment complete.  Chart reviewed on 1/28/2022  JOB Cole - CRNA   1/28/2022

## 2022-01-28 NOTE — PROGRESS NOTES
I confirmed with patient that surgery is on 1/31/22 at [facility] at 0730 with a 0600 arrival time.

## 2022-01-31 ENCOUNTER — ANESTHESIA (OUTPATIENT)
Dept: OPERATING ROOM | Age: 57
End: 2022-01-31
Payer: COMMERCIAL

## 2022-01-31 ENCOUNTER — HOSPITAL ENCOUNTER (OUTPATIENT)
Age: 57
Setting detail: OUTPATIENT SURGERY
Discharge: HOME | End: 2022-01-31
Attending: STUDENT IN AN ORGANIZED HEALTH CARE EDUCATION/TRAINING PROGRAM | Admitting: STUDENT IN AN ORGANIZED HEALTH CARE EDUCATION/TRAINING PROGRAM
Payer: COMMERCIAL

## 2022-01-31 VITALS
TEMPERATURE: 96.2 F | WEIGHT: 204 LBS | BODY MASS INDEX: 27.04 KG/M2 | DIASTOLIC BLOOD PRESSURE: 74 MMHG | SYSTOLIC BLOOD PRESSURE: 117 MMHG | RESPIRATION RATE: 18 BRPM | HEIGHT: 73 IN | HEART RATE: 82 BPM | OXYGEN SATURATION: 97 %

## 2022-01-31 VITALS
DIASTOLIC BLOOD PRESSURE: 80 MMHG | OXYGEN SATURATION: 92 % | TEMPERATURE: 96.8 F | SYSTOLIC BLOOD PRESSURE: 97 MMHG | RESPIRATION RATE: 5 BRPM

## 2022-01-31 DIAGNOSIS — S43.432A TYPE I SLAP LESION, LEFT, INITIAL ENCOUNTER: ICD-10-CM

## 2022-01-31 DIAGNOSIS — Z01.818 ENCOUNTER FOR PREADMISSION TESTING: Primary | ICD-10-CM

## 2022-01-31 LAB
EKG ATRIAL RATE: 60 BPM
EKG DIAGNOSIS: NORMAL
EKG P AXIS: 9 DEGREES
EKG P-R INTERVAL: 166 MS
EKG Q-T INTERVAL: 414 MS
EKG QRS DURATION: 102 MS
EKG QTC CALCULATION (BAZETT): 414 MS
EKG R AXIS: -10 DEGREES
EKG T AXIS: 15 DEGREES
EKG VENTRICULAR RATE: 60 BPM
GLUCOSE BLD-MCNC: 125 MG/DL (ref 70–99)
GLUCOSE BLD-MCNC: 163 MG/DL (ref 70–99)

## 2022-01-31 PROCEDURE — 7100000001 HC PACU RECOVERY - ADDTL 15 MIN: Performed by: STUDENT IN AN ORGANIZED HEALTH CARE EDUCATION/TRAINING PROGRAM

## 2022-01-31 PROCEDURE — 29826 SHO ARTHRS SRG DECOMPRESSION: CPT | Performed by: STUDENT IN AN ORGANIZED HEALTH CARE EDUCATION/TRAINING PROGRAM

## 2022-01-31 PROCEDURE — 29825 SHO ARTHRS SRG LSS&RESCJ ADS: CPT | Performed by: STUDENT IN AN ORGANIZED HEALTH CARE EDUCATION/TRAINING PROGRAM

## 2022-01-31 PROCEDURE — 64415 NJX AA&/STRD BRCH PLXS IMG: CPT | Performed by: ANESTHESIOLOGY

## 2022-01-31 PROCEDURE — 2580000003 HC RX 258: Performed by: ANESTHESIOLOGY

## 2022-01-31 PROCEDURE — L3650 SO 8 ABD RESTRAINT PRE OTS: HCPCS | Performed by: STUDENT IN AN ORGANIZED HEALTH CARE EDUCATION/TRAINING PROGRAM

## 2022-01-31 PROCEDURE — 6360000002 HC RX W HCPCS: Performed by: ANESTHESIOLOGY

## 2022-01-31 PROCEDURE — 93005 ELECTROCARDIOGRAM TRACING: CPT | Performed by: ANESTHESIOLOGY

## 2022-01-31 PROCEDURE — 93010 ELECTROCARDIOGRAM REPORT: CPT | Performed by: INTERNAL MEDICINE

## 2022-01-31 PROCEDURE — 29822 SHO ARTHRS SRG LMTD DBRDMT: CPT | Performed by: STUDENT IN AN ORGANIZED HEALTH CARE EDUCATION/TRAINING PROGRAM

## 2022-01-31 PROCEDURE — 6360000002 HC RX W HCPCS: Performed by: NURSE ANESTHETIST, CERTIFIED REGISTERED

## 2022-01-31 PROCEDURE — 7100000011 HC PHASE II RECOVERY - ADDTL 15 MIN: Performed by: STUDENT IN AN ORGANIZED HEALTH CARE EDUCATION/TRAINING PROGRAM

## 2022-01-31 PROCEDURE — 7100000010 HC PHASE II RECOVERY - FIRST 15 MIN: Performed by: STUDENT IN AN ORGANIZED HEALTH CARE EDUCATION/TRAINING PROGRAM

## 2022-01-31 PROCEDURE — 3600000004 HC SURGERY LEVEL 4 BASE: Performed by: STUDENT IN AN ORGANIZED HEALTH CARE EDUCATION/TRAINING PROGRAM

## 2022-01-31 PROCEDURE — 29824 SHO ARTHRS SRG DSTL CLAVICLC: CPT | Performed by: STUDENT IN AN ORGANIZED HEALTH CARE EDUCATION/TRAINING PROGRAM

## 2022-01-31 PROCEDURE — 2709999900 HC NON-CHARGEABLE SUPPLY: Performed by: STUDENT IN AN ORGANIZED HEALTH CARE EDUCATION/TRAINING PROGRAM

## 2022-01-31 PROCEDURE — 3700000000 HC ANESTHESIA ATTENDED CARE: Performed by: STUDENT IN AN ORGANIZED HEALTH CARE EDUCATION/TRAINING PROGRAM

## 2022-01-31 PROCEDURE — 76942 ECHO GUIDE FOR BIOPSY: CPT | Performed by: NURSE ANESTHETIST, CERTIFIED REGISTERED

## 2022-01-31 PROCEDURE — 3600000014 HC SURGERY LEVEL 4 ADDTL 15MIN: Performed by: STUDENT IN AN ORGANIZED HEALTH CARE EDUCATION/TRAINING PROGRAM

## 2022-01-31 PROCEDURE — 82962 GLUCOSE BLOOD TEST: CPT

## 2022-01-31 PROCEDURE — C1713 ANCHOR/SCREW BN/BN,TIS/BN: HCPCS | Performed by: STUDENT IN AN ORGANIZED HEALTH CARE EDUCATION/TRAINING PROGRAM

## 2022-01-31 PROCEDURE — 29828 SHO ARTHRS SRG BICP TENODSIS: CPT | Performed by: STUDENT IN AN ORGANIZED HEALTH CARE EDUCATION/TRAINING PROGRAM

## 2022-01-31 PROCEDURE — 7100000000 HC PACU RECOVERY - FIRST 15 MIN: Performed by: STUDENT IN AN ORGANIZED HEALTH CARE EDUCATION/TRAINING PROGRAM

## 2022-01-31 PROCEDURE — 3700000001 HC ADD 15 MINUTES (ANESTHESIA): Performed by: STUDENT IN AN ORGANIZED HEALTH CARE EDUCATION/TRAINING PROGRAM

## 2022-01-31 PROCEDURE — 2500000003 HC RX 250 WO HCPCS: Performed by: NURSE ANESTHETIST, CERTIFIED REGISTERED

## 2022-01-31 PROCEDURE — 6360000002 HC RX W HCPCS: Performed by: STUDENT IN AN ORGANIZED HEALTH CARE EDUCATION/TRAINING PROGRAM

## 2022-01-31 DEVICE — ANCHOR SUTURE BIOCOMP 4.75X19.1 MM SWIVELOCK C: Type: IMPLANTABLE DEVICE | Status: FUNCTIONAL

## 2022-01-31 RX ORDER — CYCLOBENZAPRINE HCL 5 MG
5 TABLET ORAL 2 TIMES DAILY PRN
Qty: 20 TABLET | Refills: 0 | Status: SHIPPED | OUTPATIENT
Start: 2022-01-31 | End: 2022-02-10

## 2022-01-31 RX ORDER — OXYCODONE HYDROCHLORIDE AND ACETAMINOPHEN 5; 325 MG/1; MG/1
1 TABLET ORAL EVERY 6 HOURS PRN
Qty: 28 TABLET | Refills: 0 | Status: SHIPPED | OUTPATIENT
Start: 2022-01-31 | End: 2022-02-07

## 2022-01-31 RX ORDER — ROPIVACAINE HYDROCHLORIDE 5 MG/ML
INJECTION, SOLUTION EPIDURAL; INFILTRATION; PERINEURAL
Status: COMPLETED | OUTPATIENT
Start: 2022-01-31 | End: 2022-01-31

## 2022-01-31 RX ORDER — DIPHENHYDRAMINE HYDROCHLORIDE 50 MG/ML
12.5 INJECTION INTRAMUSCULAR; INTRAVENOUS
Status: DISCONTINUED | OUTPATIENT
Start: 2022-01-31 | End: 2022-01-31 | Stop reason: HOSPADM

## 2022-01-31 RX ORDER — PROMETHAZINE HYDROCHLORIDE 25 MG/ML
6.25 INJECTION, SOLUTION INTRAMUSCULAR; INTRAVENOUS
Status: DISCONTINUED | OUTPATIENT
Start: 2022-01-31 | End: 2022-01-31 | Stop reason: HOSPADM

## 2022-01-31 RX ORDER — SODIUM CHLORIDE, SODIUM LACTATE, POTASSIUM CHLORIDE, CALCIUM CHLORIDE 600; 310; 30; 20 MG/100ML; MG/100ML; MG/100ML; MG/100ML
INJECTION, SOLUTION INTRAVENOUS CONTINUOUS
Status: CANCELLED | OUTPATIENT
Start: 2022-01-31

## 2022-01-31 RX ORDER — ONDANSETRON 2 MG/ML
4 INJECTION INTRAMUSCULAR; INTRAVENOUS
Status: DISCONTINUED | OUTPATIENT
Start: 2022-01-31 | End: 2022-01-31 | Stop reason: HOSPADM

## 2022-01-31 RX ORDER — HYDROCODONE BITARTRATE AND ACETAMINOPHEN 5; 325 MG/1; MG/1
2 TABLET ORAL EVERY 6 HOURS PRN
Status: DISCONTINUED | OUTPATIENT
Start: 2022-01-31 | End: 2022-01-31 | Stop reason: HOSPADM

## 2022-01-31 RX ORDER — ONDANSETRON 2 MG/ML
INJECTION INTRAMUSCULAR; INTRAVENOUS PRN
Status: DISCONTINUED | OUTPATIENT
Start: 2022-01-31 | End: 2022-01-31 | Stop reason: SDUPTHER

## 2022-01-31 RX ORDER — SODIUM CHLORIDE 0.9 % (FLUSH) 0.9 %
10 SYRINGE (ML) INJECTION PRN
Status: CANCELLED | OUTPATIENT
Start: 2022-01-31

## 2022-01-31 RX ORDER — ONDANSETRON 4 MG/1
4 TABLET, ORALLY DISINTEGRATING ORAL EVERY 8 HOURS PRN
Status: CANCELLED | OUTPATIENT
Start: 2022-01-31

## 2022-01-31 RX ORDER — 0.9 % SODIUM CHLORIDE 0.9 %
500 INTRAVENOUS SOLUTION INTRAVENOUS
Status: DISCONTINUED | OUTPATIENT
Start: 2022-01-31 | End: 2022-01-31 | Stop reason: HOSPADM

## 2022-01-31 RX ORDER — LIDOCAINE HYDROCHLORIDE 20 MG/ML
INJECTION, SOLUTION EPIDURAL; INFILTRATION; INTRACAUDAL; PERINEURAL PRN
Status: DISCONTINUED | OUTPATIENT
Start: 2022-01-31 | End: 2022-01-31 | Stop reason: SDUPTHER

## 2022-01-31 RX ORDER — HYDROMORPHONE HCL 110MG/55ML
0.5 PATIENT CONTROLLED ANALGESIA SYRINGE INTRAVENOUS EVERY 5 MIN PRN
Status: DISCONTINUED | OUTPATIENT
Start: 2022-01-31 | End: 2022-01-31 | Stop reason: HOSPADM

## 2022-01-31 RX ORDER — ACETAMINOPHEN 500 MG
1000 TABLET ORAL ONCE
Status: CANCELLED | OUTPATIENT
Start: 2022-01-31 | End: 2022-01-31

## 2022-01-31 RX ORDER — FENTANYL CITRATE 50 UG/ML
50 INJECTION, SOLUTION INTRAMUSCULAR; INTRAVENOUS EVERY 5 MIN PRN
Status: DISCONTINUED | OUTPATIENT
Start: 2022-01-31 | End: 2022-01-31 | Stop reason: HOSPADM

## 2022-01-31 RX ORDER — SODIUM CHLORIDE 0.9 % (FLUSH) 0.9 %
10 SYRINGE (ML) INJECTION EVERY 12 HOURS SCHEDULED
Status: CANCELLED | OUTPATIENT
Start: 2022-01-31

## 2022-01-31 RX ORDER — FENTANYL CITRATE 50 UG/ML
INJECTION, SOLUTION INTRAMUSCULAR; INTRAVENOUS PRN
Status: DISCONTINUED | OUTPATIENT
Start: 2022-01-31 | End: 2022-01-31 | Stop reason: SDUPTHER

## 2022-01-31 RX ORDER — CEFAZOLIN SODIUM 2 G/100ML
2000 INJECTION, SOLUTION INTRAVENOUS ONCE
Status: COMPLETED | OUTPATIENT
Start: 2022-01-31 | End: 2022-01-31

## 2022-01-31 RX ORDER — SODIUM CHLORIDE 9 MG/ML
25 INJECTION, SOLUTION INTRAVENOUS PRN
Status: CANCELLED | OUTPATIENT
Start: 2022-01-31

## 2022-01-31 RX ORDER — PROPOFOL 10 MG/ML
INJECTION, EMULSION INTRAVENOUS PRN
Status: DISCONTINUED | OUTPATIENT
Start: 2022-01-31 | End: 2022-01-31 | Stop reason: SDUPTHER

## 2022-01-31 RX ORDER — DEXAMETHASONE SODIUM PHOSPHATE 4 MG/ML
INJECTION, SOLUTION INTRA-ARTICULAR; INTRALESIONAL; INTRAMUSCULAR; INTRAVENOUS; SOFT TISSUE PRN
Status: DISCONTINUED | OUTPATIENT
Start: 2022-01-31 | End: 2022-01-31 | Stop reason: SDUPTHER

## 2022-01-31 RX ORDER — CEFAZOLIN SODIUM 2 G/100ML
2000 INJECTION, SOLUTION INTRAVENOUS EVERY 8 HOURS
Status: CANCELLED | OUTPATIENT
Start: 2022-01-31 | End: 2022-02-01

## 2022-01-31 RX ORDER — LABETALOL HYDROCHLORIDE 5 MG/ML
5 INJECTION, SOLUTION INTRAVENOUS EVERY 10 MIN PRN
Status: DISCONTINUED | OUTPATIENT
Start: 2022-01-31 | End: 2022-01-31 | Stop reason: HOSPADM

## 2022-01-31 RX ORDER — MEPERIDINE HYDROCHLORIDE 25 MG/ML
12.5 INJECTION INTRAMUSCULAR; INTRAVENOUS; SUBCUTANEOUS EVERY 5 MIN PRN
Status: DISCONTINUED | OUTPATIENT
Start: 2022-01-31 | End: 2022-01-31 | Stop reason: HOSPADM

## 2022-01-31 RX ORDER — HYDROCODONE BITARTRATE AND ACETAMINOPHEN 5; 325 MG/1; MG/1
1 TABLET ORAL PRN
Status: DISCONTINUED | OUTPATIENT
Start: 2022-01-31 | End: 2022-01-31 | Stop reason: HOSPADM

## 2022-01-31 RX ORDER — OXYCODONE HYDROCHLORIDE 5 MG/1
5 TABLET ORAL EVERY 4 HOURS PRN
Status: CANCELLED | OUTPATIENT
Start: 2022-01-31

## 2022-01-31 RX ORDER — SODIUM CHLORIDE, SODIUM LACTATE, POTASSIUM CHLORIDE, CALCIUM CHLORIDE 600; 310; 30; 20 MG/100ML; MG/100ML; MG/100ML; MG/100ML
INJECTION, SOLUTION INTRAVENOUS CONTINUOUS
Status: DISCONTINUED | OUTPATIENT
Start: 2022-01-31 | End: 2022-01-31 | Stop reason: HOSPADM

## 2022-01-31 RX ORDER — PHENYLEPHRINE HYDROCHLORIDE 10 MG/ML
INJECTION INTRAVENOUS PRN
Status: DISCONTINUED | OUTPATIENT
Start: 2022-01-31 | End: 2022-01-31 | Stop reason: SDUPTHER

## 2022-01-31 RX ORDER — HYDRALAZINE HYDROCHLORIDE 20 MG/ML
5 INJECTION INTRAMUSCULAR; INTRAVENOUS EVERY 10 MIN PRN
Status: DISCONTINUED | OUTPATIENT
Start: 2022-01-31 | End: 2022-01-31 | Stop reason: HOSPADM

## 2022-01-31 RX ORDER — ROCURONIUM BROMIDE 10 MG/ML
INJECTION, SOLUTION INTRAVENOUS PRN
Status: DISCONTINUED | OUTPATIENT
Start: 2022-01-31 | End: 2022-01-31 | Stop reason: SDUPTHER

## 2022-01-31 RX ORDER — ONDANSETRON 2 MG/ML
4 INJECTION INTRAMUSCULAR; INTRAVENOUS EVERY 6 HOURS PRN
Status: CANCELLED | OUTPATIENT
Start: 2022-01-31

## 2022-01-31 RX ORDER — OXYCODONE HYDROCHLORIDE 5 MG/1
10 TABLET ORAL EVERY 4 HOURS PRN
Status: CANCELLED | OUTPATIENT
Start: 2022-01-31

## 2022-01-31 RX ORDER — ONDANSETRON 4 MG/1
4 TABLET, FILM COATED ORAL 3 TIMES DAILY PRN
Qty: 15 TABLET | Refills: 0 | Status: SHIPPED | OUTPATIENT
Start: 2022-01-31 | End: 2022-07-22

## 2022-01-31 RX ADMIN — FENTANYL CITRATE 50 MCG: 50 INJECTION, SOLUTION INTRAMUSCULAR; INTRAVENOUS at 11:47

## 2022-01-31 RX ADMIN — ROCURONIUM BROMIDE 10 MG: 10 INJECTION INTRAVENOUS at 09:32

## 2022-01-31 RX ADMIN — ROCURONIUM BROMIDE 10 MG: 10 INJECTION INTRAVENOUS at 11:18

## 2022-01-31 RX ADMIN — ROCURONIUM BROMIDE 20 MG: 10 INJECTION INTRAVENOUS at 10:15

## 2022-01-31 RX ADMIN — SUGAMMADEX 200 MG: 100 INJECTION, SOLUTION INTRAVENOUS at 11:41

## 2022-01-31 RX ADMIN — PHENYLEPHRINE HYDROCHLORIDE 200 MCG: 10 INJECTION INTRAVENOUS at 10:03

## 2022-01-31 RX ADMIN — SODIUM CHLORIDE, POTASSIUM CHLORIDE, SODIUM LACTATE AND CALCIUM CHLORIDE: 600; 310; 30; 20 INJECTION, SOLUTION INTRAVENOUS at 06:43

## 2022-01-31 RX ADMIN — SODIUM CHLORIDE, POTASSIUM CHLORIDE, SODIUM LACTATE AND CALCIUM CHLORIDE: 600; 310; 30; 20 INJECTION, SOLUTION INTRAVENOUS at 11:20

## 2022-01-31 RX ADMIN — LIDOCAINE HYDROCHLORIDE 100 MG: 20 INJECTION, SOLUTION EPIDURAL; INFILTRATION; INTRACAUDAL at 09:28

## 2022-01-31 RX ADMIN — PHENYLEPHRINE HYDROCHLORIDE 200 MCG: 10 INJECTION INTRAVENOUS at 09:57

## 2022-01-31 RX ADMIN — ROCURONIUM BROMIDE 40 MG: 10 INJECTION INTRAVENOUS at 09:29

## 2022-01-31 RX ADMIN — DEXAMETHASONE SODIUM PHOSPHATE 8 MG: 4 INJECTION, SOLUTION INTRAMUSCULAR; INTRAVENOUS at 09:41

## 2022-01-31 RX ADMIN — FENTANYL CITRATE 100 MCG: 50 INJECTION, SOLUTION INTRAMUSCULAR; INTRAVENOUS at 09:25

## 2022-01-31 RX ADMIN — CEFAZOLIN SODIUM 2000 MG: 2 INJECTION, SOLUTION INTRAVENOUS at 09:34

## 2022-01-31 RX ADMIN — PROPOFOL 50 MG: 10 INJECTION, EMULSION INTRAVENOUS at 11:05

## 2022-01-31 RX ADMIN — ONDANSETRON 4 MG: 2 INJECTION INTRAMUSCULAR; INTRAVENOUS at 11:34

## 2022-01-31 RX ADMIN — ROPIVACAINE HYDROCHLORIDE 20 ML: 5 INJECTION, SOLUTION EPIDURAL; INFILTRATION; PERINEURAL at 08:33

## 2022-01-31 RX ADMIN — PROPOFOL 200 MG: 10 INJECTION, EMULSION INTRAVENOUS at 09:28

## 2022-01-31 RX ADMIN — PROPOFOL 100 MG: 10 INJECTION, EMULSION INTRAVENOUS at 09:37

## 2022-01-31 RX ADMIN — PHENYLEPHRINE HYDROCHLORIDE 200 MCG: 10 INJECTION INTRAVENOUS at 09:55

## 2022-01-31 ASSESSMENT — PULMONARY FUNCTION TESTS
PIF_VALUE: 19
PIF_VALUE: 20
PIF_VALUE: 19
PIF_VALUE: 20
PIF_VALUE: 18
PIF_VALUE: 20
PIF_VALUE: 19
PIF_VALUE: 20
PIF_VALUE: 18
PIF_VALUE: 22
PIF_VALUE: 20
PIF_VALUE: 7
PIF_VALUE: 22
PIF_VALUE: 12
PIF_VALUE: 20
PIF_VALUE: 20
PIF_VALUE: 19
PIF_VALUE: 20
PIF_VALUE: 19
PIF_VALUE: 20
PIF_VALUE: 22
PIF_VALUE: 20
PIF_VALUE: 15
PIF_VALUE: 22
PIF_VALUE: 20
PIF_VALUE: 15
PIF_VALUE: 21
PIF_VALUE: 22
PIF_VALUE: 17
PIF_VALUE: 22
PIF_VALUE: 22
PIF_VALUE: 0
PIF_VALUE: 0
PIF_VALUE: 20
PIF_VALUE: 0
PIF_VALUE: 20
PIF_VALUE: 21
PIF_VALUE: 20
PIF_VALUE: 24
PIF_VALUE: 20
PIF_VALUE: 20
PIF_VALUE: 11
PIF_VALUE: 20
PIF_VALUE: 3
PIF_VALUE: 22
PIF_VALUE: 22
PIF_VALUE: 1
PIF_VALUE: 18
PIF_VALUE: 23
PIF_VALUE: 20
PIF_VALUE: 2
PIF_VALUE: 21
PIF_VALUE: 20
PIF_VALUE: 1
PIF_VALUE: 21
PIF_VALUE: 19
PIF_VALUE: 2
PIF_VALUE: 22
PIF_VALUE: 20
PIF_VALUE: 22
PIF_VALUE: 1
PIF_VALUE: 19
PIF_VALUE: 20
PIF_VALUE: 20
PIF_VALUE: 21
PIF_VALUE: 20
PIF_VALUE: 20
PIF_VALUE: 3
PIF_VALUE: 20
PIF_VALUE: 22
PIF_VALUE: 15
PIF_VALUE: 19
PIF_VALUE: 19
PIF_VALUE: 20
PIF_VALUE: 22
PIF_VALUE: 22
PIF_VALUE: 21
PIF_VALUE: 15
PIF_VALUE: 20
PIF_VALUE: 15
PIF_VALUE: 20
PIF_VALUE: 22
PIF_VALUE: 22
PIF_VALUE: 15
PIF_VALUE: 20
PIF_VALUE: 20
PIF_VALUE: 35
PIF_VALUE: 20
PIF_VALUE: 18
PIF_VALUE: 21
PIF_VALUE: 17
PIF_VALUE: 20
PIF_VALUE: 15
PIF_VALUE: 4
PIF_VALUE: 23
PIF_VALUE: 19
PIF_VALUE: 20
PIF_VALUE: 19
PIF_VALUE: 18
PIF_VALUE: 20
PIF_VALUE: 1
PIF_VALUE: 20
PIF_VALUE: 15
PIF_VALUE: 20
PIF_VALUE: 1
PIF_VALUE: 20
PIF_VALUE: 15
PIF_VALUE: 20
PIF_VALUE: 20
PIF_VALUE: 33
PIF_VALUE: 19
PIF_VALUE: 20
PIF_VALUE: 20
PIF_VALUE: 21
PIF_VALUE: 22
PIF_VALUE: 20
PIF_VALUE: 24
PIF_VALUE: 20
PIF_VALUE: 19
PIF_VALUE: 20
PIF_VALUE: 8
PIF_VALUE: 21
PIF_VALUE: 22
PIF_VALUE: 17
PIF_VALUE: 19

## 2022-01-31 ASSESSMENT — PAIN SCALES - GENERAL
PAINLEVEL_OUTOF10: 0

## 2022-01-31 ASSESSMENT — ENCOUNTER SYMPTOMS
SHORTNESS OF BREATH: 0
COLOR CHANGE: 0
WHEEZING: 0
SORE THROAT: 0
VOMITING: 0
BACK PAIN: 0
NAUSEA: 0
COUGH: 0
DIARRHEA: 0

## 2022-01-31 NOTE — OP NOTE
Operative Note      Patient: Brielle Salas  YOB: 1965  MRN: [id number]    Date of Procedure: 1/31/2022    Pre-Op Diagnosis: Type I SLAP lesion, left, initial encounter [S43.432A]    Post-Op Diagnosis: SLAP lesion of left shoulder with adhesive capsulitis, subacromial impingement, acromioclavicular osteoarthritis      Procedure(s):  LEFT SHOULDER ARTHROSCOPY ROTATOR CUFF DEBRIDMENT VERSUS REPAIR, SUBACROMIAL DECOMPRESSION, DISTAL CLAVICLE RESECTION, BICEPS TENODESIS    Surgeon(s):  Gokul Delgado DO    Assistant:   * No surgical staff found *    Anesthesia: General    Estimated Blood Loss (mL): Minimal    Complications: None    Specimens:   * No specimens in log *    Implants:  Implant Name Type Inv. Item Serial No.  Lot No. LRB No. Used Action   ANCHOR SUTURE BIOCOMP 4.75X19.1 MM SWIVELOCK C  ANCHOR SUTURE BIOCOMP 4.75X19.1 MM [employer]IVELOCK C  [employer]  Left 1 Implanted         Drains: * No LDAs found *    Findings: Please see dictated op report    Detailed Description of Procedure:     1. Left shoulder Arthroscopic rotator cuff debridement        2. Left shoulder Arthroscopic distal clavicle excision         3. Left shoulder Arthroscopic subacromial decompression and bursectomy         4. Left shoulder arthroscopic biceps tenotomy tenodesis         5. Left shoulder Arthroscopic labral debridement         6.   Left shoulder Arthroscopic lysis of adhesions          FINDINGS:    Muscle atrophy: None    Biceps tendon status: Biceps intact without tenosynovitis with associated SLAP tear      Rotator cuff status:   Subscapularis: Intact  Supraspinatus: Intact  Infraspinatus: Intact        ROTATOR CUFF TEAR CHARACTERIZATION:     No rotator cuff tear noted along the anterior, superior or posterior region of the shoulder  No atrophic muscle or fatty degeneration of muscle seen        ARTICULAR CARTILAGE LESION(S):  No articular cartilage lesion seen on the humerus or glenoid EXAMINATION UNDER ANESTHESIA:   Adhesive capsulitis palpated upon passive range of motion     Forward Flexion 170 degrees     ER side 60 degrees     Abduction 90 degrees     ER in Abduction 70 degrees     IR in Abduction 40 degrees        Indications For Operative Procedure: Patient is a 56y. o. with persistent Left  shoulder pain and failure of conservative therapy. Patient states that he has had progressive left shoulder pain for approximately 9 months. He has no specific injury but states that he noticed he was having difficulty using his arm at work due to significant pain especially over the biceps tendon. He has noticed that he has had worsening range of motion due to the pain. He has had no prior left shoulder injury but does admit to right shoulder previous rotator cuff repair several years ago. He has no other left shoulder complaints this time. Preoperative MRI revealed a tear of the superior and posterior superior labrum with paralabral cyst, mild tendinosis of the supraspinatus tendon as well as mild degenerative changes at the [facility] joint. There is no significant biceps tendinitis or tearing. The patient had no significant clinical   evidence of weakness but did have significant pain with overhead maneuvers and positions with the biceps on stretch. He also had mild tenderness to the biceps groove as well as at the [facility] joint. Patient failed conservative treatments and at that point we discussed operative intervention which would mainly consist of a biceps tenodesis. We discussed both open and arthroscopic variations of this procedure and I explained that I would base this off of the quality of the biceps tendon upon direct visualization once I was in the joint. All questions were answered and patient agreed to undergo the procedure.       IMPLANTS UTILIZED:      [place] arthroscopy equipment, [employer], arthroscopic wand, Arthrex arthroscopy biceps Tenodesis Set, sling, Spyder arm adam, Arthrex 4.75 swivel lock anchor     DESCRIPTION OF PROCEDURE:      The patient was brought into the Operating Room, placed in supine position. Following application of the interscalene block in the preoperative hold area, the patient underwent general anesthesia and airway stabilization in the standard fashion. The patient was given the appropriate dose of antibiotics (Ancef) based on body weight. Both lower extremities were placed in comfortable position. Nonoperative arm was carefully placed into a well padded arm adam with appropriate position and no pressure on the nerves. The operative arm was then examined under anesthesia revealing the findings as noted in the findings section of this dictation. The operative shoulder was then prepped and draped in a sterile fashion with chloraprep traction was applied. Time-out was utilized to verify left side as the operative site. An #11 blade was used to make the posterior portal. Blunt trocar tip was inserted into the glenohumeral joint and area of concern was directly visualized demonstrating significant labral degeneration and SLAP tear in the superior and posterior superior portions which did correlate with MRI findings. There was no appreciable articular lesion on the humeral or glenoid side of the joint. The undersurface of the rotator cuff was evaluated and there was minimal fraying on the undersurface. There was no significant biceps tendinitis or tearing and this was done both in its natural position as well as with tension utilizing the probe and repositioning the arm. I then used a spinal needle to establish the anterior portal. The hypertrophic synovitic tissue was removed with an arthroscopic shaver. Labral tearing was noted superiorly and anteriorly and was debrided with shaver and vapor probe.   Due to the patient's minimal biceps groove pain on preoperative exam as well as healthy appearing biceps tissue, I made the decision to proceed with an The excess suture was cut without complication. The excess biceps tendon as well as the previous biceps insertion at the labrum was debrided with the arthroscopic shaver and final images of the tenodesis were obtained. Additional labral debridement was performed using the arthroscopic shaver and vapor wand. There was no excessive instability of the labrum requiring any additional treatment. We then turned our attention to the subacromial space. Blunt trocar tip and sheath was removed from the glenohumeral joint space in the posterior aspect of the subacromial space. We placed the arthroscope into the subacromial region and then used this arthroscope to visualize localization of the lateral portal with a spinal needle. A #11 blade was used to make this portal. We then debrided the subacromial space of adhesions and bursal inflammation that were present with a shaver and electrocautery device. The previous Arthrex purple cannula was then placed in the subacromial region through the anterior portal.  I then carefully debrided back the inflamed bursal tissues to fully examine the rotator cuff tendon which demonstrated no concerning tear. This was evaluated through range of motion of the shoulder. The lateral, anterior and medial borders of the undersurface of the acromion were identified and cleared of soft tissue and adhesions before proceeding with the bur. The coracoacromial ligament was identified and disected free of the anterior acromion with the electrocautery device. The acromion was then identified and marked with the bur in the lateral portal. Anatomic morphology dictated that we perform a subacromial decompression utilizing a 5.5 mm bur in a lateral to medial fashion removing the anterior spur. The anterolateral edge of the acromion was removed with a high-speed bur. . This was performed without significant difficulty.  The CA ligament was partially released off the anterior and lateral portion of the acromion during acromioplasty. I then turned my attention to the [facility] [place] joint. Soft tissue below the joint was removed with the arthroscopic shaver and vapor wand. The remaining portion of the [facility] [place] region was treated with 1 cm burring  off of the distal clavicular edge along with the associated acromial edge, leaving the superior and posterior ligaments intact. Arthroscopic pictures of both the subacromial decompression as well as the distal clavicle resection were obtained and saved for documentation. The portals were closed with a 3-0 nylon suture. We then applied Xeroform gauze, ABD pads and Medipore tape to the wound sites. The patient's arm was placed in simple sling. The patient was allowed to recover from the anesthetic, was extubated and was taken to Recovery in stable condition. At the completion of case, all instrument and sponge counts were correct. I was able to speak to the patient and their family immediately after the case. Patient was given prescriptions for muscle relaxer, Percocet for pain control as well as Zofran for nausea. The patient was doing very well without any issues or complaints. I explained to them the nature of what we performed during the case and all questions were answered. I also discussed with both the patient and their wife operative extremity restrictions going forward. Physical therapy: We will set up physical therapy at patient's 2-week follow-up visit. He will remain in simple sling throughout that time and no active range of motion at this time. Due to no rotator cuff repair, we can be more aggressive with his rehab and allow for early range of motion which I will discussed with him in office.      Discharge summary:  Discharge medications per homegoing instructions provided  Discharge home when stable  Follow-up as scheduled  Activity as above  Condition Stable      Electronically signed by Roro Borja DO on 1/31/2022 at 11:53 AM

## 2022-01-31 NOTE — ANESTHESIA PROCEDURE NOTES
Peripheral Block    Patient location during procedure: procedure area  Start time: 1/31/2022 7:15 AM  End time: 1/31/2022 7:30 AM  Staffing  Performed: resident/CRNA   Anesthesiologist: Connor Baugh MD  Resident/CRNA: JOB Monterroso CRNA  Preanesthetic Checklist  Completed: patient identified, IV checked, site marked, risks and benefits discussed, surgical consent, monitors and equipment checked, pre-op evaluation, timeout performed, anesthesia consent given, oxygen available and patient being monitored  Peripheral Block  Patient position: supine  Prep: ChloraPrep  Patient monitoring: cardiac monitor, continuous pulse ox, continuous capnometry and IV access  Block type: Brachial plexus  Laterality: left  Injection technique: single-shot  Guidance: ultrasound guided  Local infiltration: lidocaine  Infiltration strength: 2 %  Dose: 1 mL  Interscalene  Provider prep: mask and sterile gloves  Local infiltration: lidocaine  Needle  Needle type: combined needle/nerve stimulator   Needle gauge: 22 G  Needle length: 10 cm  Needle localization: paresthesias and ultrasound guidance  Assessment  Injection assessment: negative aspiration for heme, no paresthesia on injection and local visualized surrounding nerve on ultrasound  Paresthesia pain: none  Slow fractionated injection: yes  Hemodynamics: stable  Medications Administered  Ropivacaine (NAROPIN) 0.5% injection, 20 mL  Reason for block: post-op pain management and at surgeon's request

## 2022-01-31 NOTE — H&P
1/25/2022   No chief complaint on file. Updated HPI: Patient is here to undergo left shoulder procedure as previously discussed. He continues with pain at the biceps tendon as well as into the shoulder joint and AC joint. He has no new injuries or complaints since last being seen. He has no new questions about today's planned procedure. He denies any constitutional symptoms today. He remains Covid negative. Previous HPI (12/1/2021): Patient is here for reevaluation of his left shoulder. Patient completed physical therapy and states he had improvement in his length and range of motion but continues with pain with the biceps. No new issues at this time. Previous HPI (9/22/2021)               Malinda Haskins is a 49 y.o. male right handed patient referred by DARYL Gold for evaluation and treatment left shoulder pain. He is here today to review MRI of the left shoulder and discuss treatment options. He has had no treatment to the left shoulder to this point including a type of injection or therapy. Patient works as a technician and labor. Patient states that he has had progressive left shoulder pain for approximately 6 months. He has no specific injury but states that he noticed he was having difficulty using his arm at work due to significant pain especially over the biceps tendon. He has noticed that he has had worsening range of motion due to the pain. He has had no prior left shoulder injury but does admit to right shoulder previous rotator cuff repair several years ago. He has no other left shoulder complaints this time. The pain occurs every day and when active. Location of pain is anterior shoulder biceps. No history of dislocation. Symptoms are aggravated by ADL's, repetitive use, work at or above shoulder height, reaching behind him, difficulty sleeping on affected side. Symptoms are diminished by rest, avoiding the painful activities.      Limited activities include: lifting, repetitive use, work at or above shoulder height, difficulty sleeping, difficulty with ADLs. Mild stiffness is reported. Related history: negative for prior surgery, trauma, arthritis or disorders. Is affecting ADLs. Pain is 9/10 at it's worst.    Outside reports reviewed: Previous note from Ade Velázquez, [address] reviewed    Patient's medications, allergies, past medical, surgical, social and family histories were reviewed and updated as appropriate. Patient has previously attempted NSAIDs with mild pain relief    Medical History  Patient's medications, allergies, past medical, surgical, social and family histories were reviewed and updated as appropriate. Past Medical History:   Diagnosis Date    Abdominal pain, right lower quadrant     Asthma     Bee sting allergies     COVID-19 virus infection     jan 2022, defers vaccine    Decreased libido     Diabetes mellitus with nephropathy ([facility] [address]     Family history of coronary artery disease     Fatigue     Fatty liver disease, nonalcoholic     Hypertension     NEED EKG, CONSIDER CT CARDIAC SCORING    Screening for colorectal cancer     COLOGUARD NEG 8/2021- RECHECK DUE 3 YRS     Past Surgical History:   Procedure Laterality Date    ROTATOR CUFF REPAIR Right 2005    right     SEPTOPLASTY  1's     Family History   Problem Relation Age of Onset    High Cholesterol Mother     High Blood Pressure Mother     Diabetes Mother     Heart Disease Mother         CAD?     Thyroid Disease Mother         Hypothyroid     Social History     Socioeconomic History    Marital status:      Spouse name: None    Number of children: None    Years of education: None    Highest education level: None   Occupational History    Occupation: Truck Movers     Comment:     Occupation: [employer]    Occupation: [employer]     Comment: since 4/2019   Tobacco Use    Smoking status: Former Smoker     Packs/day: 0.50     Years: 10.00     Pack years: 5.00     Types: Cigarettes    Smokeless tobacco: Former User     Types: Chew    Tobacco comment: E Cig on regular basis   Vaping Use    Vaping Use: Never used   Substance and Sexual Activity    Alcohol use: Yes     Alcohol/week: 0.0 standard drinks     Comment: Occasional     Drug use: Never    Sexual activity: Yes     Partners: Female   Other Topics Concern    None   Social History Narrative    Diet: unrestricted    Exercise: A lot    Seat Belt: Mostly     Social Determinants of Health     Financial Resource Strain:     Difficulty of Paying Living Expenses: Not on file   Food Insecurity:     Worried About Running Out of Food in the Last Year: Not on file    Daquan of Food in the Last Year: Not on file   Transportation Needs:     Lack of Transportation (Medical): Not on file    Lack of Transportation (Non-Medical):  Not on file   Physical Activity:     Days of Exercise per Week: Not on file    Minutes of Exercise per Session: Not on file   Stress:     Feeling of Stress : Not on file   Social Connections:     Frequency of Communication with Friends and Family: Not on file    Frequency of Social Gatherings with Friends and Family: Not on file    Attends Scientologist Services: Not on file    Active Member of [address] [place] [address] [employer] or Organizations: Not on file    Attends Club or Organization Meetings: Not on file    Marital Status: Not on file   Intimate Partner Violence:     Fear of Current or Ex-Partner: Not on file    Emotionally Abused: Not on file    Physically Abused: Not on file    Sexually Abused: Not on file   Housing Stability:     Unable to Pay for Housing in the Last Year: Not on file    Number of [place] in the Last Year: Not on file    Unstable Housing in the Last Year: Not on file     Current Facility-Administered Medications   Medication Dose Route Frequency Provider Last Rate Last Admin    lactated ringers infusion   IntraVENous Continuous Reema Capone  mL/hr at 01/31/22 [facility] at 01/31/22 0643    ceFAZolin (ANCEF) 2000 mg in dextrose 4 % 100 mL IVPB (premix)  2,000 mg IntraVENous Once Veronika Zabala DO         Allergies   Allergen Reactions    Nsaids Other (See Comments)     Has diabetic nephropathy    Metformin And Related Diarrhea         Review of Systems   Constitutional: Positive for activity change. Negative for fatigue and unexpected weight change. HENT: Negative for hearing loss, sneezing and sore throat. Respiratory: Negative for cough, shortness of breath and wheezing. Cardiovascular: Negative for chest pain and leg swelling. Gastrointestinal: Negative for diarrhea, nausea and vomiting. Musculoskeletal: Positive for arthralgias, joint swelling and myalgias. Negative for back pain, gait problem, neck pain and neck stiffness. Skin: Negative for color change, pallor, rash and wound. Neurological: Negative for speech difficulty, weakness and numbness. Psychiatric/Behavioral: Negative for behavioral problems and confusion. The patient is not hyperactive. Examination:  General Exam:  Vitals: /83   Pulse 61   Temp 96.5 °F (35.8 °C) (Temporal)   Resp 18   Ht 6' 1\" (1.854 m)   Wt 204 lb (92.5 kg)   SpO2 97%   BMI 26.91 kg/m²    Physical Exam  Constitutional:       Appearance: Normal appearance. HENT:      Head: Normocephalic and atraumatic. Nose: Nose normal.   Eyes:      General:         Right eye: No discharge. Left eye: No discharge. Extraocular Movements: Extraocular movements intact. Cardiovascular:      Pulses: Normal pulses. Pulmonary:      Effort: Pulmonary effort is normal.      Breath sounds: Normal breath sounds. Musculoskeletal:         General: Tenderness present. No swelling, deformity or signs of injury. Right shoulder: Normal.      Left shoulder: Tenderness, bony tenderness and crepitus present.  No swelling, deformity, effusion or laceration. Decreased range of motion. Normal strength. Normal pulse. Right upper arm: Normal.      Left upper arm: Normal.      Cervical back: Normal and normal range of motion. No rigidity or tenderness. Skin:     General: Skin is warm and dry. Capillary Refill: Capillary refill takes less than 2 seconds. Neurological:      General: No focal deficit present. Mental Status: He is alert and oriented to person, place, and time. LEFT SHOULDER / UPPER EXTREMITY EXAM      OBSERVATION:      Swelling: none   Deformity: none    Discoloration: none   Scapular: winging none    Scars: none    Atrophy: none      TENDERNESS / CREPITUS (T/C):      Clavicle -/-    SUPRAspinatus  -/-     AC Jt. +/-    INFRAspinatus -/-     SC Jt. -/-    Deltoid -/-     G. Tuberosity +/-   LH BICEP groove +/-    Acromion: -/-    Midline Neck -/-     Scapular Spine -/-   Trapezium -/-    SMA Scapula -/-   GH jt. line - post +/-     Scapulothoracic -/-   GH jt. line - ant +/-       ROM: (* = with pain)  Left shoulder   Right shoulder     AROM (PROM)  AROM (PROM)    FE   170° (175°)    170° (175°)     ER 0°   60° (65°)   60° (65°)     ER 90° ABD  90° (90°)   90°  (90°)    IR 90° ABD  N/A (30°)   NA  (40°)     IR (spine) L5   T10      STRENGTH: (* = with pain) Left shoulder   Right shoulder    SCAPTION   5/5    5/5     IR    5/5    5/5     ER    5/5    5/5     BICEPS   5 -/5*    5/5     Deltoid   5/5    5/5       SIGNS:  LUE     NEER: neg    ODANIELLES: neg      CARRION: neg  SPEEDS: Positive    DROP ARM: neg  BELLY PRESS: neg    LIFT-OFF: neg  Superior escape: none     X-Body ADD: neg   MOVING VALGUS: neg     CRANK: Positive    Belly press: Negative    Biceps stretch: Positive      EXTREMITY NEURO-VASCULAR EXAM:     Sensation grossly intact to light touch all dermatomal regions.      DTR 2+ Biceps, Triceps, BR and Negative Sarahs sign    Grossly intact motor function at Elbow, Wrist and Hand    Distal pulses radial and ulnar 2+, brisk cap refill, symmetric. NECK:  Painless FROM and spinous processes non-tender. Negative Spurlings sign. Diagnostic testing:  No new imaging    Previous imaging:    MRI left shoulder demonstrates mild tendinosis of the supraspinatus but no obvious partial-thickness tearing. Tear of the superior and posterior superior labrum with degenerative leg labral fraying. Biceps tendinitis. AC joint osteoarthritis. No significant glenohumeral arthritis. Office Procedures:  Orders Placed This Encounter   Procedures    POCT Glucose     Per PAT anesthesia protocol- Diabetic     Standing Status:   Standing     Number of Occurrences:   1    POCT Glucose     Standing Status:   Standing     Number of Occurrences:   1    EKG 12 Lead     Per PAT anesthesia protocol- Diabetic and over age 48. Standing Status:   Standing     Number of Occurrences:   1     Order Specific Question:   Reason for Exam?     Answer:   Pre-op     Assessment and Plan    A: Left shoulder degenerative labral tearing    P:   I had a thorough discussion with the patient regarding his left shoulder MRI and treatment course. Because he has failed conservative measures I feel that he is a candidate for surgery. I discussed surgical intervention in the form of:    Left shoulder arthroscopic  1. Rotator cuff debridement versus repair  2. Subacromial decompression  3. Distal Clavicle Excision  4. Arthroscopic versus open biceps tenodesis  5. Debridement    I explained risks, benefits, possible complications of the procedure and answered all questions for the patient. I explained postoperative rehabilitation protocol and expectations with the patient today. The patient understands and consents to the procedure. The patient was counseled at length about the risks of ariana Covid-19 during their perioperative period and any recovery window from their procedure.   The patient was made aware that ariana Covid-19  may worsen their prognosis for recovering from their procedure  and lend to a higher morbidity and/or mortality risk. All material risks, benefits, and reasonable alternatives including postponing the procedure were discussed. The patient does wish to proceed with the procedure at this time. Pt seen and examined, No change in H+P.           Electronically signed by Promise Turner DO on 1/31/2022 at 6:57 AM

## 2022-01-31 NOTE — ANESTHESIA POSTPROCEDURE EVALUATION
Department of Anesthesiology  Postprocedure Note    Patient: Jose Palacios  MRN: [id number]  YOB: 1965  Date of evaluation: 1/31/2022  Time:  12:07 PM     Procedure Summary     Date: 01/31/22 Room / Location: [address] / [facility]    Anesthesia Start: 0017 Anesthesia Stop:     Procedure: LEFT SHOULDER ARTHROSCOPY ROTATOR CUFF DEBRIDMENT VERSUS REPAIR, SUBACROMIAL DECOMPRESSION, DISTAL CLAVICLE RESECTION, BICEPS TENODESIS (Left Shoulder) Diagnosis:       Type I SLAP lesion, left, initial encounter      (Type I SLAP lesion, left, initial encounter [J35.913D])    Surgeons: Odell Ahumada, DO Responsible Provider: Viktoriya Kat MD    Anesthesia Type: general, regional ASA Status: 2          Anesthesia Type: No value filed. González Phase I:      González Phase II:      Last vitals: Reviewed and per EMR flowsheets.        Anesthesia Post Evaluation    Patient location during evaluation: PACU  Patient participation: complete - patient participated  Level of consciousness: sleepy but conscious  Pain score: 0  Airway patency: patent  Nausea & Vomiting: no nausea and no vomiting  Complications: no  Cardiovascular status: hemodynamically stable  Respiratory status: room air and spontaneous ventilation  Hydration status: stable  Multimodal analgesia pain management approach

## 2022-01-31 NOTE — PROGRESS NOTES
Patient returned to room from pacu,  A+Ox4,VSS (see doc flow), assessment completed as per doc flow. Patient has no c/o pain. Patient able to wiggle fingers, and has weak  in L hand. Beverage offered. Call light in reach, bed in low position. RN to continue to monitor. Family at bedside.

## 2022-01-31 NOTE — PROGRESS NOTES
1201: Patient arrived to PACU from OR. Monitors applied, alarms on. Surgical dressing is clean, dry and intact. Ice applied, extremity elevated, sling in place. Nasal trumpet remains in place in right nare. Report obtained from Heidi, [address] [place] [address] and Earnest Alpers. CRNA. 1215: Patient repositioned in bed. Nasal trumpet removed, patient tolerated well. 1220: Patient states feeling to elbow area, no feeling to hand or fingers unable to  hand. 1245: Patient states feeling to hand, small  with hand at this time. 1300: Patient tolerating ice chips at this time. 1307: Phase 1 care complete. Patient transferred to [facility] 15. Report given bedside to [facility].

## 2022-01-31 NOTE — PROGRESS NOTES
Patient discharge instructions and prescriptions reviewed and verified. RN reviewed d/c instructions with patient and spouse at bedside. All questions answered. Prescription information  given to patient. Discharge paperwork signed by RN and patient's spouse. Armband removed. Discharged to car  via wheelchair, home with spouse, Rehan Oliva.

## 2022-02-11 ENCOUNTER — OFFICE VISIT (OUTPATIENT)
Dept: ORTHOPEDIC SURGERY | Age: 57
End: 2022-02-11

## 2022-02-11 VITALS
OXYGEN SATURATION: 96 % | TEMPERATURE: 97.3 F | WEIGHT: 202 LBS | HEIGHT: 73 IN | HEART RATE: 101 BPM | RESPIRATION RATE: 16 BRPM | BODY MASS INDEX: 26.77 KG/M2

## 2022-02-11 DIAGNOSIS — Z98.890 S/P ARTHROSCOPY OF LEFT SHOULDER: Primary | ICD-10-CM

## 2022-02-11 PROCEDURE — 99024 POSTOP FOLLOW-UP VISIT: CPT | Performed by: STUDENT IN AN ORGANIZED HEALTH CARE EDUCATION/TRAINING PROGRAM

## 2022-02-11 NOTE — PROGRESS NOTES
Date of surgery:  1/31/2022     Procedure:  1. Left shoulder Arthroscopic rotator cuff debridement   2.   Left shoulder Arthroscopic distal clavicle excision    3.   Left shoulder Arthroscopic subacromial decompression and bursectomy    4.   Left shoulder arthroscopic biceps tenotomy tenodesis    5.   Left shoulder Arthroscopic labral debridement    6.  Left shoulder Arthroscopic lysis of adhesions       History:  Ruth Puckett is here in follow up regarding their  Left shoulder arthroscopy. Patient is doing well. They have 4/10 pain. They deny chest pain, SOB, calf pain,fever,wound drainage. No other issues. Patient denies any constitutional symptoms. Patient states they have been compliant with restrictions. Patient states they have been using their sling as ordered. Physical:   Patient demonstrates appropriate mood and affect. Left upper extremity exam:   The incisions are clean, dry, intact and nontender with no erythema. They have minimal edema, the Arm compartments are soft . There are No cords or calf tenderness. No significant calf/ankle edema. They are neurovascularly intact distally. Stitches removed without issue    Sling removed for examination    Range of motion of the left shoulder demonstrates:  Active forward flexion approximately 90 degrees, external rotation approximately 40 degrees, abduction approximately 60 degrees    Minimal tenderness to palpation over arthroscopic portals      Imaging:   No new orthopedic imaging     Impression: Status post above, doing well        Plan:   -Intraoperative arthroscopic imaging reviewed with patient  -Patient to discontinue sling but can use it on occasion when needed such as if he is going out to a crowded place or if his arm feels tired  -Educated patient on exercises to work on range of motion as well as arm positions to avoid due to biceps tenodesis  -continue the PT protocol   -We will provide patients therapy with protocol as he will begin physical therapy sometime next week.   Referral was placed today  -rest/elevation as needed  -No refills for needed today  -f/u in 4 week(s)  -f/u sooner prn any issues

## 2022-02-11 NOTE — PROGRESS NOTES
Date of surgery:   January 31st   LEFT SHOULDER ARTHROSCOPY ROTATOR CUFF DEBRIDMENT VERSUS REPAIR, SUBACROMIAL DECOMPRESSION, DISTAL CLAVICLE RESECTION, BICEPS TENODESIS    History:  Mr. Diana Walker is here in follow up regarding his left shoulder  He is doing rather well. He is having 4/10 pain. He denies chest pain, SOB, calf pain,fever,wound drainage. No other issues. Physical: He demonstrates appropriate mood and affect. Left shoulder exam:  The incisions are clean, dry, intact and nontender with no erythema. He has no edema, the Arm compartments are soft . Stitches were removed without complications.

## 2022-02-17 ENCOUNTER — TELEPHONE (OUTPATIENT)
Dept: ORTHOPEDIC SURGERY | Age: 57
End: 2022-02-17

## 2022-02-17 NOTE — TELEPHONE ENCOUNTER
Patient walked into the office today with concerns of a blood clot in the right arm. I examined the right shoulder and did notice some bruising in the right bicep area. I did not see and redness or irritation of the right arm. Pt states his wife was concerned.  I advised the patient I did not believe he had a blood clot but to continue to watch the bruising and if he has any other concerns to call our office ASAP

## 2022-02-22 ENCOUNTER — HOSPITAL ENCOUNTER (OUTPATIENT)
Dept: PHYSICAL THERAPY | Age: 57
Setting detail: THERAPIES SERIES
Discharge: HOME | End: 2022-02-22
Payer: COMMERCIAL

## 2022-02-22 PROCEDURE — 97161 PT EVAL LOW COMPLEX 20 MIN: CPT

## 2022-02-22 PROCEDURE — 97110 THERAPEUTIC EXERCISES: CPT

## 2022-02-22 PROCEDURE — 97016 VASOPNEUMATIC DEVICE THERAPY: CPT

## 2022-02-22 ASSESSMENT — PAIN DESCRIPTION - LOCATION: LOCATION: SHOULDER

## 2022-02-22 ASSESSMENT — PAIN SCALES - GENERAL: PAINLEVEL_OUTOF10: 5

## 2022-02-22 ASSESSMENT — PAIN DESCRIPTION - ONSET: ONSET: AWAKENED FROM SLEEP

## 2022-02-22 ASSESSMENT — PAIN DESCRIPTION - FREQUENCY: FREQUENCY: CONTINUOUS

## 2022-02-22 ASSESSMENT — PAIN - FUNCTIONAL ASSESSMENT: PAIN_FUNCTIONAL_ASSESSMENT: PREVENTS OR INTERFERES WITH ALL ACTIVE AND SOME PASSIVE ACTIVITIES

## 2022-02-22 ASSESSMENT — PAIN DESCRIPTION - PAIN TYPE: TYPE: SURGICAL PAIN

## 2022-02-22 ASSESSMENT — PAIN DESCRIPTION - PROGRESSION: CLINICAL_PROGRESSION: GRADUALLY IMPROVING

## 2022-02-22 ASSESSMENT — PAIN DESCRIPTION - ORIENTATION: ORIENTATION: LEFT

## 2022-02-22 NOTE — FLOWSHEET NOTE
Outpatient Physical Therapy  [facility]           [x] Phone: [phone]   Fax: [phone]  [facility]           [] Phone: [phone]   Fax: [phone]        Physical Therapy Daily Treatment Note  Date:  2022    Patient Name:  Luke Prajapati    :  1965  MRN: [id number]  Restrictions/Precautions: limit ER to 40 deg (4 weeks), no extension past body (4 weeks), no shoulder motion behind the back, no closed chain *See folder for Samson protocol*  Diagnosis:   Diagnosis: L shoulder surg (biceps tenodesis without RC repair)  Date of Injury/Surgery: 22  Treatment Diagnosis: Treatment Diagnosis: Decreased LUE strength and mobility, limtied by pain    Insurance/Certification information: PT Insurance Information: [employer] $20 co-pay  Referring Physician:  Referring Practitioner: Dr. Shawn Arteaga  Next Doctor Visit:  --  Plan of care signed (Y/N):  N, sent 22  Outcome Measure: Quick DASH: 77%  Visit# / total visits:     Pain level: 5/10   Goals:     Patient goals : improve ability to return to work and golf  Short term goals  Time Frame for Short term goals: 5 weeks  Short term goal 1: Pt demo I w/ HEP and pain management  Short term goal 2: Pt demo Quick DASH score improvement by 25% to increase ability to return to work duties  Short term goal 3: Pt demo WNL shoulder PROM in all directions with pain <1/10 to improve overhead reaching  Short term goal 4: Pt demo >4/5 shoulder and elbow strength in all directions and pain reported <1/10 to facilitate return to work  Long term goals  Time Frame for Long term goals : 10 weeks  Long term goal 1: Pt demo ability to reach overhead x10 with 3 lbs to mimic reaching into an overhead cabinet  Long term goal 2: Pt demo WNL shoulder AROM in all directions with pain <1/10 to improve ability to compelte his work duties  Long term goal 3: Pt demo 5/5 shoulder and elbow strength in all directions and pain reported <1/10 to facilitate return to work    Summary of Evaluation:  Pt is 49year old male who underwent a biceps tenodesis, rotator cuff debridement, and distal clavicle resection on 1/31/22. Pt now has difficulties reaching/lifting his arm, washing his back, shaving his head, toilet hygiene, work duties, and golfing. Pt demo deficits this date that include reduced shoulder/elbow ROM, reduced rotator cuff/scapular/biceps strength, decreased tolerance to active movement, WNL sensation, ongoing pain with rest/activity. Pt will benefit with PT services with shoulder/elbow PROM/AAROM/AROM, progressive rotator cuff/scapular/biceps strengthening, GH mobilizations, modalities as needed for pain management, manual/STM per protocol to return to PLOF. Pt prior to onset of current condition had no pain with able to complete full ADLs and work activities. Patient received education on their current pathology and how their condition effects them with their functional activities. Patient understood discussion and questions were answered. Patient understands their activity limitations and understands rational for treatment progression. Subjective:  See gamaliel         Any changes in Ambulatory Summary Sheet? None        Objective:  See gamaliel   COVID screening questions were asked and patient attested that there had been no contact or symptoms        Exercises: (No more than 4 columns)   Exercise/Equipment 2/22/22 #1 Date Date           WARM UP                     TABLE      PROM of elbow/shoulder      *Wrist AROM      *Ball Squeezes      *Pendulums       *Scap Squeezes         STANDING                                                     PROPRIOCEPTION                                    MODALITIES      vaso 10'               Other Therapeutic Activities/Education:  Patient received education on their current pathology and how their condition effects them with their functional activities. Patient understood discussion and questions were answered.  Patient understands their activity limitations and understands rational for treatment progression. Home Exercise Program:  HO issued, reviewed and discussed with patient. Pt agreed to comply. Manual Treatments:  --      Modalities:  Gameready to L shoulder/elbow complex in sitting, low pressure, 34 deg x10' for pain management. No adverse effects. Communication with other providers:  gamaliel sent 2/22/22      Assessment:  (Response towards treatment session) (Pain Rating)     Pt is 49year old male who underwent a biceps tenodesis, rotator cuff debridement, and distal clavicle resection on 1/31/22. Pt now has difficulties reaching/lifting his arm, washing his back, shaving his head, toilet hygiene, work duties, and golfing. Pt demo deficits this date that include reduced shoulder/elbow ROM, reduced rotator cuff/scapular/biceps strength, decreased tolerance to active movement, WNL sensation, ongoing pain with rest/activity. Pt will benefit with PT services with shoulder/elbow PROM/AAROM/AROM, progressive rotator cuff/scapular/biceps strengthening, GH mobilizations, modalities as needed for pain management, manual/STM per protocol to return to PLOF. Pt prior to onset of current condition had no pain with able to complete full ADLs and work activities. Patient received education on their current pathology and how their condition effects them with their functional activities. Patient understood discussion and questions were answered. Patient understands their activity limitations and understands rational for treatment progression.        Plan for Next Session:        Time In / Time Out:   3843-1330        If BWC Please Indicate Time In/Out/Total Time  CPT Code Time in Time out Total Time                                                            Total for session             Timed Code/Total Treatment Minutes:  39'   (1) PT gamaliel  (1) TE 10'  (1) vaso 10'      Next Progress Note due: 10th visit       Plan of Care Interventions:  [x] Therapeutic Exercise  [] Modalities:  [x] Therapeutic Activity     [] Ultrasound  [] Estim  [x] Gait Training      [] Cervical Traction [] Lumbar Traction  [x] Neuromuscular Re-education    [] Cold/hotpack [] Iontophoresis   [x] Instruction in HEP      [x] Vasopneumatic   [] Dry Needling    [x] Manual Therapy               [] Aquatic Therapy              Electronically signed by:  Deanna Mcdaniels PT, DPT, CSCS 2/22/2022, 6:47 AM

## 2022-02-22 NOTE — PLAN OF CARE
Outpatient Physical Therapy           [place]           [] Phone: [phone]   Fax: [phone]  [facility]           [] Phone: [phone]   Fax: [phone]     To: Referring Practitioner: Dr. Colby Gay   From: Katheryne Curling, PT     Patient: Roldan Puente       : 1965  Diagnosis: Diagnosis: L shoulder surg   Treatment Diagnosis: Treatment Diagnosis: Decreased LUE strength and mobility, limtied by pain   Date: 2022    Physical Therapy Certification/Re-Certification Form  Dear Dr. Colby Gay,  The following patient has been evaluated for physical therapy services and for therapy to continue, insurance requires physician review of the treatment plan initially and every 90 days. Please review the attached evaluation and/or summary of the patient's plan of care, and verify that you agree therapy should continue by signing the attached document and sending it back to our office. Assessment:  Pt is 49year old male who underwent a biceps tenodesis, rotator cuff debridement, and distal clavicle resection on 22. Pt now has difficulties reaching/lifting his arm, washing his back, shaving his head, toilet hygiene, work duties, and golfing. Pt demo deficits this date that include reduced shoulder/elbow ROM, reduced rotator cuff/scapular/biceps strength, decreased tolerance to active movement, WNL sensation, ongoing pain with rest/activity. Pt will benefit with PT services with shoulder/elbow PROM/AAROM/AROM, progressive rotator cuff/scapular/biceps strengthening, GH mobilizations, modalities as needed for pain management, manual/STM per protocol to return to PLOF. Pt prior to onset of current condition had no pain with able to complete full ADLs and work activities. Patient received education on their current pathology and how their condition effects them with their functional activities. Patient understood discussion and questions were answered.  Patient understands their activity limitations and understands rational for treatment progression. Plan of Care/Treatment to date:  [x] Therapeutic Exercise  [x] Modalities:  [x] Therapeutic Activity     [] Ultrasound  [] Electrical Stimulation  [] Gait Training      [] Cervical Traction [] Lumbar Traction  [x] Neuromuscular Re-education    [] Cold/hotpack [] Iontophoresis   [x] Instruction in HEP      [x] Vasopneumatic    [] Dry Needling  [x] Manual Therapy               [] Aquatic Therapy       Other:          Frequency/Duration:  # Days per week: [] 1 day # Weeks: [] 1 week [] 5 weeks     [x] 2 days   [] 2 weeks [x] 6 weeks     [] 3 days   [] 3 weeks [] 7 weeks     [] 4 days   [] 4 weeks [] 8 weeks         [] 9 weeks [] 10 weeks         [] 11 weeks [] 12 weeks    Rehab Potential/Progress: [] Excellent [x] Good [] Fair  [] Poor     Goals:    Patient goals : improve ability to return to work and golf  Short term goals  Time Frame for Short term goals: 5 weeks  Short term goal 1: Pt demo I w/ HEP and pain management  Short term goal 2: Pt demo Quick DASH score improvement by 25% to increase ability to return to work duties  Short term goal 3: Pt demo WNL shoulder PROM in all directions with pain <1/10 to improve overhead reaching  Short term goal 4: Pt demo >4/5 shoulder and elbow strength in all directions and pain reported <1/10 to facilitate return to work  Long term goals  Time Frame for Long term goals : 10 weeks  Long term goal 1: Pt demo ability to reach overhead x10 with 3 lbs to mimic reaching into an overhead cabinet  Long term goal 2: Pt demo WNL shoulder AROM in all directions with pain <1/10 to improve ability to compelte his work duties  Long term goal 3: Pt demo 5/5 shoulder and elbow strength in all directions and pain reported <1/10 to facilitate return to work      Electronically signed by:  Brook Pena PT, DPT, AMOS 2/22/2022, 12:01 PM        If you have any questions or concerns, please don't hesitate to call.   Thank you for your referral.      Physician Signature:________________________________Date:_________ TIME: _____  By signing above, therapists plan is approved by physician

## 2022-02-22 NOTE — PROGRESS NOTES
Physical Therapy  Initial Assessment  Date: 2022  Patient Name: Cameron Dinero  MRN: [id number]  : 1965     Treatment Diagnosis: Decreased LUE strength and mobility, limtied by pain    Subjective   General  Chart Reviewed: Yes  Patient assessed for rehabilitation services?: Yes  Referring Practitioner: Dr. Priti Adams  Diagnosis: L shoulder surg  Follows Commands: Within Functional Limits  PT Visit Information  PT Insurance Information: [facility]  Subjective  Subjective: Patient reports surgery was on 22 with Dr. Priti Adams. States a 4-5/10 at rest. He has not iced because he never had any swelling. Has been removed from the sling after 2 weeks. F/U 3/11/22 with Hardie Gaucher. He is a laser operating, needs to go up over head and would like to return to golf; shaving his head and toilet hygiene. Has been sleeping on his reclining chair. Pain Screening  Patient Currently in Pain: Yes  Pain Assessment  Pain Assessment: 0-10  Pain Level: 5  Patient's Stated Pain Goal: No pain  Pain Type: Surgical pain  Pain Location: Shoulder  Pain Orientation: Left  Pain Descriptors: Aching; Sharp;Dull; Sore  Pain Frequency: Continuous  Pain Onset: Awakened from sleep  Clinical Progression: Gradually improving  Functional Pain Assessment: Prevents or interferes with all active and some passive activities  Vital Signs  Patient Currently in Pain: Yes    Vision/Hearing  Vision  Vision: Within Functional Limits  Hearing  Hearing: Within functional limits    Orientation  Orientation  Overall Orientation Status: Within Normal Limits    Social/Functional History  Social/Functional History  ADL Assistance: Independent  Homemaking Assistance: Independent  Ambulation Assistance: Independent  Transfer Assistance: Independent  Active : Yes  Mode of Transportation: Car  Occupation: On disability  Type of occupation: currently on short term disability  Leisure & Hobbies: golfing    Objective  Observation/Palpation  Posture: Good  Palpation: mild tenderness along biceps tendon, notable \"lump\" of insertion  Observation: Observation: pt ambulates into the clinic with LUE by his side, reduced arm swing, and no sling donned  Edema: Edema: no swelling noted all GH joint or incision  Scar: Incision: full healed incision with no scabbing noted, pt reports keloid scarring is very common for him    PROM RUE (degrees)  RUE PROM: WNL  AROM RUE (degrees)  RUE AROM : WNL  PROM LUE (degrees)  LUE General PROM: L shoulder PROM:Flexion: 80 deg ABD: 40 deg (*limited by discomfort)ER: unable to tolerate past neutral positioning IR: 30 deg  AROM LUE (degrees)  LUE General AROM: Unable to complete active motion of shoulder or elbow at this time (WNL wrist and finger AROM)  Spine  Special Tests: Elbow PROM:Extension: 20 deg from zero (limited by discomfort)Flexion: 110 deg  Joint Mobility  ROM RUE: Did not assess GH mobility at this time due to precautions    Strength RUE  Strength RUE: WFL  Strength LUE  L Shoulder Flexion: 2/5  L Shoulder ABduction: 2/5  L Shoulder Internal Rotation: 2/5  L Shoulder External Rotation: 2/5  L Elbow Flexion: 3-/5  L Elbow Extension: 3-/5  L Wrist Flexion: 5/5  L Wrist Extension: 5/5  L Wrist Radial Deviation: 5/5  L Wrist Ulnar Deviation: 5/5        Sensation  Overall Sensation Status: WNL    Assessment   Conditions Requiring Skilled Therapeutic Intervention  Body structures, Functions, Activity limitations: Decreased functional mobility ; Decreased ADL status; Decreased posture; Increased pain;Decreased sensation;Decreased ROM; Decreased strength;Decreased endurance  Pt is 49year old male who underwent a biceps tenodesis, rotator cuff debridement, and distal clavicle resection on 1/31/22. Pt now has difficulties reaching/lifting his arm, washing his back, shaving his head, toilet hygiene, work duties, and golfing.  Pt demo deficits this date that include reduced shoulder/elbow ROM, reduced rotator cuff/scapular/biceps strength, decreased tolerance to active movement, WNL sensation, ongoing pain with rest/activity. Pt will benefit with PT services with shoulder/elbow PROM/AAROM/AROM, progressive rotator cuff/scapular/biceps strengthening, GH mobilizations, modalities as needed for pain management, manual/STM per protocol to return to PLOF. Pt prior to onset of current condition had no pain with able to complete full ADLs and work activities. Patient received education on their current pathology and how their condition effects them with their functional activities. Patient understood discussion and questions were answered. Patient understands their activity limitations and understands rational for treatment progression.    Treatment Diagnosis: Decreased LUE strength and mobility, limtied by pain  Prognosis: Good  Decision Making: Low Complexity  Barriers to Learning: None-prefers demo and written  REQUIRES PT FOLLOW UP: Yes  Activity Tolerance  Activity Tolerance: Patient Tolerated treatment well    Plan   Plan  Times per week: 2x  Plan weeks: 10 weeks  Current Treatment Recommendations: Strengthening,Neuromuscular Re-education,Home Exercise Program,ROM,Manual Therapy - Soft Tissue Mobilization,Manual Therapy - Joint Manipulation,Modalities,Pain Management    Goals  Short term goals  Time Frame for Short term goals: 5 weeks  Short term goal 1: Pt demo I w/ HEP and pain management  Short term goal 2: Pt demo Quick DASH score improvement by 25% to increase ability to return to work duties  Short term goal 3: Pt demo WNL shoulder PROM in all directions with pain <1/10 to improve overhead reaching  Short term goal 4: Pt demo >4/5 shoulder and elbow strength in all directions and pain reported <1/10 to facilitate return to work  Long term goals  Time Frame for Long term goals : 10 weeks  Long term goal 1: Pt demo ability to reach overhead x10 with 3 lbs to mimic reaching into an overhead cabinet  Long term goal 2: Pt demo WNL shoulder AROM in all directions with pain <1/10 to improve ability to compelte his work duties  Long term goal 3: Pt demo 5/5 shoulder and elbow strength in all directions and pain reported <1/10 to facilitate return to work  Patient Goals   Patient goals : improve ability to return to work and [id number], PT, DPT, [employer]

## 2022-02-23 NOTE — ADDENDUM NOTE
Addendum  created 02/23/22 1122 by JOB Thomas CRNA    Clinical Note Signed, Diagnosis association updated, Intraprocedure Blocks edited

## 2022-02-25 ENCOUNTER — HOSPITAL ENCOUNTER (OUTPATIENT)
Dept: PHYSICAL THERAPY | Age: 57
Setting detail: THERAPIES SERIES
Discharge: HOME | End: 2022-02-25
Payer: COMMERCIAL

## 2022-02-25 PROCEDURE — 97110 THERAPEUTIC EXERCISES: CPT

## 2022-02-25 PROCEDURE — 97140 MANUAL THERAPY 1/> REGIONS: CPT

## 2022-02-25 PROCEDURE — 97016 VASOPNEUMATIC DEVICE THERAPY: CPT

## 2022-02-25 NOTE — FLOWSHEET NOTE
Outpatient Physical Therapy  [place]           [x] Phone: [phone]   Fax: [phone]  [place]           [] Phone: [phone]   Fax: [phone]        Physical Therapy Daily Treatment Note  Date:  2022    Patient Name:  James Boykin    :  1965  MRN: [id number]  Restrictions/Precautions: limit ER to 40 deg (4 weeks), no extension past body (4 weeks), no shoulder motion behind the back, no closed chain *See folder for Samson protocol*  Diagnosis:   Diagnosis: L shoulder surg (biceps tenodesis without RC repair)  Date of Injury/Surgery: 22  Treatment Diagnosis: Treatment Diagnosis: Decreased LUE strength and mobility, limtied by pain    Insurance/Certification information: PT Insurance Information: [employer] $20 co-pay  Referring Physician:  Referring Practitioner: Dr. Nancy Shi  Next Doctor Visit:  --  Plan of care signed (Y/N):  N, sent 22  Outcome Measure: Quick DASH: 77%  Visit# / total visits:     Pain level: 5/10   Goals:     Patient goals : improve ability to return to work and golf  Short term goals  Time Frame for Short term goals: 5 weeks  Short term goal 1: Pt demo I w/ HEP and pain management  Short term goal 2: Pt demo Quick DASH score improvement by 25% to increase ability to return to work duties  Short term goal 3: Pt demo WNL shoulder PROM in all directions with pain <1/10 to improve overhead reaching  Short term goal 4: Pt demo >4/5 shoulder and elbow strength in all directions and pain reported <1/10 to facilitate return to work  Long term goals  Time Frame for Long term goals : 10 weeks  Long term goal 1: Pt demo ability to reach overhead x10 with 3 lbs to mimic reaching into an overhead cabinet  Long term goal 2: Pt demo WNL shoulder AROM in all directions with pain <1/10 to improve ability to compelte his work duties  Long term goal 3: Pt demo 5/5 shoulder and elbow strength in all directions and pain reported <1/10 to facilitate return to Therapeutic Activities/Education:  Patient received education on their current pathology and how their condition effects them with their functional activities. Patient understood discussion and questions were answered. Patient understands their activity limitations and understands rational for treatment progression. Home Exercise Program:  HO issued, reviewed and discussed with patient. Pt agreed to comply. Manual Treatments:  PROM to tolerance x 17'      Modalities:  Gameready to L shoulder/elbow complex in sitting, low pressure, 34 deg x15' for pain management. No adverse effects. Communication with other providers:  eval sent 2/22/22      Assessment:  (Response towards treatment session) (Pain Rating)Pt tolerated treatment fairly well. Pt was able to get more flexion and abduction, but is limited with ER. Pt stated that he was \"frozen\" after vaso     Pt is 49year old male who underwent a biceps tenodesis, rotator cuff debridement, and distal clavicle resection on 1/31/22. Pt now has difficulties reaching/lifting his arm, washing his back, shaving his head, toilet hygiene, work duties, and golfing. Pt demo deficits this date that include reduced shoulder/elbow ROM, reduced rotator cuff/scapular/biceps strength, decreased tolerance to active movement, WNL sensation, ongoing pain with rest/activity. Pt will benefit with PT services with shoulder/elbow PROM/AAROM/AROM, progressive rotator cuff/scapular/biceps strengthening, GH mobilizations, modalities as needed for pain management, manual/STM per protocol to return to PLOF. Pt prior to onset of current condition had no pain with able to complete full ADLs and work activities. Patient received education on their current pathology and how their condition effects them with their functional activities. Patient understood discussion and questions were answered.  Patient understands their activity limitations and understands rational for treatment progression.        Plan for Next Session:        Time In / Time Out:   0493/5736       If BW Please Indicate Time In/Out/Total Time  CPT Code Time in Time out Total Time                                                            Total for session             Timed Code/Total Treatment Minutes:   25'/40'   1 man (17' ) 1 TE (8') 1 vaso (15')     Next Progress Note due: 10th visit       Plan of Care Interventions:  [x] Therapeutic Exercise  [] Modalities:  [x] Therapeutic Activity     [] Ultrasound  [] Estim  [x] Gait Training      [] Cervical Traction [] Lumbar Traction  [x] Neuromuscular Re-education    [] Cold/hotpack [] Iontophoresis   [x] Instruction in HEP      [x] Vasopneumatic   [] Dry Needling    [x] Manual Therapy               [] Aquatic Therapy              Electronically signed by:[place] Butcher, DEBBIE  02/25/22 2/25/2022,2:14 PM

## 2022-03-01 ENCOUNTER — HOSPITAL ENCOUNTER (OUTPATIENT)
Dept: PHYSICAL THERAPY | Age: 57
Setting detail: THERAPIES SERIES
Discharge: HOME | End: 2022-03-01
Payer: COMMERCIAL

## 2022-03-01 PROCEDURE — 97110 THERAPEUTIC EXERCISES: CPT

## 2022-03-01 PROCEDURE — 97140 MANUAL THERAPY 1/> REGIONS: CPT

## 2022-03-01 PROCEDURE — 97016 VASOPNEUMATIC DEVICE THERAPY: CPT

## 2022-03-01 NOTE — FLOWSHEET NOTE
Outpatient Physical Therapy  [place]           [x] Phone: [phone]   Fax: [phone]  [place]           [] Phone: [phone]   Fax: [phone]        Physical Therapy Daily Treatment Note  Date:  3/1/2022    Patient Name:  Yair Rodriguez    :  1965  MRN: [id number]  Restrictions/Precautions: limit ER to 40 deg (4 weeks), no extension past body (4 weeks), no shoulder motion behind the back, no closed chain *See folder for Samson protocol*  Diagnosis:   Diagnosis: L shoulder surg (biceps tenodesis without RC repair)  Date of Injury/Surgery: 22  Treatment Diagnosis: Treatment Diagnosis: Decreased LUE strength and mobility, limtied by pain    Insurance/Certification information: PT Insurance Information: [employer] $20 co-pay  Referring Physician:  Referring Practitioner: Dr. Catrina Boeck  Next Doctor Visit:  --  Plan of care signed (Y/N):  YES, sent 22  Outcome Measure: Quick DASH: 77%  Visit# / total visits:     Pain level: 5/10   Goals:     Patient goals : improve ability to return to work and golf  Short term goals  Time Frame for Short term goals: 5 weeks  Short term goal 1: Pt demo I w/ HEP and pain management  Short term goal 2: Pt demo Quick DASH score improvement by 25% to increase ability to return to work duties  Short term goal 3: Pt demo WNL shoulder PROM in all directions with pain <1/10 to improve overhead reaching  Short term goal 4: Pt demo >4/5 shoulder and elbow strength in all directions and pain reported <1/10 to facilitate return to work  Long term goals  Time Frame for Long term goals : 10 weeks  Long term goal 1: Pt demo ability to reach overhead x10 with 3 lbs to mimic reaching into an overhead cabinet  Long term goal 2: Pt demo WNL shoulder AROM in all directions with pain <1/10 to improve ability to compelte his work duties  Long term goal 3: Pt demo 5/5 shoulder and elbow strength in all directions and pain reported <1/10 to facilitate return to work    Summary of Evaluation:  Pt is 49year old male who underwent a biceps tenodesis, rotator cuff debridement, and distal clavicle resection on 1/31/22. Pt now has difficulties reaching/lifting his arm, washing his back, shaving his head, toilet hygiene, work duties, and golfing. Pt demo deficits this date that include reduced shoulder/elbow ROM, reduced rotator cuff/scapular/biceps strength, decreased tolerance to active movement, WNL sensation, ongoing pain with rest/activity. Pt will benefit with PT services with shoulder/elbow PROM/AAROM/AROM, progressive rotator cuff/scapular/biceps strengthening, GH mobilizations, modalities as needed for pain management, manual/STM per protocol to return to PLOF. Pt prior to onset of current condition had no pain with able to complete full ADLs and work activities. Patient received education on their current pathology and how their condition effects them with their functional activities. Patient understood discussion and questions were answered. Patient understands their activity limitations and understands rational for treatment progression. Subjective: Viki Rangel reports 5/10 upon arrival,states good consistency with his HEP. Continues to sleep in the recliner for comfort. Any changes in Ambulatory Summary Sheet?   None      Objective:  See eval   COVID screening questions were asked and patient attested that there had been no contact or symptoms    Shoulder PROM:  124°flexion  80° abduction  Neutral ER  IR to stomach      Exercises: (No more than 4 columns)   Exercise/Equipment 2/22/22 #1 2/25/2022 #2 3/1/22 #3           WARM UP                     TABLE      PROM of elbow/shoulder  man Man see below    *Wrist AROM  10x2 ea --   *Ball Squeezes  During vaso --   *Pendulums   reviewed x15   *Scap Squeezes     10x2 2x10 5\"   Cane Seated ER   2x10 3\"               STANDING                                                     PROPRIOCEPTION MODALITIES      vaso 10' 15' 10'             Other Therapeutic Activities/Education:  Patient received education on their current pathology and how their condition effects them with their functional activities. Patient understood discussion and questions were answered. Patient understands their activity limitations and understands rational for treatment progression. Home Exercise Program:  HO issued, reviewed and discussed with patient. Pt agreed to comply. Manual Treatments:  PROM to tolerance x 25'      Modalities:  Gameready to L shoulder/elbow complex in sitting, low pressure, 34 deg x15' for pain management. No adverse effects. Communication with other providers:  eval sent 2/22/22      Assessment:  Elsa Bence demonstrates fair tolerance to today's session. Noted increased difficulties getting to neutral position while stretching passive ER. Added seated cane external rotation within pain tolerance to continue progression of mobility at home. End of session: 2/10     Pt is 49year old male who underwent a biceps tenodesis, rotator cuff debridement, and distal clavicle resection on 1/31/22. Pt now has difficulties reaching/lifting his arm, washing his back, shaving his head, toilet hygiene, work duties, and golfing. Pt demo deficits this date that include reduced shoulder/elbow ROM, reduced rotator cuff/scapular/biceps strength, decreased tolerance to active movement, WNL sensation, ongoing pain with rest/activity. Pt will benefit with PT services with shoulder/elbow PROM/AAROM/AROM, progressive rotator cuff/scapular/biceps strengthening, GH mobilizations, modalities as needed for pain management, manual/STM per protocol to return to PLOF. Pt prior to onset of current condition had no pain with able to complete full ADLs and work activities. Patient received education on their current pathology and how their condition effects them with their functional activities.  Patient understood discussion and questions were answered. Patient understands their activity limitations and understands rational for treatment progression.        Plan for Next Session:  --      Time In / Time Out:   2490-0922      Timed Code/Total Treatment Minutes:   35'/45'   1 man (25') 1 TE (10') 1 vaso (10')     Next Progress Note due: 10th visit       Plan of Care Interventions:  [x] Therapeutic Exercise  [] Modalities:  [x] Therapeutic Activity     [] Ultrasound  [] Estim  [x] Gait Training      [] Cervical Traction [] Lumbar Traction  [x] Neuromuscular Re-education    [] Cold/hotpack [] Iontophoresis   [x] Instruction in HEP      [x] Vasopneumatic   [] Dry Needling    [x] Manual Therapy               [] Aquatic Therapy              Electronically signed by:Jailyn Peterson, PT, DPT, CSCS    3/1/2022,6:47 AM

## 2022-03-03 ENCOUNTER — HOSPITAL ENCOUNTER (OUTPATIENT)
Dept: PHYSICAL THERAPY | Age: 57
Setting detail: THERAPIES SERIES
Discharge: HOME | End: 2022-03-03
Payer: COMMERCIAL

## 2022-03-03 PROCEDURE — 97110 THERAPEUTIC EXERCISES: CPT

## 2022-03-03 PROCEDURE — 97016 VASOPNEUMATIC DEVICE THERAPY: CPT

## 2022-03-03 PROCEDURE — 97140 MANUAL THERAPY 1/> REGIONS: CPT

## 2022-03-03 NOTE — FLOWSHEET NOTE
Outpatient Physical Therapy  [place]           [x] Phone: [phone]   Fax: [phone]  [place]           [] Phone: [phone]   Fax: [phone]        Physical Therapy Daily Treatment Note  Date:  3/3/2022    Patient Name:  Luke Prajapati    :  1965  MRN: [id number]  Restrictions/Precautions: limit ER to 40 deg (4 weeks), no extension past body (4 weeks), no shoulder motion behind the back, no closed chain *See folder for [facility]on protocol*  Diagnosis:   Diagnosis: L shoulder surg (biceps tenodesis without RC repair)  Date of Injury/Surgery: 22  Treatment Diagnosis: Treatment Diagnosis: Decreased LUE strength and mobility, limtied by pain    Insurance/Certification information: PT Insurance Information: [employer] $20 co-pay  Referring Physician:  Referring Practitioner: Dr. Shawn Arteaga  Next Doctor Visit:  --  Plan of care signed (Y/N):  YES, sent 22  Outcome Measure: Quick DASH: 77%  Visit# / total visits:   3/12  Pain level: 5/10   Goals:     Patient goals : improve ability to return to work and golf  Short term goals  Time Frame for Short term goals: 5 weeks  Short term goal 1: Pt demo I w/ HEP and pain management  Short term goal 2: Pt demo Quick DASH score improvement by 25% to increase ability to return to work duties  Short term goal 3: Pt demo WNL shoulder PROM in all directions with pain <1/10 to improve overhead reaching  Short term goal 4: Pt demo >4/5 shoulder and elbow strength in all directions and pain reported <1/10 to facilitate return to work  Long term goals  Time Frame for Long term goals : 10 weeks  Long term goal 1: Pt demo ability to reach overhead x10 with 3 lbs to mimic reaching into an overhead cabinet  Long term goal 2: Pt demo WNL shoulder AROM in all directions with pain <1/10 to improve ability to compelte his work duties  Long term goal 3: Pt demo 5/5 shoulder and elbow strength in all directions and pain reported <1/10 to facilitate return to work    Summary of Evaluation:  Pt is 49year old male who underwent a biceps tenodesis, rotator cuff debridement, and distal clavicle resection on 1/31/22. Pt now has difficulties reaching/lifting his arm, washing his back, shaving his head, toilet hygiene, work duties, and golfing. Pt demo deficits this date that include reduced shoulder/elbow ROM, reduced rotator cuff/scapular/biceps strength, decreased tolerance to active movement, WNL sensation, ongoing pain with rest/activity. Pt will benefit with PT services with shoulder/elbow PROM/AAROM/AROM, progressive rotator cuff/scapular/biceps strengthening, GH mobilizations, modalities as needed for pain management, manual/STM per protocol to return to PLOF. Pt prior to onset of current condition had no pain with able to complete full ADLs and work activities. Patient received education on their current pathology and how their condition effects them with their functional activities. Patient understood discussion and questions were answered. Patient understands their activity limitations and understands rational for treatment progression. Subjective: Khadijah Mediate reports 5/10 upon arrival,states good consistency with his HEP. Continues to sleep in the recliner for comfort. Any changes in Ambulatory Summary Sheet?   None      Objective:  See eval   COVID screening questions were asked and patient attested that there had been no contact or symptoms    Shoulder PROM:  124°flexion  80° abduction  Neutral ER  IR to stomach      Exercises: (No more than 4 columns)   Exercise/Equipment 2/22/22 #1 2/25/2022 #2 3/1/22 #3 3/3/22 #3            WARM UP          [employer]              TABLE       PROM of elbow/shoulder  man Man see below  Man see below    *Wrist AROM  10x2 ea -- --   *Ball Squeezes  During vaso -- --   *Pendulums   reviewed x15 x10   *Scap Squeezes     10x2 2x10 5\" 2x10 5\"   Cane Seated ER   2x10 3\" x10 3\" within pain tolerance    Supine Cane Flexion    2x5 within pain tolerance          STANDING                                                             PROPRIOCEPTION                                          MODALITIES       vaso 10' 15' 10' 10'              Other Therapeutic Activities/Education:  Patient received education on their current pathology and how their condition effects them with their functional activities. Patient understood discussion and questions were answered. Patient understands their activity limitations and understands rational for treatment progression. Home Exercise Program:  HO issued, reviewed and discussed with patient. Pt agreed to comply. Manual Treatments:  PROM to tolerance x 25'      Modalities:  Gameready to L shoulder/elbow complex in sitting, low pressure, 34 deg x15' for pain management. No adverse effects. Communication with other providers:  eval sent 2/22/22      Assessment:  Jude Bassett demonstrates fair tolerance to today's session. Noted some slight decreases in stiffness with external rotation, but onging restrictions due to pain. Added supine cane flexion for ongoing progressions for assisted overhead mobility. End of session: 2/10     Pt is 49year old male who underwent a biceps tenodesis, rotator cuff debridement, and distal clavicle resection on 1/31/22. Pt now has difficulties reaching/lifting his arm, washing his back, shaving his head, toilet hygiene, work duties, and golfing. Pt demo deficits this date that include reduced shoulder/elbow ROM, reduced rotator cuff/scapular/biceps strength, decreased tolerance to active movement, WNL sensation, ongoing pain with rest/activity. Pt will benefit with PT services with shoulder/elbow PROM/AAROM/AROM, progressive rotator cuff/scapular/biceps strengthening, GH mobilizations, modalities as needed for pain management, manual/STM per protocol to return to PLOF. Pt prior to onset of current condition had no pain with able to complete full ADLs and work activities. Patient received education on their current pathology and how their condition effects them with their functional activities. Patient understood discussion and questions were answered. Patient understands their activity limitations and understands rational for treatment progression.        Plan for Next Session:  --      Time In / Time Out:   8028-5606      Timed Code/Total Treatment Minutes:   30'/40'   1 man (20') 1 TE (10') 1 vaso (10')     Next Progress Note due: 10th visit       Plan of Care Interventions:  [x] Therapeutic Exercise  [] Modalities:  [x] Therapeutic Activity     [] Ultrasound  [] Estim  [x] Gait Training      [] Cervical Traction [] Lumbar Traction  [x] Neuromuscular Re-education    [] Cold/hotpack [] Iontophoresis   [x] Instruction in HEP      [x] Vasopneumatic   [] Dry Needling    [x] Manual Therapy               [] Aquatic Therapy              Electronically signed by:Jailyn Fontaine, PT, DPT, CSCS    3/3/2022,6:30 AM

## 2022-03-08 ENCOUNTER — HOSPITAL ENCOUNTER (OUTPATIENT)
Dept: PHYSICAL THERAPY | Age: 57
Setting detail: THERAPIES SERIES
Discharge: HOME | End: 2022-03-08
Payer: COMMERCIAL

## 2022-03-08 PROCEDURE — 97110 THERAPEUTIC EXERCISES: CPT

## 2022-03-08 PROCEDURE — 97016 VASOPNEUMATIC DEVICE THERAPY: CPT

## 2022-03-08 PROCEDURE — 97140 MANUAL THERAPY 1/> REGIONS: CPT

## 2022-03-08 NOTE — FLOWSHEET NOTE
Outpatient Physical Therapy  [place]           [x] Phone: [phone]   Fax: [phone]  Aysha Willis           [] Phone: [phone]   Fax: [phone]        Physical Therapy Daily Treatment Note  Date:  3/8/2022    Patient Name:  Ale Lomas    :  1965  MRN: [id number]  Restrictions/Precautions: limit ER to 40 deg (4 weeks), no extension past body (4 weeks), no shoulder motion behind the back, no closed chain *See folder for [facility] protocol*  Diagnosis:   Diagnosis: L shoulder surg (biceps tenodesis without RC repair)  Date of Injury/Surgery: 22  Treatment Diagnosis: Treatment Diagnosis: Decreased LUE strength and mobility, limtied by pain    Insurance/Certification information: PT Insurance Information: [employer] $20 co-pay  Referring Physician:  Referring Practitioner: Dr. Da Cabrera  Next Doctor Visit:  --  Plan of care signed (Y/N):  YES, sent 22  Outcome Measure: Quick DASH: 77%  Visit# / total visits:     Pain level: 4/10   Goals:     Patient goals : improve ability to return to work and golf  Short term goals  Time Frame for Short term goals: 5 weeks  Short term goal 1: Pt demo I w/ HEP and pain management  Short term goal 2: Pt demo Quick DASH score improvement by 25% to increase ability to return to work duties  Short term goal 3: Pt demo WNL shoulder PROM in all directions with pain <1/10 to improve overhead reaching  Short term goal 4: Pt demo >4/5 shoulder and elbow strength in all directions and pain reported <1/10 to facilitate return to work  Long term goals  Time Frame for Long term goals : 10 weeks  Long term goal 1: Pt demo ability to reach overhead x10 with 3 lbs to mimic reaching into an overhead cabinet  Long term goal 2: Pt demo WNL shoulder AROM in all directions with pain <1/10 to improve ability to compelte his work duties  Long term goal 3: Pt demo 5/5 shoulder and elbow strength in all directions and pain reported <1/10 to facilitate return to work    Summary of Evaluation:  Pt is 49year old male who underwent a biceps tenodesis, rotator cuff debridement, and distal clavicle resection on 1/31/22. Pt now has difficulties reaching/lifting his arm, washing his back, shaving his head, toilet hygiene, work duties, and golfing. Pt demo deficits this date that include reduced shoulder/elbow ROM, reduced rotator cuff/scapular/biceps strength, decreased tolerance to active movement, WNL sensation, ongoing pain with rest/activity. Pt will benefit with PT services with shoulder/elbow PROM/AAROM/AROM, progressive rotator cuff/scapular/biceps strengthening, GH mobilizations, modalities as needed for pain management, manual/STM per protocol to return to OF. Pt prior to onset of current condition had no pain with able to complete full ADLs and work activities. Patient received education on their current pathology and how their condition effects them with their functional activities. Patient understood discussion and questions were answered. Patient understands their activity limitations and understands rational for treatment progression. Subjective: Pt stated that his pain was 4/10 today. Pt stated that he started sleeping in his bed Saturday night. Pt stated that he sees Dr. Shahzad Jones on the 11th. Any changes in Ambulatory Summary Sheet?   None      Objective:    COVID screening questions were asked and patient attested that there had been no contact or symptoms    Shoulder PROM:  130°flexion  85° abduction    IR to stomach   tight L UT that responded well to [facility]    Exercises: (No more than 4 columns)   Exercise/Equipment 2/25/2022 #2 3/1/22 #3 3/3/22 #3 3/8/2022 #5            WARM UP          Laith's              TABLE       PROM of elbow/shoulder man Man see below  Man see below  Man   *Wrist AROM 10x2 ea -- --    *Ball Squeezes During vaso -- --    *Pendulums  reviewed x15 x10    *Scap Squeezes    10x2 2x10 5\" 2x10 5\" 10x 2 5\"   Cane Seated ER  2x10 3\" x10 3\" within pain tolerance   [id number] 3\"   Supine Cane Flexion   2x5 within pain tolerance 10x 3\"    Seated table slides in flexion    10x2 3\"    STANDING                                                             PROPRIOCEPTION                                          MODALITIES       vaso 15' 10' 10'               Other Therapeutic Activities/Education:  Patient received education on their current pathology and how their condition effects them with their functional activities. Patient understood discussion and questions were answered. Patient understands their activity limitations and understands rational for treatment progression. Home Exercise Program:  HO issued, reviewed and discussed with patient. Pt agreed to comply. Manual Treatments:  PROM to tolerance and STM to L UT x 15'      Modalities:  Gameready to L shoulder/elbow complex in sitting, low pressure, 34 deg x15' for pain management. No adverse effects. Communication with other providers:  eval sent 2/22/22      Assessment: Pt tolerated treatment fair today. Pt was able to get more ROM today. Pt rated pain  3/10 ( dull ache) after vaso. Pt will continue to benefit from more ROM and strengthening as per protocol. Pt is 49year old male who underwent a biceps tenodesis, rotator cuff debridement, and distal clavicle resection on 1/31/22. Pt now has difficulties reaching/lifting his arm, washing his back, shaving his head, toilet hygiene, work duties, and golfing. Pt demo deficits this date that include reduced shoulder/elbow ROM, reduced rotator cuff/scapular/biceps strength, decreased tolerance to active movement, WNL sensation, ongoing pain with rest/activity. Pt will benefit with PT services with shoulder/elbow PROM/AAROM/AROM, progressive rotator cuff/scapular/biceps strengthening, GH mobilizations, modalities as needed for pain management, manual/STM per protocol to return to PLOF.  Pt prior to onset of current condition had no pain with able to complete full ADLs and work activities. Patient received education on their current pathology and how their condition effects them with their functional activities. Patient understood discussion and questions were answered. Patient understands their activity limitations and understands rational for treatment progression.        Plan for Next Session:  --      Time In / Time Out: 1030/ 1123      Timed Code/Total Treatment Minutes:  38'/ 48' 2 TE (23') 1 Man (15') 1 vaso (15')     Next Progress Note due: 10th visit       Plan of Care Interventions:  [x] Therapeutic Exercise  [] Modalities:  [x] Therapeutic Activity     [] Ultrasound  [] Estim  [x] Gait Training      [] Cervical Traction [] Lumbar Traction  [x] Neuromuscular Re-education    [] Cold/hotpack [] Iontophoresis   [x] Instruction in HEP      [x] Vasopneumatic   [] Dry Needling    [x] Manual Therapy               [] Aquatic Therapy              Electronically signed by:Zonia Fox PTA    3/8/2022,10:29 AM     3/8/2022,12:51 PM

## 2022-03-10 ENCOUNTER — HOSPITAL ENCOUNTER (OUTPATIENT)
Dept: PHYSICAL THERAPY | Age: 57
Setting detail: THERAPIES SERIES
Discharge: HOME | End: 2022-03-10
Payer: COMMERCIAL

## 2022-03-10 PROCEDURE — 97016 VASOPNEUMATIC DEVICE THERAPY: CPT

## 2022-03-10 PROCEDURE — 97140 MANUAL THERAPY 1/> REGIONS: CPT

## 2022-03-10 PROCEDURE — 97110 THERAPEUTIC EXERCISES: CPT

## 2022-03-10 NOTE — FLOWSHEET NOTE
Outpatient Physical Therapy  [place]           [x] Phone: [phone]   Fax: [phone]  [place] [facility]           [] Phone: [phone]   Fax: [phone]        Physical Therapy Daily Treatment Note  Date:  3/10/2022    Patient Name:  Rodger Jordan    :  1965  MRN: [id number]  Restrictions/Precautions: limit ER to 40 deg (4 weeks), no extension past body (4 weeks), no shoulder motion behind the back, no closed chain *See folder for [facility] protocol*  Diagnosis:   Diagnosis: L shoulder surg (biceps tenodesis without RC repair)  Date of Injury/Surgery: 22  Treatment Diagnosis: Treatment Diagnosis: Decreased LUE strength and mobility, limtied by pain    Insurance/Certification information: PT Insurance Information: [employer] $20 co-pay  Referring Physician:  Referring Practitioner: Dr. Keyona Isabel  Next Doctor Visit:  --  Plan of care signed (Y/N):  YES, sent 22  Outcome Measure: Quick DASH: 77%  Visit# / total visits:     Pain level: 10   Goals:     Patient goals : improve ability to return to work and golf  Short term goals  Time Frame for Short term goals: 5 weeks  Short term goal 1: Pt demo I w/ HEP and pain management reports good compliance  Short term goal 2: Pt demo Quick DASH score improvement by 25% to increase ability to return to work duties  Short term goal 3: Pt demo WNL shoulder PROM in all directions with pain <1/10 to improve overhead reaching  Short term goal 4: Pt demo >4/5 shoulder and elbow strength in all directions and pain reported <1/10 to facilitate return to work  Long term goals  Time Frame for Long term goals : 10 weeks  Long term goal 1: Pt demo ability to reach overhead x10 with 3 lbs to mimic reaching into an overhead cabinet  Long term goal 2: Pt demo WNL shoulder AROM in all directions with pain <1/10 to improve ability to compelte his work duties  Long term goal 3: Pt demo 5/5 shoulder and elbow strength in all directions and pain reported <1/10 to facilitate return to work    Summary of Evaluation:  Pt is 49year old male who underwent a biceps tenodesis, rotator cuff debridement, and distal clavicle resection on 1/31/22. Pt now has difficulties reaching/lifting his arm, washing his back, shaving his head, toilet hygiene, work duties, and golfing. Pt demo deficits this date that include reduced shoulder/elbow ROM, reduced rotator cuff/scapular/biceps strength, decreased tolerance to active movement, WNL sensation, ongoing pain with rest/activity. Pt will benefit with PT services with shoulder/elbow PROM/AAROM/AROM, progressive rotator cuff/scapular/biceps strengthening, GH mobilizations, modalities as needed for pain management, manual/STM per protocol to return to PLOF. Pt prior to onset of current condition had no pain with able to complete full ADLs and work activities. Patient received education on their current pathology and how their condition effects them with their functional activities. Patient understood discussion and questions were answered. Patient understands their activity limitations and understands rational for treatment progression. Subjective: Pt stated that his pain was 4/10 today. Reports some minimal soreness following the last session. Any changes in Ambulatory Summary Sheet?   None      Objective:    COVID screening questions were asked and patient attested that there had been no contact or symptoms    Shoulder ROM:  130°flexion (PROM)  85° abduction (PROM)    123 deg (AAROM) shoulder flexion supine  5 deg (AROM) ER Supine    Exercises: (No more than 4 columns)   Exercise/Equipment 3/3/22 #4 3/8/2022 #5 3/10/22 #6           WARM UP         Laith's   x15 scaption         TABLE      PROM of elbow/shoulder Man see below  Man AROM flex/ext, sup/pro  x10 ea dir without weight     x10 bicep curls 1#   *Wrist AROM --     *Ball Squeezes --     *Pendulums  x10     *Scap Squeezes    2x10 5\" 10x 2 5\"    Cane Seated ER x10 3\" within pain tolerance   [id number] 3\" x15 5\"   Supine Cane Flexion 2x5 within pain tolerance 10x 3\"  x15 3\"   Seated table slides in flexion  10x2 3\"  --   ER isomet   x10 5\" into wall    x10 5\" into therapist hand         STANDING      Roller on Wall   x10 3\"                                            PROPRIOCEPTION                                    MODALITIES      vaso 10'  10'             Other Therapeutic Activities/Education:  Patient received education on their current pathology and how their condition effects them with their functional activities. Patient understood discussion and questions were answered. Patient understands their activity limitations and understands rational for treatment progression. Home Exercise Program:  HO issued, reviewed and discussed with patient. Pt agreed to comply. Manual Treatments:  PROM ER to tolerance x10'       Modalities:  Gameready to L shoulder/elbow complex in sitting, low pressure, 34 deg x15' for pain management. No adverse effects. Communication with other providers:  gamaliel sent 2/22/22      Assessment: Rudolph Calvillo demonstrates good tolerance to today's session. Continues to have deficits in shoulder external rotation and able to achieve 5 deg following stretching. Addition of external rotation isometric and active elbow flex/ext/sup/pro to home program; no pain reported during these exercises. Noting good improvements in overhead movement following stretching; continue to progress as appropriate. End of session: 1/10     Pt is 49year old male who underwent a biceps tenodesis, rotator cuff debridement, and distal clavicle resection on 1/31/22. Pt now has difficulties reaching/lifting his arm, washing his back, shaving his head, toilet hygiene, work duties, and golfing.  Pt demo deficits this date that include reduced shoulder/elbow ROM, reduced rotator cuff/scapular/biceps strength, decreased tolerance to active movement, WNL sensation, ongoing pain with rest/activity. Pt will benefit with PT services with shoulder/elbow PROM/AAROM/AROM, progressive rotator cuff/scapular/biceps strengthening, GH mobilizations, modalities as needed for pain management, manual/STM per protocol to return to PLOF. Pt prior to onset of current condition had no pain with able to complete full ADLs and work activities. Patient received education on their current pathology and how their condition effects them with their functional activities. Patient understood discussion and questions were answered. Patient understands their activity limitations and understands rational for treatment progression.        Plan for Next Session:  --      Time In / Time Out: 3627-9288      Timed Code/Total Treatment Minutes:  45'/ 54'       2 TE (35') 1 Man (10') 1 vaso (10')     Next Progress Note due: 10th visit       Plan of Care Interventions:  [x] Therapeutic Exercise  [] Modalities:  [x] Therapeutic Activity     [] Ultrasound  [] Estim  [x] Gait Training      [] Cervical Traction [] Lumbar Traction  [x] Neuromuscular Re-education    [] Cold/hotpack [] Iontophoresis   [x] Instruction in HEP      [x] Vasopneumatic   [] Dry Needling    [x] Manual Therapy               [] Aquatic Therapy              Electronically signed by:Jailyn Adair, PT, DPT, CSCS    3/10/2022,6:41 AM  3/10/2022,6:41 AM

## 2022-03-11 ENCOUNTER — OFFICE VISIT (OUTPATIENT)
Dept: ORTHOPEDIC SURGERY | Age: 57
End: 2022-03-11

## 2022-03-11 VITALS
HEIGHT: 73 IN | BODY MASS INDEX: 26.51 KG/M2 | WEIGHT: 200 LBS | RESPIRATION RATE: 16 BRPM | HEART RATE: 71 BPM | OXYGEN SATURATION: 98 %

## 2022-03-11 DIAGNOSIS — Z09 POSTOP CHECK: Primary | ICD-10-CM

## 2022-03-11 PROCEDURE — 99024 POSTOP FOLLOW-UP VISIT: CPT | Performed by: STUDENT IN AN ORGANIZED HEALTH CARE EDUCATION/TRAINING PROGRAM

## 2022-03-11 NOTE — PROGRESS NOTES
Date of surgery:  1/31/2022     Procedure:  1. Left shoulder Arthroscopic rotator cuff debridement   2.   Left shoulder Arthroscopic distal clavicle excision    3.   Left shoulder Arthroscopic subacromial decompression and bursectomy    4.   Left shoulder arthroscopic biceps tenotomy tenodesis    5.   Left shoulder Arthroscopic labral debridement    6.  Left shoulder Arthroscopic lysis of adhesions       [facility] week follow up regarding their  Left shoulder arthroscopy. Patient is doing well. They have 2/10 pain. They deny chest pain, SOB, calf pain,fever,wound drainage. No other issues. Patient denies any constitutional symptoms. Patient states they have been compliant with restrictions. Patient has been out of the sling without issue. No new injuries or complaints his last being seen. Patient states that physical therapy is going well and he feels he is progressing nicely. Physical:   Patient demonstrates appropriate mood and affect. Left upper extremity exam:   The incisions are clean, dry, intact and nontender with no erythema. Incisions have healed well without any concerning findings They have minimal edema, the Arm compartments are soft . There are No cords or calf tenderness. No significant calf/ankle edema. They are neurovascularly intact distally. Range of motion of the left shoulder demonstrates: Active forward flexion approximately 100 degrees, external rotation approximately 45 degrees, abduction approximately 80 degrees    Minimal tenderness to palpation over arthroscopic portals, slightly improved from last visit.   Continued pain over area of biceps tendon insertion      Imaging:   No new orthopedic imaging     Impression: Status post above, doing well        Plan:   -Continue discontinuation of sling  -Educated patient on exercises to work on range of motion as well as arm positions to avoid due to biceps tenodesis  -continue the PT protocol   -Protocol provided to physical therapy team previously  -rest/elevation as needed  -No refills for needed today  -f/u in 6 week(s)  -New work note provided to be off for 7 weeks and we will reassess in 6 weeks if this needs to be increased.   -f/u sooner prn any issues

## 2022-03-11 NOTE — PROGRESS NOTES
Patient is a 6 week post op left shoulder arthroscopic surgery. Patient states that he has been continuing with PT. Patient states that he has been seeing improvement with each PT session.  Patient hasn't had any other complaints

## 2022-03-11 NOTE — LETTER
[address] [place] [address] and Sports Medicine  [address]  Phone: [phone]  Fax: [phone]    Violetta Gonzalez DO        March 11, 2022     Patient: Leora Lam   YOB: 1965   Date of Visit: 3/11/2022       To Whom it May Concern:    Selam Hernandez was seen in my clinic on 3/11/2022. He may return to work on 05/02/2022. If you have any questions or concerns, please don't hesitate to call.     Sincerely,         Violetta Gonzalez, DO

## 2022-03-11 NOTE — PATIENT INSTRUCTIONS
Continue with physical therapy  Continue range of motion  Ice and elevate as needed  Tylenol or Motrin for pain  Follow up in 6 weeks

## 2022-03-15 ENCOUNTER — APPOINTMENT (OUTPATIENT)
Dept: CT IMAGING | Age: 57
End: 2022-03-15
Payer: COMMERCIAL

## 2022-03-15 ENCOUNTER — HOSPITAL ENCOUNTER (OUTPATIENT)
Dept: PHYSICAL THERAPY | Age: 57
Discharge: HOME | End: 2022-03-15

## 2022-03-15 ENCOUNTER — HOSPITAL ENCOUNTER (EMERGENCY)
Age: 57
Discharge: HOME | End: 2022-03-15
Payer: COMMERCIAL

## 2022-03-15 ENCOUNTER — APPOINTMENT (OUTPATIENT)
Dept: GENERAL RADIOLOGY | Age: 57
End: 2022-03-15
Payer: COMMERCIAL

## 2022-03-15 VITALS
SYSTOLIC BLOOD PRESSURE: 133 MMHG | DIASTOLIC BLOOD PRESSURE: 97 MMHG | HEART RATE: 101 BPM | RESPIRATION RATE: 16 BRPM | TEMPERATURE: 99.2 F | OXYGEN SATURATION: 98 %

## 2022-03-15 DIAGNOSIS — R52 GENERALIZED BODY ACHES: Primary | ICD-10-CM

## 2022-03-15 DIAGNOSIS — R91.8 PULMONARY NODULES: ICD-10-CM

## 2022-03-15 DIAGNOSIS — R07.89 CHEST PRESSURE: ICD-10-CM

## 2022-03-15 LAB
ALBUMIN SERPL-MCNC: 3.8 GM/DL (ref 3.4–5)
ALP BLD-CCNC: 94 IU/L (ref 40–129)
ALT SERPL-CCNC: 31 U/L (ref 10–40)
ANION GAP SERPL CALCULATED.3IONS-SCNC: 14 MMOL/L (ref 4–16)
AST SERPL-CCNC: 19 IU/L (ref 15–37)
BACTERIA: NEGATIVE /HPF
BASE EXCESS: 3 (ref 0–3.3)
BASOPHILS ABSOLUTE: 0 K/CU MM
BASOPHILS RELATIVE PERCENT: 0.2 % (ref 0–1)
BETA-HYDROXYBUTYRATE: >20.8 MG/DL (ref 0–3)
BILIRUB SERPL-MCNC: 0.4 MG/DL (ref 0–1)
BILIRUBIN URINE: ABNORMAL MG/DL
BLOOD, URINE: ABNORMAL
BUN BLDV-MCNC: 22 MG/DL (ref 6–23)
CALCIUM SERPL-MCNC: 9 MG/DL (ref 8.3–10.6)
CHLORIDE BLD-SCNC: 96 MMOL/L (ref 99–110)
CLARITY: CLEAR
CO2: 21 MMOL/L (ref 21–32)
COLOR: YELLOW
COMMENT: NORMAL
CREAT SERPL-MCNC: 0.9 MG/DL (ref 0.9–1.3)
DIFFERENTIAL TYPE: ABNORMAL
EOSINOPHILS ABSOLUTE: 0.1 K/CU MM
EOSINOPHILS RELATIVE PERCENT: 0.6 % (ref 0–3)
GFR AFRICAN AMERICAN: >60 ML/MIN/1.73M2
GFR NON-AFRICAN AMERICAN: >60 ML/MIN/1.73M2
GLUCOSE BLD-MCNC: 155 MG/DL (ref 70–99)
GLUCOSE, URINE: >1000 MG/DL
HCO3 VENOUS: 22.7 MMOL/L (ref 19–25)
HCT VFR BLD CALC: 49.2 % (ref 42–52)
HEMOGLOBIN: 16.5 GM/DL (ref 13.5–18)
IMMATURE NEUTROPHIL %: 0.6 % (ref 0–0.43)
KETONES, URINE: >80 MG/DL
LACTATE: 1.4 MMOL/L (ref 0.4–2)
LEUKOCYTE ESTERASE, URINE: NEGATIVE
LIPASE: 29 IU/L (ref 13–60)
LYMPHOCYTES ABSOLUTE: 1.2 K/CU MM
LYMPHOCYTES RELATIVE PERCENT: 13.3 % (ref 24–44)
MCH RBC QN AUTO: 30.3 PG (ref 27–31)
MCHC RBC AUTO-ENTMCNC: 33.5 % (ref 32–36)
MCV RBC AUTO: 90.4 FL (ref 78–100)
MONOCYTES ABSOLUTE: 0.9 K/CU MM
MONOCYTES RELATIVE PERCENT: 10.2 % (ref 0–4)
MUCUS: ABNORMAL HPF
NITRITE URINE, QUANTITATIVE: NEGATIVE
NUCLEATED RBC %: 0 %
O2 SAT, VEN: 53.2 % (ref 50–70)
PCO2, VEN: 42 MMHG (ref 38–52)
PDW BLD-RTO: 11.9 % (ref 11.7–14.9)
PH VENOUS: 7.34 (ref 7.32–7.42)
PH, URINE: 5 (ref 5–8)
PLATELET # BLD: 205 K/CU MM (ref 140–440)
PMV BLD AUTO: 9.1 FL (ref 7.5–11.1)
PO2, VEN: 32 MMHG (ref 28–48)
POTASSIUM SERPL-SCNC: 4.5 MMOL/L (ref 3.5–5.1)
PRO-BNP: 87.08 PG/ML
PROTEIN UA: 30 MG/DL
RAPID INFLUENZA  B AGN: NEGATIVE
RAPID INFLUENZA A AGN: NEGATIVE
RBC # BLD: 5.44 M/CU MM (ref 4.6–6.2)
RBC URINE: 1 /HPF (ref 0–3)
SARS-COV-2, NAAT: NOT DETECTED
SEGMENTED NEUTROPHILS ABSOLUTE COUNT: 6.7 K/CU MM
SEGMENTED NEUTROPHILS RELATIVE PERCENT: 75.1 % (ref 36–66)
SODIUM BLD-SCNC: 131 MMOL/L (ref 135–145)
SOURCE: NORMAL
SPECIFIC GRAVITY UA: >1.03 (ref 1–1.03)
SQUAMOUS EPITHELIAL: <1 /HPF
TOTAL IMMATURE NEUTOROPHIL: 0.05 K/CU MM
TOTAL NUCLEATED RBC: 0 K/CU MM
TOTAL PROTEIN: 6.8 GM/DL (ref 6.4–8.2)
TOTAL RETICULOCYTE COUNT: 0.1 K/CU MM
TRICHOMONAS: ABNORMAL /HPF
TROPONIN T: <0.01 NG/ML
UROBILINOGEN, URINE: NORMAL MG/DL (ref 0.2–1)
WBC # BLD: 8.9 K/CU MM (ref 4–10.5)
WBC UA: <1 /HPF (ref 0–2)

## 2022-03-15 PROCEDURE — 96374 THER/PROPH/DIAG INJ IV PUSH: CPT

## 2022-03-15 PROCEDURE — 6370000000 HC RX 637 (ALT 250 FOR IP): Performed by: PHYSICIAN ASSISTANT

## 2022-03-15 PROCEDURE — 71275 CT ANGIOGRAPHY CHEST: CPT

## 2022-03-15 PROCEDURE — 93005 ELECTROCARDIOGRAM TRACING: CPT | Performed by: PHYSICIAN ASSISTANT

## 2022-03-15 PROCEDURE — 83605 ASSAY OF LACTIC ACID: CPT

## 2022-03-15 PROCEDURE — 99282 EMERGENCY DEPT VISIT SF MDM: CPT

## 2022-03-15 PROCEDURE — 71045 X-RAY EXAM CHEST 1 VIEW: CPT

## 2022-03-15 PROCEDURE — 84484 ASSAY OF TROPONIN QUANT: CPT

## 2022-03-15 PROCEDURE — 80053 COMPREHEN METABOLIC PANEL: CPT

## 2022-03-15 PROCEDURE — 83690 ASSAY OF LIPASE: CPT

## 2022-03-15 PROCEDURE — 87635 SARS-COV-2 COVID-19 AMP PRB: CPT

## 2022-03-15 PROCEDURE — 6360000004 HC RX CONTRAST MEDICATION: Performed by: PHYSICIAN ASSISTANT

## 2022-03-15 PROCEDURE — 2580000003 HC RX 258: Performed by: PHYSICIAN ASSISTANT

## 2022-03-15 PROCEDURE — 81001 URINALYSIS AUTO W/SCOPE: CPT

## 2022-03-15 PROCEDURE — 87804 INFLUENZA ASSAY W/OPTIC: CPT

## 2022-03-15 PROCEDURE — 82010 KETONE BODYS QUAN: CPT

## 2022-03-15 PROCEDURE — 6360000002 HC RX W HCPCS: Performed by: PHYSICIAN ASSISTANT

## 2022-03-15 PROCEDURE — 83880 ASSAY OF NATRIURETIC PEPTIDE: CPT

## 2022-03-15 PROCEDURE — 82805 BLOOD GASES W/O2 SATURATION: CPT

## 2022-03-15 PROCEDURE — 85025 COMPLETE CBC W/AUTO DIFF WBC: CPT

## 2022-03-15 RX ORDER — 0.9 % SODIUM CHLORIDE 0.9 %
1000 INTRAVENOUS SOLUTION INTRAVENOUS ONCE
Status: COMPLETED | OUTPATIENT
Start: 2022-03-15 | End: 2022-03-15

## 2022-03-15 RX ORDER — ONDANSETRON 2 MG/ML
4 INJECTION INTRAMUSCULAR; INTRAVENOUS ONCE
Status: COMPLETED | OUTPATIENT
Start: 2022-03-15 | End: 2022-03-15

## 2022-03-15 RX ORDER — ACETAMINOPHEN 500 MG
1000 TABLET ORAL ONCE
Status: COMPLETED | OUTPATIENT
Start: 2022-03-15 | End: 2022-03-15

## 2022-03-15 RX ORDER — ONDANSETRON 4 MG/1
4 TABLET, ORALLY DISINTEGRATING ORAL EVERY 6 HOURS
Qty: 20 TABLET | Refills: 0 | Status: SHIPPED | OUTPATIENT
Start: 2022-03-15 | End: 2022-07-22

## 2022-03-15 RX ADMIN — ACETAMINOPHEN 1000 MG: 500 TABLET ORAL at 19:34

## 2022-03-15 RX ADMIN — ONDANSETRON 4 MG: 2 INJECTION INTRAMUSCULAR; INTRAVENOUS at 19:34

## 2022-03-15 RX ADMIN — IOPAMIDOL 85 ML: 755 INJECTION, SOLUTION INTRAVENOUS at 20:49

## 2022-03-15 RX ADMIN — SODIUM CHLORIDE 1000 ML: 0.9 INJECTION, SOLUTION INTRAVENOUS at 19:36

## 2022-03-15 ASSESSMENT — PAIN SCALES - GENERAL: PAINLEVEL_OUTOF10: 0

## 2022-03-15 NOTE — FLOWSHEET NOTE
Physical Therapy  Cancellation/No-show Note  Patient Name:  Wesley Austin  :  1965   Date:  3/15/2022  Cancelled visits to date: 1  No-shows to date: 0    For today's appointment patient:  [x]  Cancelled  []  Rescheduled appointment  []  No-show     Reason given by patient:  []  Patient ill  []  Conflicting appointment  []  No transportation    []  Conflict with work  [x]  No reason given  []  Other:     Comments:      Electronically signed by:  Mable Melvin     3/15/2022,12:51 PM

## 2022-03-15 NOTE — ED PROVIDER NOTES
right     SEPTOPLASTY  1990's    SHOULDER ARTHROSCOPY Left 1/31/2022    LEFT SHOULDER ARTHROSCOPY ROTATOR CUFF DEBRIDMENT VERSUS REPAIR, SUBACROMIAL DECOMPRESSION, DISTAL CLAVICLE RESECTION, BICEPS TENODESIS performed by Roxana Bansal DO at [address] [place] [address] Marital status:      Spouse name: Not on file    Number of children: Not on file    Years of education: Not on file    Highest education level: Not on file   Occupational History    Occupation: Truck Movers     Comment:     Occupation: 2001 [employer] Occupation: [employer]     Comment: since 4/2019   Tobacco Use    Smoking status: Former Smoker     Packs/day: 0.50     Years: 10.00     Pack years: 5.00     Types: Cigarettes    Smokeless tobacco: Former User     Types: Chew    Tobacco comment: E Cig on regular basis   Vaping Use    Vaping Use: Never used   Substance and Sexual Activity    Alcohol use: Yes     Alcohol/week: 0.0 standard drinks     Comment: Occasional     Drug use: Never    Sexual activity: Yes     Partners: Female   Other Topics Concern    Not on file   Social History Narrative    Diet: unrestricted    Exercise: A lot    Seat Belt: Mostly     Social Determinants of Health     Financial Resource Strain:     Difficulty of Paying Living Expenses: Not on file   Food Insecurity:     Worried About Running Out of Food in the Last Year: Not on file    Daquan of Food in the Last Year: Not on file   Transportation Needs:     Lack of Transportation (Medical): Not on file    Lack of Transportation (Non-Medical):  Not on file   Physical Activity:     Days of Exercise per Week: Not on file    Minutes of Exercise per Session: Not on file   Stress:     Feeling of Stress : Not on file   Social Connections:     Frequency of Communication with Friends and Family: Not on file    Frequency of Social Gatherings with Friends and Family: Not on file    Attends Congregational Services: Not on file   [facility] [employer] or Organizations: Not on file    Attends Club or Organization Meetings: Not on file    Marital Status: Not on file   Intimate Partner Violence:     Fear of Current or Ex-Partner: Not on file    Emotionally Abused: Not on file    Physically Abused: Not on file    Sexually Abused: Not on file   Housing Stability:     Unable to Pay for Housing in the Last Year: Not on file    Number of Clairemojenn in the Last Year: Not on file    Unstable Housing in the Last Year: Not on file       Medications/Allergies     Previous Medications    ASPIRIN (ASPIRIN CHILDRENS) 81 MG CHEWABLE TABLET    Take 1 tablet by mouth daily    EMPAGLIFLOZIN (JARDIANCE) 10 MG TABLET    Take 1 tablet by mouth daily    GLIPIZIDE (GLUCOTROL) 5 MG TABLET    Take 1 tablet by mouth daily    LISINOPRIL (PRINIVIL;ZESTRIL) 2.5 MG TABLET    Take 1 tablet by mouth daily    ONDANSETRON (ZOFRAN) 4 MG TABLET    Take 1 tablet by mouth 3 times daily as needed for Nausea or Vomiting    ROSUVASTATIN (CRESTOR) 5 MG TABLET    Take 1 tablet by mouth nightly     Allergies   Allergen Reactions    Nsaids Other (See Comments)     Has diabetic nephropathy    Metformin And Related Diarrhea        Physical Exam       ED Triage Vitals   BP Temp Temp Source Pulse Resp SpO2 Height Weight   03/15/22 1718 03/15/22 1734 03/15/22 1734 03/15/22 1718 03/15/22 1718 03/15/22 1718 -- --   (!) 167/105 99.2 °F (37.3 °C) Oral 99 16 97 %       GENERAL APPEARANCE:  Well-developed, well-nourished, no acute distress. HEAD:  NC/AT. EYES:  Sclera anicteric. ENT:  Ears, nose, mouth normal.     NECK:  Supple. CARDIO:  RRR. LUNGS:   CTAB. Respirations unlabored. ABDOMEN:  Soft, non-distended, non-tender. BS active. EXTREMITIES:  No acute deformities. SKIN:  Warm and dry. NEUROLOGICAL:  Alert and oriented. PSYCHIATRIC:  Normal mood.      Diagnostics     Labs:  Labs Reviewed   CBC WITH AUTO DIFFERENTIAL - Abnormal; Notable for the following components:       Result Value    Segs Relative 75.1 (*)     Lymphocytes % 13.3 (*)     Monocytes % 10.2 (*)     Immature Neutrophil % 0.6 (*)     All other components within normal limits   COMPREHENSIVE METABOLIC PANEL W/ REFLEX TO MG FOR LOW K - Abnormal; Notable for the following components:    Sodium 131 (*)     Chloride 96 (*)     Glucose 155 (*)     All other components within normal limits   URINALYSIS WITH REFLEX TO CULTURE - Abnormal; Notable for the following components:    Glucose, Urine >1,000 (*)     Bilirubin Urine SMALL (*)     Ketones, Urine >80 (*)     Blood, Urine TRACE (*)     Protein, UA 30 (*)     Mucus, UA RARE (*)     All other components within normal limits   BETA-HYDROXYBUTYRATE - Abnormal; Notable for the following components:    Beta-Hydroxybutyrate >20.8 (*)     All other components within normal limits   RAPID INFLUENZA A/B ANTIGENS   COVID-19, RAPID   LIPASE   TROPONIN   BRAIN NATRIURETIC PEPTIDE   LACTIC ACID   BLOOD GAS, VENOUS     Radiographs:  XR CHEST PORTABLE    Result Date: 3/15/2022  EXAMINATION: ONE XRAY VIEW OF THE CHEST 3/15/2022 7:13 pm COMPARISON: None. HISTORY: ORDERING SYSTEM PROVIDED HISTORY: cp, body aches TECHNOLOGIST PROVIDED HISTORY: Reason for exam:->cp, body aches Reason for Exam: cp, body aches Additional signs and symptoms: NA Relevant Medical/Surgical History: DIABETES, HYPERTENSION FINDINGS: The cardiomediastinal silhouette is mildly enlarged. Lung volumes are low. No evidence of acute infiltrate. Blunting of the left costophrenic angle, could represent a small pleural effusion. No pneumothorax is identified. 1. Mild cardiomegaly without failure. 2. Blunting of the left costophrenic angle which could represent a trace pleural effusion or pleural scarring. PA and lateral chest may be helpful for further evaluation.      CTA PULMONARY W CONTRAST    Result Date: 3/15/2022  EXAMINATION: CTA OF THE CHEST 3/15/2022 5:48 pm TECHNIQUE: CTA of the chest was performed after the administration of intravenous contrast.  Multiplanar reformatted images are provided for review. MIP images are provided for review. Dose modulation, iterative reconstruction, and/or weight based adjustment of the mA/kV was utilized to reduce the radiation dose to as low as reasonably achievable. COMPARISON: None. HISTORY: ORDERING SYSTEM PROVIDED HISTORY: further eval from cxr TECHNOLOGIST PROVIDED HISTORY: Reason for exam:->further eval from cxr Decision Support Exception - unselect if not a suspected or confirmed emergency medical condition->Emergency Medical Condition (MA) Reason for Exam: further eval from cxr Additional signs and symptoms: Pain all over since Saturday FINDINGS: Pulmonary Arteries: Pulmonary arteries are adequately opacified for evaluation. No evidence of intraluminal filling defect to suggest pulmonary embolism. Main pulmonary artery is normal in caliber. Mediastinum: Visualized thyroid is unremarkable. The no mediastinal or hilar lymphadenopathy is identified. Esophagus is unremarkable. Cardiac chambers unremarkable. No pericardial effusion. Thoracic aorta normal in caliber. No evidence of dissection. Lungs/pleura: Within the periphery of the left upper lobe anteriorly, there is a mildly spiculated 7 mm nodule. Additional smaller 3-4 mm Rona fissural nodules are identified within the right middle lobe (for example as seen on sagittal image 52 and 55). No acute infiltrate. No pneumothorax. No pleural effusion. Upper Abdomen: Diffuse hepatic steatosis is noted. Images of the upper abdomen are otherwise unremarkable. Soft Tissues/Bones: Visualized extra thoracic soft tissues unremarkable. No acute or suspicious bony abnormalities are identified. No evidence of pulmonary embolism or aortic dissection. Overall, no acute abnormality detected. Multiple nodules, largest in the left upper lobe peripherally measuring 7 mm.  A follow-up chest CT in 3-6 months is recommended to ensure stability. Procedures/EKG:   Please see attending physician's note for interpretation. ED Course and MDM   -  Patient seen and evaluated in the emergency department. -  Triage and nursing notes reviewed and incorporated. -  Old chart records reviewed and incorporated. -  Supervising physician was Dr. Shola Alicia. Patient was seen independently. -  Work-up included:  See above  -  ED medications:  NS, Zofran, Tylenol  -  Results discussed with patient and wife. COVID and flu are negative. CXR/CTA with mild cardiomegaly, pulmonary nodules--discussed FU--but no acute findings. His beta-hydroxy is >20.8 but normal VBG, CO2, anion gap. Ketones in urine as well. No UTI. Remaining labs are reassuring. He does feel better after fluids. We discussed findings could be result of dehydration and I suspect possible viral syndrome. Do encourage continued hydration, rest, monitor for new/worsening symptoms. FU with PCP as discussed or return here as needed. He is agreeable with plan of care and disposition.  -  Disposition:  Home    In light of current events, I did utilize appropriate PPE (including N95 and surgical face mask, safety glasses, and gloves, as recommended by the health facility/national standard best practice, during my bedside interactions with the patient. Final Impression      1. Generalized body aches    2. Chest pressure    3.  Pulmonary nodules            DISPOSITION        Anthony Ricardo PA-C  [address] [place] [address]  03/16/22 4030

## 2022-03-15 NOTE — ED TRIAGE NOTES
EMS reports that pt called for SOB since Saturday and pain all over. Pt was picked up from urgent care and ambulated to cot unassisted with no issues.

## 2022-03-16 LAB
EKG ATRIAL RATE: 77 BPM
EKG DIAGNOSIS: NORMAL
EKG P AXIS: 58 DEGREES
EKG P-R INTERVAL: 160 MS
EKG Q-T INTERVAL: 374 MS
EKG QRS DURATION: 100 MS
EKG QTC CALCULATION (BAZETT): 423 MS
EKG R AXIS: -2 DEGREES
EKG T AXIS: 59 DEGREES
EKG VENTRICULAR RATE: 77 BPM

## 2022-03-16 PROCEDURE — 93010 ELECTROCARDIOGRAM REPORT: CPT | Performed by: INTERNAL MEDICINE

## 2022-03-18 ENCOUNTER — HOSPITAL ENCOUNTER (OUTPATIENT)
Dept: PHYSICAL THERAPY | Age: 57
Setting detail: THERAPIES SERIES
Discharge: HOME | End: 2022-03-18
Payer: COMMERCIAL

## 2022-03-18 PROCEDURE — 97016 VASOPNEUMATIC DEVICE THERAPY: CPT

## 2022-03-18 PROCEDURE — 97140 MANUAL THERAPY 1/> REGIONS: CPT

## 2022-03-18 PROCEDURE — 97110 THERAPEUTIC EXERCISES: CPT

## 2022-03-18 NOTE — FLOWSHEET NOTE
Outpatient Physical Therapy  [place]           [x] Phone: [phone]   Fax: [phone]  [facility]           [] Phone: [phone]   Fax: [phone]        Physical Therapy Daily Treatment Note  Date:  3/18/2022    Patient Name:  Rio Lopez    :  1965  MRN: [id number]  Restrictions/Precautions: limit ER to 40 deg (4 weeks), no extension past body (4 weeks), no shoulder motion behind the back, no closed chain *See folder for Samson protocol*  Diagnosis:   Diagnosis: L shoulder surg (biceps tenodesis without RC repair)  Date of Injury/Surgery: 22  Treatment Diagnosis: Treatment Diagnosis: Decreased LUE strength and mobility, limtied by pain    Insurance/Certification information: PT Insurance Information: [employer] $20 co-pay  Referring Physician:  Referring Practitioner: Dr. Fide Thurston  Next Doctor Visit:  --  Plan of care signed (Y/N):  YES, sent 22  Outcome Measure: Quick DASH: 77%  Visit# / total visits:     Pain level: 0/10   Goals:     Patient goals : improve ability to return to work and golf  Short term goals  Time Frame for Short term goals: 5 weeks  Short term goal 1: Pt demo I w/ HEP and pain management reports good compliance  Short term goal 2: Pt demo Quick DASH score improvement by 25% to increase ability to return to work duties  Short term goal 3: Pt demo WNL shoulder PROM in all directions with pain <1/10 to improve overhead reaching  Short term goal 4: Pt demo >4/5 shoulder and elbow strength in all directions and pain reported <1/10 to facilitate return to work  Long term goals  Time Frame for Long term goals : 10 weeks  Long term goal 1: Pt demo ability to reach overhead x10 with 3 lbs to mimic reaching into an overhead cabinet  Long term goal 2: Pt demo WNL shoulder AROM in all directions with pain <1/10 to improve ability to compelte his work duties  Long term goal 3: Pt demo 5/5 shoulder and elbow strength in all directions and pain reported <1/10 to facilitate return to work    Summary of Evaluation:  Pt is 49year old male who underwent a biceps tenodesis, rotator cuff debridement, and distal clavicle resection on 1/31/22. Pt now has difficulties reaching/lifting his arm, washing his back, shaving his head, toilet hygiene, work duties, and golfing. Pt demo deficits this date that include reduced shoulder/elbow ROM, reduced rotator cuff/scapular/biceps strength, decreased tolerance to active movement, WNL sensation, ongoing pain with rest/activity. Pt will benefit with PT services with shoulder/elbow PROM/AAROM/AROM, progressive rotator cuff/scapular/biceps strengthening, GH mobilizations, modalities as needed for pain management, manual/STM per protocol to return to PLOF. Pt prior to onset of current condition had no pain with able to complete full ADLs and work activities. Patient received education on their current pathology and how their condition effects them with their functional activities. Patient understood discussion and questions were answered. Patient understands their activity limitations and understands rational for treatment progression. Subjective:Pt stated that he wasn't having any pain currently, but with stretching it can increase to 8/10. Any changes in Ambulatory Summary Sheet?   None      Objective:    COVID screening questions were asked and patient attested that there had been no contact or symptoms    Shoulder ROM:  143°flexion (PROM)  98° abduction (PROM)  15° ER      Exercises: (No more than 4 columns)   Exercise/Equipment 3/3/22 #4 3/8/2022 #5 3/10/22 #6 3/18/2022 # 7            WARM UP          Laith's   x15 scaption x20          TABLE       PROM of elbow/shoulder Man see below  Man AROM flex/ext, sup/pro  x10 ea dir without weight     x10 bicep curls 1# Bicep curl 1# 10x2   *Wrist AROM --      *Ball Squeezes --      *Pendulums  x10      *Scap Squeezes    2x10 5\" 10x 2 5\"  10x2 5\"    Cane Seated ER x10 3\" within pain tolerance   [id number] 3\" x15 5\" Seated 10x2 5\"    Supine Cane Flexion 2x5 within pain tolerance 10x 3\"  x15 3\" 10x2 5\" w/ cane   Seated table slides in flexion  10x2 3\"  --    ER isometric   x10 5\" into wall    x10 5\" into therapist hand 10x2 5\" into ball on wall          STANDING       Roller on Wall   x10 3\" 10x2 3\"                                                   PROPRIOCEPTION                                          MODALITIES       vaso 10'  10'               Other Therapeutic Activities/Education:  Patient received education on their current pathology and how their condition effects them with their functional activities. Patient understood discussion and questions were answered. Patient understands their activity limitations and understands rational for treatment progression. Home Exercise Program:  HO issued, reviewed and discussed with patient. Pt agreed to comply. Manual Treatments:  PROM ER to tolerance x10'       Modalities:  Gameready to L shoulder/elbow complex in sitting, low pressure, 34 deg x15' for pain management. No adverse effects. Communication with other providers:  eval sent 2/22/22      Assessment:  Pt tolerated treatment fair today. Pt was able to get more PROM after manual treatment today. Pt rated pain at 0/10 after vaso. Pt reported that pain increases to 8/10 with stretch on pulleys or manually, but that it dissipates after stretch is removed. Pt will continue to benefit from more ROM and strengthening as able. Pt is 49year old male who underwent a biceps tenodesis, rotator cuff debridement, and distal clavicle resection on 1/31/22. Pt now has difficulties reaching/lifting his arm, washing his back, shaving his head, toilet hygiene, work duties, and golfing. Pt demo deficits this date that include reduced shoulder/elbow ROM, reduced rotator cuff/scapular/biceps strength, decreased tolerance to active movement, WNL sensation, ongoing pain with rest/activity. Pt will benefit with PT services with shoulder/elbow PROM/AAROM/AROM, progressive rotator cuff/scapular/biceps strengthening, GH mobilizations, modalities as needed for pain management, manual/STM per protocol to return to PLOF. Pt prior to onset of current condition had no pain with able to complete full ADLs and work activities. Patient received education on their current pathology and how their condition effects them with their functional activities. Patient understood discussion and questions were answered. Patient understands their activity limitations and understands rational for treatment progression.        Plan for Next Session:  --      Time In / Time Out: 1300/1355      Timed Code/Total Treatment Minutes:  45'/ 54'       2 TE (30') 1 Man (15') 1 vaso (10')     Next Progress Note due: 10th visit       Plan of Care Interventions:  [x] Therapeutic Exercise  [] Modalities:  [x] Therapeutic Activity     [] Ultrasound  [] Estim  [x] Gait Training      [] Cervical Traction [] Lumbar Traction  [x] Neuromuscular Re-education    [] Cold/hotpack [] Iontophoresis   [x] Instruction in HEP      [x] Vasopneumatic   [] Dry Needling    [x] Manual Therapy               [] Aquatic Therapy              Electronically signed by:Zonia Williamson , DEBBIE    3/18/2022,12:47 PM     3/18/2022,2:14 PM

## 2022-03-21 ENCOUNTER — HOSPITAL ENCOUNTER (OUTPATIENT)
Dept: PHYSICAL THERAPY | Age: 57
Setting detail: THERAPIES SERIES
Discharge: HOME | End: 2022-03-21
Payer: COMMERCIAL

## 2022-03-21 PROCEDURE — 97016 VASOPNEUMATIC DEVICE THERAPY: CPT

## 2022-03-21 PROCEDURE — 97110 THERAPEUTIC EXERCISES: CPT

## 2022-03-21 PROCEDURE — 97140 MANUAL THERAPY 1/> REGIONS: CPT

## 2022-03-21 NOTE — FLOWSHEET NOTE
Outpatient Physical Therapy  [place]           [x] Phone: [phone]   Fax: [phone]  [place]           [] Phone: [phone]   Fax: [phone]        Physical Therapy Daily Treatment Note  Date:  3/21/2022    Patient Name:  Rubens Phillips    :  1965  MRN: [id number]  Restrictions/Precautions: limit ER to 40 deg (4 weeks), no extension past body (4 weeks), no shoulder motion behind the back, no closed chain *See folder for Samson protocol*  Diagnosis:   Diagnosis: L shoulder surg (biceps tenodesis without RC repair)  Date of Injury/Surgery: 22  Treatment Diagnosis: Treatment Diagnosis: Decreased LUE strength and mobility, limtied by pain    Insurance/Certification information: PT Insurance Information: [employer] $20 co-pay  Referring Physician:  Referring Practitioner: Dr. Donaldo Cruz  Next Doctor Visit:  --  Plan of care signed (Y/N):  YES, sent 22  Outcome Measure: Quick DASH: 77%  Visit# / total visits:     Pain level: 210   Goals:     Patient goals : improve ability to return to work and golf  Short term goals  Time Frame for Short term goals: 5 weeks  Short term goal 1: Pt demo I w/ HEP and pain management reports good compliance  Short term goal 2: Pt demo Quick DASH score improvement by 25% to increase ability to return to work duties  Short term goal 3: Pt demo WNL shoulder PROM in all directions with pain <1/10 to improve overhead reaching  Short term goal 4: Pt demo >4/5 shoulder and elbow strength in all directions and pain reported <1/10 to facilitate return to work  Long term goals  Time Frame for Long term goals : 10 weeks  Long term goal 1: Pt demo ability to reach overhead x10 with 3 lbs to mimic reaching into an overhead cabinet  Long term goal 2: Pt demo WNL shoulder AROM in all directions with pain <1/10 to improve ability to compelte his work duties  Long term goal 3: Pt demo 5/5 shoulder and elbow strength in all directions and pain reported <1/10 to facilitate return to work    Summary of Evaluation:  Pt is 49year old male who underwent a biceps tenodesis, rotator cuff debridement, and distal clavicle resection on 1/31/22. Pt now has difficulties reaching/lifting his arm, washing his back, shaving his head, toilet hygiene, work duties, and golfing. Pt demo deficits this date that include reduced shoulder/elbow ROM, reduced rotator cuff/scapular/biceps strength, decreased tolerance to active movement, WNL sensation, ongoing pain with rest/activity. Pt will benefit with PT services with shoulder/elbow PROM/AAROM/AROM, progressive rotator cuff/scapular/biceps strengthening, GH mobilizations, modalities as needed for pain management, manual/STM per protocol to return to PLOF. Pt prior to onset of current condition had no pain with able to complete full ADLs and work activities. Patient received education on their current pathology and how their condition effects them with their functional activities. Patient understood discussion and questions were answered. Patient understands their activity limitations and understands rational for treatment progression. Subjective: Pt reports feeling a weight on his chest and breathing was labored, throwing up last Saturday. He went to urgent care and sent him to the emergency room. Attributed it to dehydration. Saw Dr. Yumiko Holguin last week who was happy with his progress. Any changes in Ambulatory Summary Sheet?   None      Objective:    COVID screening questions were asked and patient attested that there had been no contact or symptoms    Shoulder ROM:  143°flexion (PROM)  98° abduction (PROM)  17° ER    Needs to be able to lift 30# overhead for work       Exercises: (No more than 4 columns)   Exercise/Equipment 3/10/22 #6 3/18/2022 # 7 3/21/22 #8           WARM UP         Laith's x15 scaption x20 x20         TABLE      PROM of elbow/shoulder AROM flex/ext, sup/pro  x10 ea dir without weight     x10 bicep curls 1# Bicep curl 1# 10x2 Biceps curl #2 2x10   *Wrist AROM      *Ball Squeezes      *Pendulums       *Scap Squeezes     10x2 5\"  YTB 2x10   Cane Seated ER x15 5\" Seated 10x2 5\"  2x10 5\"   Supine Cane Flexion x15 3\" 10x2 5\" w/ cane 2x10 5\" w/ cane  Try weighted bar next    Seated table slides in flexion --     ER isometric x10 5\" into wall    x10 5\" into therapist hand 10x2 5\" into ball on wall 2x10 5\"   SL Flex/ABD   next   STANDING      Roller on Wall x10 3\" 10x2 3\"  2x10 3\"   TRX Walk-Outs   2x10   Scaption Raise   2x10 no weight    Triceps Extension TB                             PROPRIOCEPTION                                    MODALITIES      vaso 10' 10' 10'             Other Therapeutic Activities/Education:  Patient received education on their current pathology and how their condition effects them with their functional activities. Patient understood discussion and questions were answered. Patient understands their activity limitations and understands rational for treatment progression. Home Exercise Program:  HO issued, reviewed and discussed with patient. Pt agreed to comply. Manual Treatments:  PROM ER to tolerance x10'       Modalities:  Gameready to L shoulder/elbow complex in sitting, low pressure, 34 deg x15' for pain management. No adverse effects. Communication with other providers:  gamaliel sent 2/22/22      Assessment:  Pt tolerated treatment good today. He is showing slow improvements in shoulder external rotation. Would like to return to work at the beginning of May. Begin progressing rotator cuff and scapular strength. End of session: 2/10       Pt is 49year old male who underwent a biceps tenodesis, rotator cuff debridement, and distal clavicle resection on 1/31/22. Pt now has difficulties reaching/lifting his arm, washing his back, shaving his head, toilet hygiene, work duties, and golfing.  Pt demo deficits this date that include reduced shoulder/elbow ROM, reduced rotator cuff/scapular/biceps strength, decreased tolerance to active movement, WNL sensation, ongoing pain with rest/activity. Pt will benefit with PT services with shoulder/elbow PROM/AAROM/AROM, progressive rotator cuff/scapular/biceps strengthening, GH mobilizations, modalities as needed for pain management, manual/STM per protocol to return to PLOF. Pt prior to onset of current condition had no pain with able to complete full ADLs and work activities. Patient received education on their current pathology and how their condition effects them with their functional activities. Patient understood discussion and questions were answered. Patient understands their activity limitations and understands rational for treatment progression.        Plan for Next Session:  --      Time In / Time Out: 5667-8009      Timed Code/Total Treatment Minutes:  45'/ 54'       2 TE (35') 1 Man (10') 1 vaso (10')     Next Progress Note due: 10th visit       Plan of Care Interventions:  [x] Therapeutic Exercise  [] Modalities:  [x] Therapeutic Activity     [] Ultrasound  [] Estim  [x] Gait Training      [] Cervical Traction [] Lumbar Traction  [x] Neuromuscular Re-education    [] Cold/hotpack [] Iontophoresis   [x] Instruction in HEP      [x] Vasopneumatic   [] Dry Needling    [x] Manual Therapy               [] Aquatic Therapy              Electronically signed by:Jailyn Villanueva, PT , DPT, CSCS    3/21/2022,9:04 AM  3/21/2022,9:04 AM

## 2022-03-23 ENCOUNTER — HOSPITAL ENCOUNTER (OUTPATIENT)
Dept: PHYSICAL THERAPY | Age: 57
Setting detail: THERAPIES SERIES
Discharge: HOME | End: 2022-03-23
Payer: COMMERCIAL

## 2022-03-23 PROCEDURE — 97016 VASOPNEUMATIC DEVICE THERAPY: CPT

## 2022-03-23 PROCEDURE — 97110 THERAPEUTIC EXERCISES: CPT

## 2022-03-23 PROCEDURE — 97140 MANUAL THERAPY 1/> REGIONS: CPT

## 2022-03-23 NOTE — FLOWSHEET NOTE
Outpatient Physical Therapy  [place]           [x] Phone: [phone]   Fax: [phone]  [facility]           [] Phone: [phone]   Fax: [phone]        Physical Therapy Daily Treatment Note  Date:  3/23/2022    Patient Name:  Khadijah Pimentel    :  1965  MRN: [id number]  Restrictions/Precautions: limit ER to 40 deg (4 weeks), no extension past body (4 weeks), no shoulder motion behind the back, no closed chain *See folder for [facility] protocol*  Diagnosis:   Diagnosis: L shoulder surg (biceps tenodesis without RC repair)  Date of Injury/Surgery: 22  Treatment Diagnosis: Treatment Diagnosis: Decreased LUE strength and mobility, limtied by pain    Insurance/Certification information: PT Insurance Information: [employer] $20 co-pay  Referring Physician:  Referring Practitioner: Dr. Xiao Bender  Next Doctor Visit:  --  Plan of care signed (Y/N):  YES, sent 22  Outcome Measure: Quick DASH: 77%  Visit# / total visits:     Pain level: 5/10   Goals:     Patient goals : improve ability to return to work and golf  Short term goals  Time Frame for Short term goals: 5 weeks  Short term goal 1: Pt demo I w/ HEP and pain management reports good compliance  Short term goal 2: Pt demo Quick DASH score improvement by 25% to increase ability to return to work duties  Short term goal 3: Pt demo WNL shoulder PROM in all directions with pain <1/10 to improve overhead reaching  Short term goal 4: Pt demo >4/5 shoulder and elbow strength in all directions and pain reported <1/10 to facilitate return to work  Long term goals  Time Frame for Long term goals : 10 weeks  Long term goal 1: Pt demo ability to reach overhead x10 with 3 lbs to mimic reaching into an overhead cabinet  Long term goal 2: Pt demo WNL shoulder AROM in all directions with pain <1/10 to improve ability to compelte his work duties  Long term goal 3: Pt demo 5/5 shoulder and elbow strength in all directions and pain reported <1/10 to facilitate return to work    Summary of Evaluation:  Pt is 49year old male who underwent a biceps tenodesis, rotator cuff debridement, and distal clavicle resection on 1/31/22. Pt now has difficulties reaching/lifting his arm, washing his back, shaving his head, toilet hygiene, work duties, and golfing. Pt demo deficits this date that include reduced shoulder/elbow ROM, reduced rotator cuff/scapular/biceps strength, decreased tolerance to active movement, WNL sensation, ongoing pain with rest/activity. Pt will benefit with PT services with shoulder/elbow PROM/AAROM/AROM, progressive rotator cuff/scapular/biceps strengthening, GH mobilizations, modalities as needed for pain management, manual/STM per protocol to return to PLOF. Pt prior to onset of current condition had no pain with able to complete full ADLs and work activities. Patient received education on their current pathology and how their condition effects them with their functional activities. Patient understood discussion and questions were answered. Patient understands their activity limitations and understands rational for treatment progression. Subjective: Pt stated that his L shoulder pain was 5/10 today. Any changes in Ambulatory Summary Sheet?   None      Objective:    COVID screening questions were asked and patient attested that there had been no contact or symptoms    Shoulder ROM:  150°flexion (PROM)  105° abduction (PROM)  15° ER    Needs to be able to lift 30# overhead for work       Exercises: (No more than 4 columns)   Exercise/Equipment 3/10/22 #6 3/18/2022 # 7 3/21/22 #8 3/23/2022 #9            WARM UP          Laith's x15 scaption x20 x20 20x          TABLE       PROM of elbow/shoulder AROM flex/ext, sup/pro  x10 ea dir without weight     x10 bicep curls 1# Bicep curl 1# 10x2 Biceps curl #2 2x10 Bicep curl 1# 10x2   *Wrist AROM       *Ball Squeezes       *Pendulums        *Scap Squeezes     10x2 5\"  YTB 2x10 YTB 10x2   Cane Seated ER x15 5\" Seated 10x2 5\"  2x10 5\" Seated 10x2 5\"    Supine Cane Flexion x15 3\" 10x2 5\" w/ cane 2x10 5\" w/ cane  Try weighted bar next  1# bar 10x2   Seated table slides in flexion --      Prone extension    10x2   ER isometric x10 5\" into wall    x10 5\" into therapist hand 10x2 5\" into ball on wall 2x10 5\"    SL Flex/ABD   next AA 10x2 ea dir   STANDING       Roller on Wall x10 3\" 10x2 3\"  2x10 3\" 10x2 3\"    TRX Walk-Outs   2x10 15x 5\"    Scaption Raise   2x10 no weight  --   Triceps Extension TB       Standing SB stretch into flexion    10x2 GSB                       PROPRIOCEPTION                                          MODALITIES       vaso 10' 10' 10' 10'               Other Therapeutic Activities/Education:  Patient received education on their current pathology and how their condition effects them with their functional activities. Patient understood discussion and questions were answered. Patient understands their activity limitations and understands rational for treatment progression. Home Exercise Program:  HO issued, reviewed and discussed with patient. Pt agreed to comply. Manual Treatments:  PROM in all directions '       Modalities:  Gameready to L shoulder/elbow complex in sitting, low pressure, 34 deg x15' for pain management. No adverse effects. Communication with other providers:  joséal sent 2/22/22      Assessment:  Pt tolerated treatment fair. Pt is making gains with ROM,but continues to be really tight in shoulder ROM especially ER. Pt rated pain at 2/10 after vaso. Pt is 49year old male who underwent a biceps tenodesis, rotator cuff debridement, and distal clavicle resection on 1/31/22. Pt now has difficulties reaching/lifting his arm, washing his back, shaving his head, toilet hygiene, work duties, and golfing.  Pt demo deficits this date that include reduced shoulder/elbow ROM, reduced rotator cuff/scapular/biceps strength, decreased tolerance to active movement, WNL sensation, ongoing pain with rest/activity. Pt will benefit with PT services with shoulder/elbow PROM/AAROM/AROM, progressive rotator cuff/scapular/biceps strengthening, GH mobilizations, modalities as needed for pain management, manual/STM per protocol to return to PLOF. Pt prior to onset of current condition had no pain with able to complete full ADLs and work activities. Patient received education on their current pathology and how their condition effects them with their functional activities. Patient understood discussion and questions were answered. Patient understands their activity limitations and understands rational for treatment progression.        Plan for Next Session:  --      Time In / Time Out: 0955/1100      Timed Code/Total Treatment Minutes:  55'/65'       2 TE (35') 1 Man (23') 1 vaso (10')     Next Progress Note due: 10th visit       Plan of Care Interventions:  [x] Therapeutic Exercise  [] Modalities:  [x] Therapeutic Activity     [] Ultrasound  [] Estim  [x] Gait Training      [] Cervical Traction [] Lumbar Traction  [x] Neuromuscular Re-education    [] Cold/hotpack [] Iontophoresis   [x] Instruction in HEP      [x] Vasopneumatic   [] Dry Needling    [x] Manual Therapy               [] Aquatic Therapy              Electronically signed by:Zonia Mcfarland ,DEBBIE  3/23/2022,9:10 AM     3/23/2022,3:13 PM

## 2022-04-04 ENCOUNTER — HOSPITAL ENCOUNTER (OUTPATIENT)
Dept: PHYSICAL THERAPY | Age: 57
Setting detail: THERAPIES SERIES
Discharge: HOME | End: 2022-04-04
Payer: COMMERCIAL

## 2022-04-04 PROCEDURE — 97530 THERAPEUTIC ACTIVITIES: CPT

## 2022-04-04 PROCEDURE — 97110 THERAPEUTIC EXERCISES: CPT

## 2022-04-04 PROCEDURE — 97016 VASOPNEUMATIC DEVICE THERAPY: CPT

## 2022-04-04 NOTE — FLOWSHEET NOTE
Outpatient Physical Therapy  [place]           [x] Phone: [phone]   Fax: [phone]  [facility]           [] Phone: [phone]   Fax: [phone]        Physical Therapy Daily Treatment Note  Date:  2022    Patient Name:  Omar Meek    :  1965  MRN: [id number]  Restrictions/Precautions: limit ER to 40 deg (4 weeks), no extension past body (4 weeks), no shoulder motion behind the back, no closed chain *See folder for [facility] protocol*  Diagnosis:   Diagnosis: L shoulder surg (biceps tenodesis without RC repair)  Date of Injury/Surgery: 22  Treatment Diagnosis: Treatment Diagnosis: Decreased LUE strength and mobility, limtied by pain    Insurance/Certification information: PT Insurance Information: [employer] $20 co-pay  Referring Physician:  Referring Practitioner: Dr. Darlene Espino  Next Doctor Visit:  --  Plan of care signed (Y/N):  YES, sent 22  Outcome Measure: Quick DASH: 77%  Visit# / total visits:   10/12  Pain level: 5/10   Goals:     Patient goals : improve ability to return to work and golf  Short term goals  Time Frame for Short term goals: 5 weeks  Short term goal 1: Pt demo I w/ HEP and pain management reports good compliance  Short term goal 2: Pt demo Quick DASH score improvement by 25% to increase ability to return to work duties MET  Short term goal 3: Pt demo WNL shoulder PROM in all directions with pain <1/10 to improve overhead reaching Improving  Short term goal 4: Pt demo >4/5 shoulder and elbow strength in all directions and pain reported <1/10 to facilitate return to work Improving  Long term goals  Time Frame for Long term goals : 10 weeks  Long term goal 1: Pt demo ability to reach overhead x10 with 3 lbs to mimic reaching into an overhead cabinet  Long term goal 2: Pt demo WNL shoulder AROM in all directions with pain <1/10 to improve ability to compelte his work duties  Long term goal 3: Pt demo 5/5 shoulder and elbow strength in all directions and pain reported <1/10 to facilitate return to work    Summary of Evaluation:  Pt is 49year old male who underwent a biceps tenodesis, rotator cuff debridement, and distal clavicle resection on 1/31/22. Pt now has difficulties reaching/lifting his arm, washing his back, shaving his head, toilet hygiene, work duties, and golfing. Pt demo deficits this date that include reduced shoulder/elbow ROM, reduced rotator cuff/scapular/biceps strength, decreased tolerance to active movement, WNL sensation, ongoing pain with rest/activity. Pt will benefit with PT services with shoulder/elbow PROM/AAROM/AROM, progressive rotator cuff/scapular/biceps strengthening, GH mobilizations, modalities as needed for pain management, manual/STM per protocol to return to PLOF. Pt prior to onset of current condition had no pain with able to complete full ADLs and work activities. Patient received education on their current pathology and how their condition effects them with their functional activities. Patient understood discussion and questions were answered. Patient understands their activity limitations and understands rational for treatment progression. Subjective: Darling Chappell reports some minimal pain upon arrival today. States he has been doing well with the home therapy last week. Return to work on May 2nd. Any changes in Ambulatory Summary Sheet?   None      Objective:    COVID screening questions were asked and patient attested that there had been no contact or symptoms     See PN    Exercises: (No more than 4 columns)   Exercise/Equipment 3/21/22 #8 3/23/2022 #9 4/4/22 #10           WARM UP         Pulley's x20 20x --   UBE   2'/2'   TABLE      PROM of elbow/shoulder Biceps curl #2 2x10 Bicep curl 1# 10x2    *Wrist AROM      *Ball Squeezes      *Pendulums       *Scap Squeezes    YTB 2x10 YTB 10x2    Cane Seated ER 2x10 5\" Seated 10x2 5\"     Supine Cane Flexion 2x10 5\" w/ cane  Try weighted bar next  1# bar 10x2    Seated table slides in flexion      Prone extension  10x2    ER isometric 2x10 5\"     SL Flex/ABD next AA 10x2 ea dir --   Biceps Curl   2x10 7#   STANDING      Roller on Wall 2x10 3\" 10x2 3\"     TRX Walk-Outs 2x10 15x 5\"     Scaption Raise 2x10 no weight  -- 2x10 1# in front of mirror   Triceps Extension TB      Standing SB stretch into flexion  10x2 GSB     Wall Push-UP   2x10   OH Reach        2x10   TB ER   2x10 RTB SA   Farmer's Carry   20# x2 laps on the turf    TRX Rows    2x10   PROPRIOCEPTION                                    MODALITIES      vaso 10' 10'  10'             Other Therapeutic Activities/Education:  Patient received education on their current pathology and how their condition effects them with their functional activities. Patient understood discussion and questions were answered. Patient understands their activity limitations and understands rational for treatment progression. Home Exercise Program:  HO issued, reviewed and discussed with patient. Pt agreed to comply. Manual Treatments:  PROM into external rotation x5'       Modalities:  Gameready to L shoulder/elbow complex in sitting, low pressure, 34 deg x15' for pain management. No adverse effects. Communication with other providers:  gamaliel sent 2/22/22      Assessment:  Adiel Guevara has completed 10 visits since the start of therapy on 2/22/22. He demonstrates good improvements with overhead mobility, but continues to have slow progress with shoulder external rotation. As he begins more active overhead motion noting increased shoulder shrug compensation. His strength is improving, but continues to have weakness with rotator cuff musculature. Will be returning to work at the beginning of May and will need to be able to lift up to 30 lbs. Will continue to progress towards goals and address remaining deficits.    End of session: 2/10     Pt is 49year old male who underwent a biceps tenodesis, rotator cuff debridement, and distal clavicle resection on 1/31/22. Pt now has difficulties reaching/lifting his arm, washing his back, shaving his head, toilet hygiene, work duties, and golfing. Pt demo deficits this date that include reduced shoulder/elbow ROM, reduced rotator cuff/scapular/biceps strength, decreased tolerance to active movement, WNL sensation, ongoing pain with rest/activity. Pt will benefit with PT services with shoulder/elbow PROM/AAROM/AROM, progressive rotator cuff/scapular/biceps strengthening, GH mobilizations, modalities as needed for pain management, manual/STM per protocol to return to PLOF. Pt prior to onset of current condition had no pain with able to complete full ADLs and work activities. Patient received education on their current pathology and how their condition effects them with their functional activities. Patient understood discussion and questions were answered. Patient understands their activity limitations and understands rational for treatment progression.        Plan for Next Session:  --      Time In / Time Out:   1879-2766      Timed Code/Total Treatment Minutes:  45'/55'       2 TE (35') 1   TA  (10')   1 vaso (10')     Next Progress Note due: 10th visit       Plan of Care Interventions:  [x] Therapeutic Exercise  [] Modalities:  [x] Therapeutic Activity     [] Ultrasound  [] Estim  [x] Gait Training      [] Cervical Traction [] Lumbar Traction  [x] Neuromuscular Re-education    [] Cold/hotpack [] Iontophoresis   [x] Instruction in HEP      [x] Vasopneumatic   [] Dry Needling    [x] Manual Therapy               [] Aquatic Therapy              Electronically signed by:Jailyn Macias DPT, CSCS  4/4/2022,  4/4/2022,8:33 AM

## 2022-04-04 NOTE — PROGRESS NOTES
Outpatient Physical Therapy           [place]           [] Phone: [phone]   Fax: [phone]  [place]           [] Phone: [phone]   Fax: [phone]      To: Dr. Teresa Davila   From: Ty Dennis PT     Patient: Dallin Meza    : 1965  Diagnosis:  Diagnosis: L shoulder surg (biceps tenodesis without RC repair)     Date: 2022  Treatment Diagnosis:   Decreased LUE strength and mobility, limtied by pain      [x]  Progress Note                []  Discharge Note    Evaluation Date:   22  Total Visits to date:  10  Cancels/No-shows to date:  1    Subjective:  Mike Shin reports some minimal pain upon arrival today. States he has been doing well with the home therapy last week. Return to work on May 2nd. Plan of Care/Treatment to date:  [x] Therapeutic Exercise    [] Modalities:  [x] Therapeutic Activity     [] Ultrasound  [] Electrical Stimulation  [] Gait Training      [] Cervical Traction   [] Lumbar Traction  [x] Neuromuscular Re-education  [] Cold/hotpack [] Iontophoresis  [x] Instruction in HEP      Other:  [x] Manual Therapy       [x]  Vasopneumatic  [] Aquatic Therapy       []   Dry Needle Therapy                      Objective/Significant Findings At Last Visit/Comments:      Quick DASH:  59%    L Shoulder ROM:  Flexion: 120 deg (AAROM), 113 deg (AROM)  Abduction: 105 deg (AROM), shoulder shrug compensation   ER: 20 deg (PROM)    Functional IR: scarum  Functional ER: reach L ear    Palpation: noted small\"lump\" along lateral deltoid, slight pain with pressure on site     LUE Strength:  Flexion: 4/5   Abduction: 4-/5  ER: 4-/5  IR: 5/5  Biceps: 5/5  Triceps: 5/5    Assessment:   Mike Shin has completed 10 visits since the start of therapy on 22. He demonstrates good improvements with overhead mobility, but continues to have slow progress with shoulder external rotation. As he begins more active overhead motion noting increased shoulder shrug compensation.  His strength is improving, but continues to have weakness with rotator cuff musculature. Will be returning to work at the beginning of May and will need to be able to lift up to 30 lbs. Will continue to progress towards goals and address remaining deficits.      Goal Status:  [] Achieved [x] Partially Achieved  [] Not Achieved     Changes to goals:    Patient goals : improve ability to return to work and golf  Short term goals  Time Frame for Short term goals: 5 weeks  Short term goal 1: Pt demo I w/ HEP and pain management reports good compliance  Short term goal 2: Pt demo Quick DASH score improvement by 25% to increase ability to return to work duties MET  Short term goal 3: Pt demo WNL shoulder PROM in all directions with pain <1/10 to improve overhead reaching Improving  Short term goal 4: Pt demo >4/5 shoulder and elbow strength in all directions and pain reported <1/10 to facilitate return to work Improving  Long term goals  Time Frame for Long term goals : 10 weeks  Long term goal 1: Pt demo ability to reach overhead x10 with 3 lbs to mimic reaching into an overhead cabinet  Long term goal 2: Pt demo WNL shoulder AROM in all directions with pain <1/10 to improve ability to compelte his work duties  Long term goal 3: Pt demo 5/5 shoulder and elbow strength in all directions and pain reported <1/10 to facilitate return to work      Frequency/Duration:  # Days per week: [] 1 day # Weeks: [] 1 week [] 4 weeks [] 8 weeks     [x] 2 days   [] 2 weeks [] 5 weeks [] 10 weeks     [] 3 days   [] 3 weeks [x] 6 weeks [] 12 weeks       Rehab Potential: [] Excellent [x] Good [] Fair  [] Poor     Patient Status: [x] Continue per initial plan of Care     [] Patient now discharged     [x] Additional visits requested, Please re-certify for additional visits:      Requested frequency/duration:  2 /week for 3 weeks    If we are requesting more visits, we fully anticipate the patient's condition is expected to improve within the treatment timeframe we are requesting. Electronically signed by:  Arcenio Barrera PT, DPT, CSCS 4/4/2022, 8:35 AM    If you have any questions or concerns, please don't hesitate to call.   Thank you for your referral.    Physician Signature:______________________ Date:______ Time: ________  By signing above, therapists plan is approved by physician

## 2022-04-08 ENCOUNTER — HOSPITAL ENCOUNTER (OUTPATIENT)
Dept: PHYSICAL THERAPY | Age: 57
Setting detail: THERAPIES SERIES
Discharge: HOME | End: 2022-04-08
Payer: COMMERCIAL

## 2022-04-08 PROCEDURE — 97016 VASOPNEUMATIC DEVICE THERAPY: CPT

## 2022-04-08 PROCEDURE — 97140 MANUAL THERAPY 1/> REGIONS: CPT

## 2022-04-08 PROCEDURE — 97112 NEUROMUSCULAR REEDUCATION: CPT

## 2022-04-08 PROCEDURE — 97110 THERAPEUTIC EXERCISES: CPT

## 2022-04-08 NOTE — FLOWSHEET NOTE
Outpatient Physical Therapy  [place]           [x] Phone: [phone]   Fax: [phone]  [place]           [] Phone: [phone]   Fax: [phone]        Physical Therapy Daily Treatment Note  Date:  2022    Patient Name:  Johnny Kramer    :  1965  MRN: [id number]  Restrictions/Precautions: limit ER to 40 deg (4 weeks), no extension past body (4 weeks), no shoulder motion behind the back, no closed chain *See folder for Samson protocol*  Diagnosis:   Diagnosis: L shoulder surg (biceps tenodesis without RC repair)  Date of Injury/Surgery: 22  Treatment Diagnosis: Treatment Diagnosis: Decreased LUE strength and mobility, limtied by pain    Insurance/Certification information: PT Insurance Information: [employer] $35 co-pay  Referring Physician:  Referring Practitioner: Dr. Enrique Gill  Next Doctor Visit:  2022  Plan of care signed (Y/N):  YES, sent 22  Outcome Measure: Quick DASH: 77%  Visit# / total visits:     Pain level: 3/10   Goals:     Patient goals : improve ability to return to work and golf  Short term goals  Time Frame for Short term goals: 5 weeks  Short term goal 1: Pt demo I w/ HEP and pain management reports good compliance  Short term goal 2: Pt demo Quick DASH score improvement by 25% to increase ability to return to work duties MET  Short term goal 3: Pt demo WNL shoulder PROM in all directions with pain <1/10 to improve overhead reaching Improving  Short term goal 4: Pt demo >4/5 shoulder and elbow strength in all directions and pain reported <1/10 to facilitate return to work Improving  Long term goals  Time Frame for Long term goals : 10 weeks  Long term goal 1: Pt demo ability to reach overhead x10 with 3 lbs to mimic reaching into an overhead cabinet  Long term goal 2: Pt demo WNL shoulder AROM in all directions with pain <1/10 to improve ability to compelte his work duties  Long term goal 3: Pt demo 5/5 shoulder and elbow strength in all directions and pain reported <1/10 to facilitate return to work    Summary of Evaluation:  Pt is 49year old male who underwent a biceps tenodesis, rotator cuff debridement, and distal clavicle resection on 1/31/22. Pt now has difficulties reaching/lifting his arm, washing his back, shaving his head, toilet hygiene, work duties, and golfing. Pt demo deficits this date that include reduced shoulder/elbow ROM, reduced rotator cuff/scapular/biceps strength, decreased tolerance to active movement, WNL sensation, ongoing pain with rest/activity. Pt will benefit with PT services with shoulder/elbow PROM/AAROM/AROM, progressive rotator cuff/scapular/biceps strengthening, GH mobilizations, modalities as needed for pain management, manual/STM per protocol to return to PLOF. Pt prior to onset of current condition had no pain with able to complete full ADLs and work activities. Patient received education on their current pathology and how their condition effects them with their functional activities. Patient understood discussion and questions were answered. Patient understands their activity limitations and understands rational for treatment progression. Subjective: Pt stated that his L shoulder pain was about 3/10. Pt stated that shoulder is very tight today. Any changes in Ambulatory Summary Sheet?   None      Objective:    COVID screening questions were asked and patient attested that there had been no contact or symptoms     30° ER PROM    Exercises: (No more than 4 columns)   Exercise/Equipment 3/21/22 #8 3/23/2022 #9 4/4/22 #10 4/8/2022 #11            WARM UP          Pulley's x20 20x --    UBE   2'/2' 2'/2'   TABLE       PROM of elbow/shoulder Biceps curl #2 2x10 Bicep curl 1# 10x2     *Wrist AROM       *Ball Squeezes       *Pendulums        *Scap Squeezes    YTB 2x10 YTB 10x2     Cane Seated ER 2x10 5\" Seated 10x2 5\"      Supine Cane Flexion 2x10 5\" w/ cane  Try weighted bar next  1# bar 10x2     Seated table slides in flexion       Prone extension  10x2     ER isometric 2x10 5\"      SL Flex/ABD next AA 10x2 ea dir --    Biceps Curl   2x10 7# 7# 10x2   STANDING       Roller on Wall 2x10 3\" 10x2 3\"      TRX Walk-Outs 2x10 15x 5\"   10x 5\"    Scaption Raise 2x10 no weight  -- 2x10 1# in front of mirror 1# 10x2   Triceps Extension TB       Standing SB stretch into flexion  10x2 GSB      Wall saw    RSB 10x2   Wall Push-UP   2x10 10x2   OH Reach        2x10 10x2   TB ER   2x10 RTB SA RTB SA  10x2   Farmer's Carry   20# x2 laps on the turf  25# L and 30#  On R kettle bells 2 laps on the turf    TRX Rows    2x10 10x2   PROPRIOCEPTION                                          MODALITIES       vaso 10' 10'  10' 10'               Other Therapeutic Activities/Education:  Patient received education on their current pathology and how their condition effects them with their functional activities. Patient understood discussion and questions were answered. Patient understands their activity limitations and understands rational for treatment progression. Home Exercise Program:  HO issued, reviewed and discussed with patient. Pt agreed to comply. Manual Treatments:  PROM and scap mobility x 15'       Modalities:  Gameready to L shoulder/elbow complex in sitting, low pressure, 34 deg x15' for pain management. No adverse effects. Communication with other providers:  joséal sent 2/22/22      Assessment:  Pt tolerated treatment fairly well. Pt initially had decreased ROM and scapular mobility that improved after manual treatment. Pt was able to increase activity in the gym today. Pt rated pain at 4/10 after treatment and 2/10 after vaso. Pt is 49year old male who underwent a biceps tenodesis, rotator cuff debridement, and distal clavicle resection on 1/31/22. Pt now has difficulties reaching/lifting his arm, washing his back, shaving his head, toilet hygiene, work duties, and golfing.  Pt demo deficits this date that include reduced shoulder/elbow ROM, reduced rotator cuff/scapular/biceps strength, decreased tolerance to active movement, WNL sensation, ongoing pain with rest/activity. Pt will benefit with PT services with shoulder/elbow PROM/AAROM/AROM, progressive rotator cuff/scapular/biceps strengthening, GH mobilizations, modalities as needed for pain management, manual/STM per protocol to return to PLOF. Pt prior to onset of current condition had no pain with able to complete full ADLs and work activities. Patient received education on their current pathology and how their condition effects them with their functional activities. Patient understood discussion and questions were answered. Patient understands their activity limitations and understands rational for treatment progression.        Plan for Next Session:  --      Time In / Time Out: 1005/ 1058      Timed Code/Total Treatment Minutes:  43'/53'  1 man  (15') 1 TE ( 20') 1 neuro (8')     1 vaso (10')     Next Progress Note due: 10th visit       Plan of Care Interventions:  [x] Therapeutic Exercise  [] Modalities:  [x] Therapeutic Activity     [] Ultrasound  [] Estim  [x] Gait Training      [] Cervical Traction [] Lumbar Traction  [x] Neuromuscular Re-education    [] Cold/hotpack [] Iontophoresis   [x] Instruction in HEP      [x] Vasopneumatic   [] Dry Needling    [x] Manual Therapy               [] Aquatic Therapy              Electronically signed by:Zonia Zavaleta PTA    4/8/2022,8:28 AM     4/8/2022,5:06 PM

## 2022-04-11 ENCOUNTER — HOSPITAL ENCOUNTER (OUTPATIENT)
Dept: PHYSICAL THERAPY | Age: 57
Setting detail: THERAPIES SERIES
Discharge: HOME | End: 2022-04-11
Payer: COMMERCIAL

## 2022-04-11 DIAGNOSIS — E11.21 TYPE 2 DIABETES MELLITUS WITH DIABETIC NEPHROPATHY, WITHOUT LONG-TERM CURRENT USE OF INSULIN (HCC): ICD-10-CM

## 2022-04-11 PROCEDURE — 97016 VASOPNEUMATIC DEVICE THERAPY: CPT

## 2022-04-11 PROCEDURE — 97110 THERAPEUTIC EXERCISES: CPT

## 2022-04-11 RX ORDER — ROSUVASTATIN CALCIUM 5 MG/1
5 TABLET, COATED ORAL NIGHTLY
Qty: 90 TABLET | Refills: 1 | OUTPATIENT
Start: 2022-04-11

## 2022-04-11 NOTE — FLOWSHEET NOTE
Outpatient Physical Therapy  [place]           [x] Phone: [phone]   Fax: [phone]  [place]           [] Phone: [phone]   Fax: [phone]        Physical Therapy Daily Treatment Note  Date:  2022    Patient Name:  Kimberly Jensen    :  1965  MRN: [id number]  Restrictions/Precautions: limit ER to 40 deg (4 weeks), no extension past body (4 weeks), no shoulder motion behind the back, no closed chain *See folder for [facility]on protocol*  Diagnosis:   Diagnosis: L shoulder surg (biceps tenodesis without RC repair)  Date of Injury/Surgery: 22  Treatment Diagnosis: Treatment Diagnosis: Decreased LUE strength and mobility, limtied by pain    Insurance/Certification information: PT Insurance Information: [employer] $82 co-pay  Referring Physician:  Referring Practitioner: Dr. Isaac Brewer Doctor Visit:  2022  Plan of care signed (Y/N):  YES, sent 22  Outcome Measure: Quick DASH: 77%  Visit# / total visits:     Pain level: 3/10   Goals:     Patient goals : improve ability to return to work and golf  Short term goals  Time Frame for Short term goals: 5 weeks  Short term goal 1: Pt demo I w/ HEP and pain management reports good compliance  Short term goal 2: Pt demo Quick DASH score improvement by 25% to increase ability to return to work duties MET  Short term goal 3: Pt demo WNL shoulder PROM in all directions with pain <1/10 to improve overhead reaching Improving  Short term goal 4: Pt demo >4/5 shoulder and elbow strength in all directions and pain reported <1/10 to facilitate return to work Improving  Long term goals  Time Frame for Long term goals : 10 weeks  Long term goal 1: Pt demo ability to reach overhead x10 with 3 lbs to mimic reaching into an overhead cabinet  Long term goal 2: Pt demo WNL shoulder AROM in all directions with pain <1/10 to improve ability to compelte his work duties  Long term goal 3: Pt demo 5/5 shoulder and elbow strength in all directions and pain reported <1/10 to facilitate return to work    Summary of Evaluation:  Pt is 49year old male who underwent a biceps tenodesis, rotator cuff debridement, and distal clavicle resection on 1/31/22. Pt now has difficulties reaching/lifting his arm, washing his back, shaving his head, toilet hygiene, work duties, and golfing. Pt demo deficits this date that include reduced shoulder/elbow ROM, reduced rotator cuff/scapular/biceps strength, decreased tolerance to active movement, WNL sensation, ongoing pain with rest/activity. Pt will benefit with PT services with shoulder/elbow PROM/AAROM/AROM, progressive rotator cuff/scapular/biceps strengthening, GH mobilizations, modalities as needed for pain management, manual/STM per protocol to return to PLOF. Pt prior to onset of current condition had no pain with able to complete full ADLs and work activities. Patient received education on their current pathology and how their condition effects them with their functional activities. Patient understood discussion and questions were answered. Patient understands their activity limitations and understands rational for treatment progression. Subjective: Pt stated that his L shoulder pain was about 3/10. Pt stated that shoulder is very tight today. Any changes in Ambulatory Summary Sheet?   None      Objective:    COVID screening questions were asked and patient attested that there had been no contact or symptoms     33° ER PROM    Exercises: (No more than 4 columns)   Exercise/Equipment 4/4/22 #10 4/8/2022 #11 4/11/22 #12           WARM UP         Pulley's --     UBE 2'/2' 2'/2' 2'/2'   TABLE      PROM of elbow/shoulder      *Wrist AROM      *Ball Squeezes      *Pendulums       *Scap Squeezes         [employer] Seated ER      Supine Cane Flexion      Seated table slides in flexion      Prone extension      ER isometric      SL Flex/ABD --     Biceps Curl 2x10 7# 7# 10x2 2x10 8#   STANDING      Roller on Wall TRX Walk-Outs  10x 5\"  2x10   Scaption Raise 2x10 1# in front of mirror 1# 10x2 2x10 1#   Triceps Extension TB      Standing SB stretch into flexion      Wall saw  RSB 10x2    Wall Push-UP 2x10 10x2 2x10   OH Reach      2x10 10x2 2x10   TB ER 2x10 RTB SA RTB SA  10x2 2x10 GTB   Farmer's Carry 20# x2 laps on the turf  25# L and 30#  On R kettle bells 2 laps on the turf  30# DB ea hand 3 laps down turf    TRX Rows  2x10 10x2 2x10   Lifting Mechanics   next   Sled Push/Pull   x3 laps 60#   PROPRIOCEPTION                                    MODALITIES      vaso 10' 10'  10'             Other Therapeutic Activities/Education:  Patient received education on their current pathology and how their condition effects them with their functional activities. Patient understood discussion and questions were answered. Patient understands their activity limitations and understands rational for treatment progression. Home Exercise Program:  HO issued, reviewed and discussed with patient. Pt agreed to comply. Manual Treatments:  PROM into external rotation x5'       Modalities:  Gameready to L shoulder/elbow complex in sitting, low pressure, 34 deg x15' for pain management. No adverse effects. Communication with other providers:  joséal sent 2/22/22      Assessment:  Pt tolerated his session well and no increase pain. He is still continuing to progress with external rotation ROM in scapular plane. His strength is making good improvements, but pt still has concerns about returning to work in full in 2 weeks. He needs to be able to lift heavy overhead. Will see doctor next week. End of session: 0/10 pain and min soreness     Pt is 49year old male who underwent a biceps tenodesis, rotator cuff debridement, and distal clavicle resection on 1/31/22. Pt now has difficulties reaching/lifting his arm, washing his back, shaving his head, toilet hygiene, work duties, and golfing.  Pt demo deficits this date that include reduced shoulder/elbow ROM, reduced rotator cuff/scapular/biceps strength, decreased tolerance to active movement, WNL sensation, ongoing pain with rest/activity. Pt will benefit with PT services with shoulder/elbow PROM/AAROM/AROM, progressive rotator cuff/scapular/biceps strengthening, GH mobilizations, modalities as needed for pain management, manual/STM per protocol to return to PLOF. Pt prior to onset of current condition had no pain with able to complete full ADLs and work activities. Patient received education on their current pathology and how their condition effects them with their functional activities. Patient understood discussion and questions were answered. Patient understands their activity limitations and understands rational for treatment progression.        Plan for Next Session:  --      Time In / Time Out: 6070-9967      Timed Code/Total Treatment Minutes:  35'/45'   2 TE ( 35') 1 vaso (10')     Next Progress Note due: 10th visit       Plan of Care Interventions:  [x] Therapeutic Exercise  [] Modalities:  [x] Therapeutic Activity     [] Ultrasound  [] Estim  [x] Gait Training      [] Cervical Traction [] Lumbar Traction  [x] Neuromuscular Re-education    [] Cold/hotpack [] Iontophoresis   [x] Instruction in HEP      [x] Vasopneumatic   [] Dry Needling    [x] Manual Therapy               [] Aquatic Therapy              Electronically signed by:Jailyn Ball, PT, DPT, CSCS    4/11/2022,8:34 AM  4/11/2022,8:34 AM

## 2022-04-14 ENCOUNTER — HOSPITAL ENCOUNTER (OUTPATIENT)
Dept: PHYSICAL THERAPY | Age: 57
Setting detail: THERAPIES SERIES
Discharge: HOME | End: 2022-04-14
Payer: COMMERCIAL

## 2022-04-14 PROCEDURE — 97110 THERAPEUTIC EXERCISES: CPT

## 2022-04-14 PROCEDURE — 97530 THERAPEUTIC ACTIVITIES: CPT

## 2022-04-14 PROCEDURE — 97016 VASOPNEUMATIC DEVICE THERAPY: CPT

## 2022-04-14 PROCEDURE — 97140 MANUAL THERAPY 1/> REGIONS: CPT

## 2022-04-14 NOTE — FLOWSHEET NOTE
Outpatient Physical Therapy  [place]           [x] Phone: [phone]   Fax: [phone]  [place]           [] Phone: [phone]   Fax: [phone]        Physical Therapy Daily Treatment Note  Date:  2022    Patient Name:  Moody Blackburn    :  1965  MRN: [id number]  Restrictions/Precautions: limit ER to 40 deg (4 weeks), no extension past body (4 weeks), no shoulder motion behind the back, no closed chain *See folder for [facility]on protocol*  Diagnosis:   Diagnosis: L shoulder surg (biceps tenodesis without RC repair)  Date of Injury/Surgery: 22  Treatment Diagnosis: Treatment Diagnosis: Decreased LUE strength and mobility, limtied by pain    Insurance/Certification information: PT Insurance Information: [employer] $54 co-pay  Referring Physician:  Referring Practitioner: Dr. Rocky Sicard  Next Doctor Visit:  2022  Plan of care signed (Y/N):  YES, sent 22  Outcome Measure: Quick DASH: 77%  Visit# / total visits:     Pain level: 3/10   Goals:     Patient goals : improve ability to return to work and golf  Short term goals  Time Frame for Short term goals: 5 weeks  Short term goal 1: Pt demo I w/ HEP and pain management reports good compliance  Short term goal 2: Pt demo Quick DASH score improvement by 25% to increase ability to return to work duties MET  Short term goal 3: Pt demo WNL shoulder PROM in all directions with pain <1/10 to improve overhead reaching Improving  Short term goal 4: Pt demo >4/5 shoulder and elbow strength in all directions and pain reported <1/10 to facilitate return to work Improving  Long term goals  Time Frame for Long term goals : 10 weeks  Long term goal 1: Pt demo ability to reach overhead x10 with 3 lbs to mimic reaching into an overhead cabinet  Long term goal 2: Pt demo WNL shoulder AROM in all directions with pain <1/10 to improve ability to compelte his work duties  Long term goal 3: Pt demo 5/5 shoulder and elbow strength in all directions and pain reported <1/10 to facilitate return to work    Summary of Evaluation:  Pt is 49year old male who underwent a biceps tenodesis, rotator cuff debridement, and distal clavicle resection on 1/31/22. Pt now has difficulties reaching/lifting his arm, washing his back, shaving his head, toilet hygiene, work duties, and golfing. Pt demo deficits this date that include reduced shoulder/elbow ROM, reduced rotator cuff/scapular/biceps strength, decreased tolerance to active movement, WNL sensation, ongoing pain with rest/activity. Pt will benefit with PT services with shoulder/elbow PROM/AAROM/AROM, progressive rotator cuff/scapular/biceps strengthening, GH mobilizations, modalities as needed for pain management, manual/STM per protocol to return to PLOF. Pt prior to onset of current condition had no pain with able to complete full ADLs and work activities. Patient received education on their current pathology and how their condition effects them with their functional activities. Patient understood discussion and questions were answered. Patient understands their activity limitations and understands rational for treatment progression. Subjective: Pt stated that his L shoulder pain was about 2/10. Any changes in Ambulatory Summary Sheet?   None      Objective:    COVID screening questions were asked and patient attested that there had been no contact or symptoms     37° ER PROM    Exercises: (No more than 4 columns)   Exercise/Equipment 4/4/22 #10 4/8/2022 #11 4/11/22 #12 4/14/2022 #13     TM     3.5 mph x 5'    WARM UP          Pulley's --      UBE 2'/2' 2'/2' 2'/2' 2'/2'   TABLE       PROM of elbow/shoulder       *Wrist AROM       *Ball Squeezes       *Pendulums        *Scap Squeezes          [employer] Seated ER       Supine Cane Flexion       Seated table slides in flexion       Prone extension       ER isometric       SL Flex/ABD --      Biceps Curl 2x10 7# 7# 10x2 2x10 8# 8# 10x2   STANDING Roller on Wall       TRX Walk-Outs  10x 5\"  2x10 10x2   Scaption Raise 2x10 1# in front of mirror 1# 10x2 2x10 1#    Triceps Extension TB       Standing SB stretch into flexion       Wall saw  RSB 10x2     Wall Push-UP 2x10 10x2 2x10 Counter push  Up 10x2   OH Reach      2x10 10x2 2x10    TB ER 2x10 RTB SA RTB SA  10x2 2x10 GTB GTB 10x2 limited ROM   ( poor mechanics   Farmer's Carry 20# x2 laps on the turf  25# L and 30#  On R kettle bells 2 laps on the turf  30# DB ea hand 3 laps down turf  30# DB ea hand 3 laps down turf    TRX Rows  2x10 10x2 2x10 10x2   Lifting Mechanics   next Sustained hold 7# DB 5\" hold x 5   Pull down w/ straight arms    CC 40# 10x2   Low fly     FM 3# 10x2   Mid press up and out    FM 7# 10x2   Sled Push/Pull   x3 laps 60# 60# 3 laps   PROPRIOCEPTION                                          MODALITIES       vaso 10' 10'  10' 10'               Other Therapeutic Activities/Education:  Patient received education on their current pathology and how their condition effects them with their functional activities. Patient understood discussion and questions were answered. Patient understands their activity limitations and understands rational for treatment progression. Home Exercise Program:  HO issued, reviewed and discussed with patient. Pt agreed to comply. Manual Treatments:  PROM in all directions, CFM to incisions due to keloid formation x 10'       Modalities:  Gameready to L shoulder/elbow complex in sitting, low pressure, 34 deg x15' for pain management. No adverse effects. Communication with other providers:  joséal sent 2/22/22      Assessment:  Pt tolerated treatment fairly well. Worked on trying to simulate his job today. Pt was able to get more ROM after treatment. Pt rated pain at 0/10 after vaso. Pt is 49year old male who underwent a biceps tenodesis, rotator cuff debridement, and distal clavicle resection on 1/31/22.  Pt now has difficulties reaching/lifting his arm, washing his back, shaving his head, toilet hygiene, work duties, and golfing. Pt demo deficits this date that include reduced shoulder/elbow ROM, reduced rotator cuff/scapular/biceps strength, decreased tolerance to active movement, WNL sensation, ongoing pain with rest/activity. Pt will benefit with PT services with shoulder/elbow PROM/AAROM/AROM, progressive rotator cuff/scapular/biceps strengthening, GH mobilizations, modalities as needed for pain management, manual/STM per protocol to return to PLOF. Pt prior to onset of current condition had no pain with able to complete full ADLs and work activities. Patient received education on their current pathology and how their condition effects them with their functional activities. Patient understood discussion and questions were answered. Patient understands their activity limitations and understands rational for treatment progression.        Plan for Next Session:  --      Time In / Time Out: 1033/1133    Timed Code/Total Treatment Minutes:   50'/ 60 1 TE ( 20') 1 TA (21') 1 man (10') 1 vaso (10')     Next Progress Note due: 10th visit       Plan of Care Interventions:  [x] Therapeutic Exercise  [] Modalities:  [x] Therapeutic Activity     [] Ultrasound  [] Estim  [x] Gait Training      [] Cervical Traction [] Lumbar Traction  [x] Neuromuscular Re-education    [] Cold/hotpack [] Iontophoresis   [x] Instruction in HEP      [x] Vasopneumatic   [] Dry Needling    [x] Manual Therapy               [] Aquatic Therapy              Electronically signed by:Zonia Curiel, PTA    4/14/2022,10:33 AM     4/14/2022,3:27 PM

## 2022-04-18 ENCOUNTER — HOSPITAL ENCOUNTER (OUTPATIENT)
Dept: PHYSICAL THERAPY | Age: 57
Setting detail: THERAPIES SERIES
Discharge: HOME | End: 2022-04-18
Payer: COMMERCIAL

## 2022-04-18 DIAGNOSIS — E11.21 TYPE 2 DIABETES MELLITUS WITH DIABETIC NEPHROPATHY, WITHOUT LONG-TERM CURRENT USE OF INSULIN (HCC): ICD-10-CM

## 2022-04-18 PROCEDURE — 97530 THERAPEUTIC ACTIVITIES: CPT

## 2022-04-18 PROCEDURE — 97110 THERAPEUTIC EXERCISES: CPT

## 2022-04-18 PROCEDURE — 97016 VASOPNEUMATIC DEVICE THERAPY: CPT

## 2022-04-18 RX ORDER — ROSUVASTATIN CALCIUM 5 MG/1
5 TABLET, COATED ORAL NIGHTLY
Qty: 90 TABLET | Refills: 1 | OUTPATIENT
Start: 2022-04-18

## 2022-04-18 NOTE — FLOWSHEET NOTE
Outpatient Physical Therapy  [place]           [x] Phone: [phone]   Fax: [phone]  [place]            [] Phone: [phone]   Fax: [phone]        Physical Therapy Daily Treatment Note  Date:  2022    Patient Name:  Hollace Seip    :  1965  MRN: [id number]  Restrictions/Precautions: limit ER to 40 deg (4 weeks), no extension past body (4 weeks), no shoulder motion behind the back, no closed chain *See folder for Samson protocol*  Diagnosis:   Diagnosis: L shoulder surg (biceps tenodesis without RC repair)  Date of Injury/Surgery: 22  Treatment Diagnosis: Treatment Diagnosis: Decreased LUE strength and mobility, limtied by pain    Insurance/Certification information: PT Insurance Information: [employer] $13 co-pay  Referring Physician:  Referring Practitioner: Dr. Rufus Leal  Next Doctor Visit:  2022  Plan of care signed (Y/N):  YES, sent 22  Outcome Measure: Quick DASH: 77%  Visit# / total visits:     Pain level: 3/10   Goals:     Patient goals : improve ability to return to work and golf  Short term goals  Time Frame for Short term goals: 5 weeks  Short term goal 1: Pt demo I w/ HEP and pain management reports good compliance  Short term goal 2: Pt demo Quick DASH score improvement by 25% to increase ability to return to work duties MET  Short term goal 3: Pt demo WNL shoulder PROM in all directions with pain <1/10 to improve overhead reaching Improving  Short term goal 4: Pt demo >4/5 shoulder and elbow strength in all directions and pain reported <1/10 to facilitate return to work Improving  Long term goals  Time Frame for Long term goals : 10 weeks  Long term goal 1: Pt demo ability to reach overhead x10 with 3 lbs to mimic reaching into an overhead cabinet  Long term goal 2: Pt demo WNL shoulder AROM in all directions with pain <1/10 to improve ability to compelte his work duties  Long term goal 3: Pt demo 5/5 shoulder and elbow strength in all directions and pain reported <1/10 to facilitate return to work    Summary of Evaluation:  Pt is 49year old male who underwent a biceps tenodesis, rotator cuff debridement, and distal clavicle resection on 1/31/22. Pt now has difficulties reaching/lifting his arm, washing his back, shaving his head, toilet hygiene, work duties, and golfing. Pt demo deficits this date that include reduced shoulder/elbow ROM, reduced rotator cuff/scapular/biceps strength, decreased tolerance to active movement, WNL sensation, ongoing pain with rest/activity. Pt will benefit with PT services with shoulder/elbow PROM/AAROM/AROM, progressive rotator cuff/scapular/biceps strengthening, GH mobilizations, modalities as needed for pain management, manual/STM per protocol to return to PLOF. Pt prior to onset of current condition had no pain with able to complete full ADLs and work activities. Patient received education on their current pathology and how their condition effects them with their functional activities. Patient understood discussion and questions were answered. Patient understands their activity limitations and understands rational for treatment progression. Subjective: Patient reports no pain upon arrival and was sore following the last session. He sees the doctor on April 20th to discuss return to work. Any changes in Ambulatory Summary Sheet?   None      Objective:    COVID screening questions were asked and patient attested that there had been no contact or symptoms     37° ER PROM    Exercises: (No more than 4 columns)   Exercise/Equipment 4/11/22 #12 4/14/2022 #13 4/18/22 #14     TM   3.5 mph x 5'     WARM UP         Pulley's      UBE 2'/2' 2'/2' 2'/2'   TABLE      PROM of elbow/shoulder      *Wrist AROM      *Ball Squeezes      *Pendulums       *Scap Squeezes         Leon Chang Seated ER      Supine Cane Flexion      Seated table slides in flexion      Prone extension      ER isometric      SL Flex/ABD      Biceps Curl 2x10 8# 8# 10x2 8# 2x10   STANDING      Roller on Wall      TRX Walk-Outs 2x10 10x2 2x10   Scaption Raise 2x10 1#  Front Raise w/ 6# DB 2x6, Sudarshan from therapist   Triceps Extension TB      Standing SB stretch into flexion      Wall saw      Wall Push-UP 2x10 Counter push  Up 10x2 Counter 2x10   OH Reach      2x10     TB ER 2x10 GTB GTB 10x2 limited ROM   ( poor mechanics --   Farmer's Carry 30# DB ea hand 3 laps down turf  30# DB ea hand 3 laps down turf  40# DB LLE only 3 laps on turf    TRX Rows  2x10 10x2 2x10   Lifting Mechanics next Sustained hold 7# DB 5\" hold x 5 --   Pull down w/ straight arms  CC 40# 10x2 2x10 t-bar on FM 20#   Low fly   FM 3# 10x2 Bench fly supine 2#   Mid press up and out  FM 7# 10x2 --   Sled Push/Pull x3 laps 60# 60# 3 laps 60# 3 laps    Bench press   2x10 un-weighted bar with spot   PROPRIOCEPTION                                    MODALITIES      vaso 10' 10'  10'             Other Therapeutic Activities/Education:  Patient received education on their current pathology and how their condition effects them with their functional activities. Patient understood discussion and questions were answered. Patient understands their activity limitations and understands rational for treatment progression. Home Exercise Program:  HO issued, reviewed and discussed with patient. Pt agreed to comply. Manual Treatments:        Modalities:  Gameready to L shoulder/elbow complex in sitting, low pressure, 34 deg x15' for pain management. No adverse effects. Communication with other providers:  eval sent 2/22/22      Assessment:  Pt tolerated treatment well and is making good progress in his strengthening. He returns to the doctor Wednesday to discuss return to work date. Discussed with patient about good progressions in therapy, but likely needs a few more weeks to get back to his full time duties involving 30 lbs of lifting away from his body. Pt rated pain at 0/10 after vaso. Pt is 49year old male who underwent a biceps tenodesis, rotator cuff debridement, and distal clavicle resection on 1/31/22. Pt now has difficulties reaching/lifting his arm, washing his back, shaving his head, toilet hygiene, work duties, and golfing. Pt demo deficits this date that include reduced shoulder/elbow ROM, reduced rotator cuff/scapular/biceps strength, decreased tolerance to active movement, WNL sensation, ongoing pain with rest/activity. Pt will benefit with PT services with shoulder/elbow PROM/AAROM/AROM, progressive rotator cuff/scapular/biceps strengthening, GH mobilizations, modalities as needed for pain management, manual/STM per protocol to return to PLOF. Pt prior to onset of current condition had no pain with able to complete full ADLs and work activities. Patient received education on their current pathology and how their condition effects them with their functional activities. Patient understood discussion and questions were answered. Patient understands their activity limitations and understands rational for treatment progression.        Plan for Next Session:  --      Time In / Time Out: 5270-7176    Timed Code/Total Treatment Minutes:   45'/ 55'        2 TE ( 25') 1 TA (20') 1 vaso (10')     Next Progress Note due: 10th visit       Plan of Care Interventions:  [x] Therapeutic Exercise  [] Modalities:  [x] Therapeutic Activity     [] Ultrasound  [] Estim  [x] Gait Training      [] Cervical Traction [] Lumbar Traction  [x] Neuromuscular Re-education    [] Cold/hotpack [] Iontophoresis   [x] Instruction in HEP      [x] Vasopneumatic   [] Dry Needling    [x] Manual Therapy               [] Aquatic Therapy              Electronically signed by:Jailyn Valverde, PT, DPT, CSCS    4/18/2022,8:35 AM  4/18/2022,8:35 AM

## 2022-04-19 RX ORDER — ALBUTEROL SULFATE 90 UG/1
2 AEROSOL, METERED RESPIRATORY (INHALATION) EVERY 6 HOURS PRN
Qty: 1 EACH | Refills: 3 | Status: SHIPPED | OUTPATIENT
Start: 2022-04-19

## 2022-04-20 ENCOUNTER — OFFICE VISIT (OUTPATIENT)
Dept: ORTHOPEDIC SURGERY | Age: 57
End: 2022-04-20

## 2022-04-20 ENCOUNTER — HOSPITAL ENCOUNTER (OUTPATIENT)
Dept: PHYSICAL THERAPY | Age: 57
Setting detail: THERAPIES SERIES
Discharge: HOME | End: 2022-04-20
Payer: COMMERCIAL

## 2022-04-20 VITALS
HEIGHT: 73 IN | OXYGEN SATURATION: 96 % | WEIGHT: 194 LBS | RESPIRATION RATE: 16 BRPM | BODY MASS INDEX: 25.71 KG/M2 | HEART RATE: 83 BPM

## 2022-04-20 DIAGNOSIS — Z98.890 S/P ARTHROSCOPY OF LEFT SHOULDER: Primary | ICD-10-CM

## 2022-04-20 PROCEDURE — 97110 THERAPEUTIC EXERCISES: CPT

## 2022-04-20 PROCEDURE — 99024 POSTOP FOLLOW-UP VISIT: CPT | Performed by: STUDENT IN AN ORGANIZED HEALTH CARE EDUCATION/TRAINING PROGRAM

## 2022-04-20 NOTE — LETTER
[facility]  [address]  Phone: [phone]  Fax: [phone]    Ashley Villanueva DO        April 20, 2022     Patient: Josee Vee   YOB: 1965   Date of Visit: 4/20/2022       To Whom It May Concern: It is my medical opinion that Rico Molina should remain out of work until 6/28/22. If you have any questions or concerns, please don't hesitate to call.     Sincerely,        Ashley Villanueva DO

## 2022-04-20 NOTE — PROGRESS NOTES
Date of surgery:  1/31/2022     Procedure:  1. Left shoulder Arthroscopic rotator cuff debridement   2.   Left shoulder Arthroscopic distal clavicle excision    3.   Left shoulder Arthroscopic subacromial decompression and bursectomy    4.   Left shoulder arthroscopic biceps tenotomy tenodesis    5.   Left shoulder Arthroscopic labral debridement    6.  Left shoulder Arthroscopic lysis of adhesions       History:  Evette Card is here in next week 11 week follow up regarding their  Left shoulder arthroscopy. Patient is doing well. They have 3/10 pain. They deny chest pain, SOB, calf pain,fever,wound drainage. No other issues. Patient denies any constitutional symptoms. Patient states they have been compliant with restrictions. No new injuries or complaints his last being seen. Patient states that physical therapy is going well and he feels he is progressing nicely. He states he has improved well since last visit and is already better than he was preoperatively. Physical:   Patient demonstrates appropriate mood and affect. Left upper extremity exam:   The incisions are clean, dry, intact and nontender with no erythema. Incisions have healed well without any concerning findings They have minimal edema, the Arm compartments are soft . There are No cords or calf tenderness. No significant calf/ankle edema. They are neurovascularly intact distally. Range of motion of the left shoulder demonstrates: Active forward flexion approximately 120 degrees, external rotation approximately 50 degrees, abduction approximately 90 degrees    No tenderness to palpation over arthroscopic portals, slightly improved from last visit.   Minimal dull pain over area of biceps tendon insertion      Imaging:   No new orthopedic imaging     Impression: Status post above, doing well        Plan:   -Patient doing well at this time.  -Educated patient on exercises to work on range of motion as well as arm positions to avoid due to biceps tenodesis  -continue the PT protocol   -Protocol provided to physical therapy team previously  -rest/elevation as needed  -No refills for needed today  -f/u in 2 months  -Will continue off work until follow-up appointment and will likely return to work at that time if he is progressing well.  -f/u sooner prn any issues

## 2022-04-20 NOTE — PROGRESS NOTES
Patient comes in today for a follow up of left shoulder arthroscopy on 1/31/22. He was last seen in our office on 3/11/22. He rates his pain at 3/10 currently. He states that his pain in dull and achy. His pain is located on the anterior portion of his shoulder. He denies any new falls or injuries. He is currently in PT and doing well.

## 2022-04-20 NOTE — LETTER
[facility]  [address]  Phone: [phone]  Fax: [phone]    Ankit Iyer DO        April 20, 2022     Patient: Karly Torres   YOB: 1965   Date of Visit: 4/20/2022       To Whom It May Concern: It is my medical opinion that Gerhardt Forester should remain out of work until next appointment on 6/20/22. If you have any questions or concerns, please don't hesitate to call.     Sincerely,        Ankit Iyer DO

## 2022-04-20 NOTE — FLOWSHEET NOTE
Outpatient Physical Therapy  [place]           [x] Phone: [phone]   Fax: [phone]  [place]           [] Phone: [phone]   Fax: [phone]        Physical Therapy Daily Treatment Note  Date:  2022    Patient Name:  Calin Faye    :  1965  MRN: [id number]  Restrictions/Precautions: limit ER to 40 deg (4 weeks), no extension past body (4 weeks), no shoulder motion behind the back, no closed chain *See folder for Samson protocol*  Diagnosis:   Diagnosis: L shoulder surg (biceps tenodesis without RC repair)  Date of Injury/Surgery: 22  Treatment Diagnosis: Treatment Diagnosis: Decreased LUE strength and mobility, limtied by pain    Insurance/Certification information: PT Insurance Information: [employer] $11 co-pay  Referring Physician:  Referring Practitioner: Dr. Joaquin Contreras  Next Doctor Visit:  2022  Plan of care signed (Y/N):  YES, sent 22  Outcome Measure: Quick DASH: 77%  Visit# / total visits:     Pain level: 3/10   Goals:     Patient goals : improve ability to return to work and golf  Short term goals  Time Frame for Short term goals: 5 weeks  Short term goal 1: Pt demo I w/ HEP and pain management reports good compliance  Short term goal 2: Pt demo Quick DASH score improvement by 25% to increase ability to return to work duties MET  Short term goal 3: Pt demo WNL shoulder PROM in all directions with pain <1/10 to improve overhead reaching Improving  Short term goal 4: Pt demo >4/5 shoulder and elbow strength in all directions and pain reported <1/10 to facilitate return to work Improving  Long term goals  Time Frame for Long term goals : 10 weeks  Long term goal 1: Pt demo ability to reach overhead x10 with 3 lbs to mimic reaching into an overhead cabinet  Long term goal 2: Pt demo WNL shoulder AROM in all directions with pain <1/10 to improve ability to compelte his work duties  Long term goal 3: Pt demo 5/5 shoulder and elbow strength in all Seated table slides in flexion       Prone extension       ER isometric       SL Flex/ABD       Biceps Curl 2x10 8# 8# 10x2 8# 2x10 8# 2x10   STANDING       Roller on Wall       TRX Walk-Outs 2x10 10x2 2x10 10x2   Scaption Raise 2x10 1#  Front Raise w/ 6# DB 2x6, Sudarshan from therapist Front raise w/ 6# DB 3x5 Sudarshan from therapist   Triceps Extension TB       Standing SB stretch into flexion       Wall saw       Wall Push-UP 2x10 Counter push  Up 10x2 Counter 2x10 Counter 2x10   OH Reach      2x10      TB ER 2x10 GTB GTB 10x2 limited ROM   ( poor mechanics --    Farmer's Carry 30# DB ea hand 3 laps down turf  30# DB ea hand 3 laps down turf  40# DB LLE only 3 laps on turf  40# DB L UE only 3    TRX Rows  2x10 10x2 2x10 10x2   Lifting Mechanics next Sustained hold 7# DB 5\" hold x 5 --    Pull down w/ straight arms  CC 40# 10x2 2x10 t-bar on FM 20# 2x10 t-bar on FM 20#   Low fly   FM 3# 10x2 Bench fly supine 2# Bench fly supine 2#   Mid press up and out  FM 7# 10x2 --    Sled Push/Pull x3 laps 60# 60# 3 laps 60# 3 laps  100# 3 laps push/pull with TRX   Bench press   2x10 un-weighted bar with spot 2x10 un-weighted bar with spot   FM ROW    2x10 60#   PROPRIOCEPTION                                          MODALITIES       vaso 10' 10'  10' --              Other Therapeutic Activities/Education:  Patient received education on their current pathology and how their condition effects them with their functional activities. Patient understood discussion and questions were answered. Patient understands their activity limitations and understands rational for treatment progression. Home Exercise Program:  HO issued, reviewed and discussed with patient. Pt agreed to comply. Manual Treatments:              Communication with other providers:  eval sent 2/22/22      Assessment:  Harman Sarabia demonstrates good tolerance to today's session and notes no increased pain.  He continues to have difficulties with front raises without assistance from therapist. He is making good progress towards returning to work but continues to note deficits with overhead mobility/strength. Pt rated pain at 0/10 after vaso. Pt is 49year old male who underwent a biceps tenodesis, rotator cuff debridement, and distal clavicle resection on 1/31/22. Pt now has difficulties reaching/lifting his arm, washing his back, shaving his head, toilet hygiene, work duties, and golfing. Pt demo deficits this date that include reduced shoulder/elbow ROM, reduced rotator cuff/scapular/biceps strength, decreased tolerance to active movement, WNL sensation, ongoing pain with rest/activity. Pt will benefit with PT services with shoulder/elbow PROM/AAROM/AROM, progressive rotator cuff/scapular/biceps strengthening, GH mobilizations, modalities as needed for pain management, manual/STM per protocol to return to PLOF. Pt prior to onset of current condition had no pain with able to complete full ADLs and work activities. Patient received education on their current pathology and how their condition effects them with their functional activities. Patient understood discussion and questions were answered. Patient understands their activity limitations and understands rational for treatment progression.        Plan for Next Session:  --      Time In / Time Out: 1014/1030 ( [facility])     2732-1774         (29' late due to running late with surgeon's appointment)     Timed Code/Total Treatment Minutes:   1 TE (15') SAB   (3) TE  39'    Next Progress Note due: 10th visit       Plan of Care Interventions:  [x] Therapeutic Exercise  [] Modalities:  [x] Therapeutic Activity     [] Ultrasound  [] Estim  [x] Gait Training      [] Cervical Traction [] Lumbar Traction  [x] Neuromuscular Re-education    [] Cold/hotpack [] Iontophoresis   [x] Instruction in HEP      [x] Vasopneumatic   [] Dry Needling    [x] Manual Therapy               [] Aquatic Therapy              Electronically signed by:[facility] PT, DPT, CSCS    4/20/2022,8:59 AM

## 2022-04-25 ENCOUNTER — HOSPITAL ENCOUNTER (OUTPATIENT)
Dept: PHYSICAL THERAPY | Age: 57
Setting detail: THERAPIES SERIES
Discharge: HOME | End: 2022-04-25
Payer: COMMERCIAL

## 2022-04-25 PROCEDURE — 97110 THERAPEUTIC EXERCISES: CPT

## 2022-04-25 PROCEDURE — 97530 THERAPEUTIC ACTIVITIES: CPT

## 2022-04-25 NOTE — FLOWSHEET NOTE
Outpatient Physical Therapy  [place]           [x] Phone: [phone]   Fax: [phone]  [place]           [] Phone: [phone]   Fax: [phone]        Physical Therapy Daily Treatment Note  Date:  2022    Patient Name:  David Stone    :  1965  MRN: [id number]  Restrictions/Precautions: limit ER to 40 deg (4 weeks), no extension past body (4 weeks), no shoulder motion behind the back, no closed chain *See folder for Samson protocol*  Diagnosis:   Diagnosis: L shoulder surg (biceps tenodesis without RC repair)  Date of Injury/Surgery: 22  Treatment Diagnosis: Treatment Diagnosis: Decreased LUE strength and mobility, limtied by pain    Insurance/Certification information: PT Insurance Information: [id number] [employer] $56 co-pay  Referring Physician:  Referring Practitioner: Dr. Shalom Aparicio  Next Doctor Visit:  2022  Plan of care signed (Y/N):  YES, sent 22  Outcome Measure: Quick DASH: 77%  Visit# / total visits:     Pain level: 3/10   Goals:     Patient goals : improve ability to return to work and golf  Short term goals  Time Frame for Short term goals: 5 weeks  Short term goal 1: Pt demo I w/ HEP and pain management reports good compliance  Short term goal 2: Pt demo Quick DASH score improvement by 25% to increase ability to return to work duties MET  Short term goal 3: Pt demo WNL shoulder PROM in all directions with pain <1/10 to improve overhead reaching Improving  Short term goal 4: Pt demo >4/5 shoulder and elbow strength in all directions and pain reported <1/10 to facilitate return to work Improving  Long term goals  Time Frame for Long term goals : 10 weeks  Long term goal 1: Pt demo ability to reach overhead x10 with 3 lbs to mimic reaching into an overhead cabinet  Long term goal 2: Pt demo WNL shoulder AROM in all directions with pain <1/10 to improve ability to compelte his work duties  Long term goal 3: Pt demo 5/5 shoulder and elbow strength in all Seated table slides in flexion      Prone extension      ER isometric      SL Flex/ABD      Biceps Curl 8# 2x10 8# 2x10 8# 2x10 bilat   STANDING      Roller on Wall      TRX Walk-Outs 2x10 10x2 x10   Scaption Raise Front Raise w/ 6# DB 2x6, Sudarshan from therapist Front raise w/ 6# DB 3x5 Sudarshan from therapist Front raise w/ 6# DB 3x5 Sudarshan from therapist   Triceps Extension TB      Standing SB stretch into flexion      Wall saw      Wall Push-UP Counter 2x10  2x10 at counter   OH Reach        2x10 active reach followed by wall walk   TB ER --     Farmer's Carry 40# DB LLE only 3 laps on turf      TRX Rows  2x10  2x10   Lifting Mechanics --     Pull down w/ straight arms 2x10 t-bar on FM 20#  2x10 t-bar on FM 23#   Low fly  Bench fly supine 2#  Bench supine 2x10 3#   Mid press up and out --     Sled Push/Pull 60# 3 laps  100# 3 laps push/pull with # 3 laps push/pull with TRX   Bench press 2x10 un-weighted bar with spot 2x10 un-weighted bar with spot 2x10   FM ROW  2x10 60# 2x10 60#   PROPRIOCEPTION      Plank Shoulder Taps    2x20 alt   SB \"I\", \"Y\", \"T\"   x10 ea dir BSG                     MODALITIES      vaso 10' -- --             Other Therapeutic Activities/Education:  Patient received education on their current pathology and how their condition effects them with their functional activities. Patient understood discussion and questions were answered. Patient understands their activity limitations and understands rational for treatment progression. Home Exercise Program:  HO issued, reviewed and discussed with patient. Pt agreed to comply. Manual Treatments:  PROM in all directions,  x 10'             Communication with other providers:  joséal sent 2/22/22      Assessment:  Velvet Guardian demonstrates good tolerance to today's session and notes no increased pain. Has good improvements with active overhead reaching, but still lacking at end range especially with increased fatigue.   Pt rated pain at 0/10 after

## 2022-04-27 ENCOUNTER — HOSPITAL ENCOUNTER (OUTPATIENT)
Dept: PHYSICAL THERAPY | Age: 57
Setting detail: THERAPIES SERIES
Discharge: HOME | End: 2022-04-27
Payer: COMMERCIAL

## 2022-04-27 PROCEDURE — 97140 MANUAL THERAPY 1/> REGIONS: CPT

## 2022-04-27 PROCEDURE — 97110 THERAPEUTIC EXERCISES: CPT

## 2022-04-27 PROCEDURE — 97530 THERAPEUTIC ACTIVITIES: CPT

## 2022-04-27 NOTE — FLOWSHEET NOTE
Outpatient Physical Therapy  [place]           [x] Phone: [phone]   Fax: [phone]  Zonia dotty           [] Phone: [phone]   Fax: [phone]        Physical Therapy Daily Treatment Note  Date:  2022    Patient Name:  Jessica Mai    :  1965  MRN: [id number]  Restrictions/Precautions: limit ER to 40 deg (4 weeks), no extension past body (4 weeks), no shoulder motion behind the back, no closed chain *See folder for Samson protocol*  Diagnosis:   Diagnosis: L shoulder surg (biceps tenodesis without RC repair)  Date of Injury/Surgery: 22  Treatment Diagnosis: Treatment Diagnosis: Decreased LUE strength and mobility, limtied by pain    Insurance/Certification information: PT Insurance Information: [employer] $81 co-pay  Referring Physician:  Referring Practitioner: Dr. Brennan Bender  Next Doctor Visit:  2022  Plan of care signed (Y/N):  YES, sent 22  Outcome Measure: Quick DASH: 77%  Visit# / total visits:     Pain level: 210   Goals:     Patient goals : improve ability to return to work and golf  Short term goals  Time Frame for Short term goals: 5 weeks  Short term goal 1: Pt demo I w/ HEP and pain management reports good compliance  Short term goal 2: Pt demo Quick DASH score improvement by 25% to increase ability to return to work duties MET  Short term goal 3: Pt demo WNL shoulder PROM in all directions with pain <1/10 to improve overhead reaching Improving  Short term goal 4: Pt demo >4/5 shoulder and elbow strength in all directions and pain reported <1/10 to facilitate return to work Improving  Long term goals  Time Frame for Long term goals : 10 weeks  Long term goal 1: Pt demo ability to reach overhead x10 with 3 lbs to mimic reaching into an overhead cabinet  Long term goal 2: Pt demo WNL shoulder AROM in all directions with pain <1/10 to improve ability to compelte his work duties  Long term goal 3: Pt demo 5/5 shoulder and elbow strength in all directions and pain reported <1/10 to facilitate return to work    Summary of Evaluation:  Pt is 49year old male who underwent a biceps tenodesis, rotator cuff debridement, and distal clavicle resection on 1/31/22. Pt now has difficulties reaching/lifting his arm, washing his back, shaving his head, toilet hygiene, work duties, and golfing. Pt demo deficits this date that include reduced shoulder/elbow ROM, reduced rotator cuff/scapular/biceps strength, decreased tolerance to active movement, WNL sensation, ongoing pain with rest/activity. Pt will benefit with PT services with shoulder/elbow PROM/AAROM/AROM, progressive rotator cuff/scapular/biceps strengthening, GH mobilizations, modalities as needed for pain management, manual/STM per protocol to return to PLOF. Pt prior to onset of current condition had no pain with able to complete full ADLs and work activities. Patient received education on their current pathology and how their condition effects them with their functional activities. Patient understood discussion and questions were answered. Patient understands their activity limitations and understands rational for treatment progression. Subjective: Pt stated that his pain was about 2/10 today. Pt stated that he feels like he getting better. Any changes in Ambulatory Summary Sheet?   None      Objective:    COVID screening questions were asked and patient attested that there had been no contact or symptoms     42° ER PROM        Exercises: (No more than 4 columns)   Exercise/Equipment 4/18/22 #14 4/20/2022 #15 4/25/22 #16 4/27/2022 #17     TM   10' 3.0 mph, 0 incline     WARM UP          Pulley's       UBE 2'/2' 2'/2' 2'/2' 2.5'/2.5'    TABLE       PROM of elbow/shoulder       *Wrist AROM       *Ball Squeezes       *Pendulums        *Scap Squeezes          [employer] Seated ER       Supine Cane Flexion       Seated table slides in flexion       Prone extension       ER isometric       SL Flex/ABD Biceps Curl 8# 2x10 8# 2x10 8# 2x10 bilat 8# 10x2 B   STANDING       Roller on Wall       TRX Walk-Outs 2x10 10x2 x10 10x   Scaption Raise Front Raise w/ 6# DB 2x6, Sudarshan from therapist Front raise w/ 6# DB 3x5 Sudarshan from therapist Front raise w/ 6# DB 3x5 Sudarshan from therapist Front raise w/ 6# DB 3x5 Sudarshan from therapist   Triceps Extension TB       Standing SB stretch into flexion       Wall saw       Wall Push-UP Counter 2x10  2x10 at counter 10x2 at counter   OH Reach        2x10 active reach followed by wall walk 10x2    TB ER --      Farmer's Carry 40# DB LLE only 3 laps on turf       TRX Rows  2x10  2x10 10x2   Lifting Mechanics --      Pull down w/ straight arms 2x10 t-bar on FM 20#  2x10 t-bar on FM 23# 2x10 t-bar on FM 23#   Low fly  Bench fly supine 2#  Bench supine 2x10 3# Bench supine 2x10 3#   Mid press up and out --      Sled Push/Pull 60# 3 laps  100# 3 laps push/pull with # 3 laps push/pull with # 3 laps push/pull with TRX   Bench press 2x10 un-weighted bar with spot 2x10 un-weighted bar with spot 2x10 10x2 w/ spot   FM ROW  2x10 60# 2x10 60# 60# 10x2 ( use CC next time)   PROPRIOCEPTION       Plank Shoulder Taps    2x20 alt 10x2 alt   SB \"I\", \"Y\", \"T\"   x10 ea dir GSB GSB 10x ea dir                        MODALITIES       vaso 10' -- --               Other Therapeutic Activities/Education:  Patient received education on their current pathology and how their condition effects them with their functional activities. Patient understood discussion and questions were answered. Patient understands their activity limitations and understands rational for treatment progression. Home Exercise Program:  HO issued, reviewed and discussed with patient. Pt agreed to comply. Manual Treatments:  PROM in all directions,  x 10'             Communication with other providers:  gamaliel sent 2/22/22      Assessment:  Pt tolerated treatment well.  Pt was able to get more ROM today, but continues to have weakness with scaption and flexion . Pt is 49year old male who underwent a biceps tenodesis, rotator cuff debridement, and distal clavicle resection on 1/31/22. Pt now has difficulties reaching/lifting his arm, washing his back, shaving his head, toilet hygiene, work duties, and golfing. Pt demo deficits this date that include reduced shoulder/elbow ROM, reduced rotator cuff/scapular/biceps strength, decreased tolerance to active movement, WNL sensation, ongoing pain with rest/activity. Pt will benefit with PT services with shoulder/elbow PROM/AAROM/AROM, progressive rotator cuff/scapular/biceps strengthening, GH mobilizations, modalities as needed for pain management, manual/STM per protocol to return to PLOF. Pt prior to onset of current condition had no pain with able to complete full ADLs and work activities. Patient received education on their current pathology and how their condition effects them with their functional activities. Patient understood discussion and questions were answered. Patient understands their activity limitations and understands rational for treatment progression.        Plan for Next Session:  --      Time In / Time Out:   9216/6887       Timed Code/Total Treatment Minutes:   55'/55' 1 man( 10') 1 TA (25') 1 TE (20')     Next Progress Note due: 10th visit       Plan of Care Interventions:  [x] Therapeutic Exercise  [] Modalities:  [x] Therapeutic Activity     [] Ultrasound  [] Estim  [x] Gait Training      [] Cervical Traction [] Lumbar Traction  [x] Neuromuscular Re-education    [] Cold/hotpack [] Iontophoresis   [x] Instruction in HEP      [x] Vasopneumatic   [] Dry Needling    [x] Manual Therapy               [] Aquatic Therapy              Electronically signed by:Zonia Neely PTA    4/27/2022,8:57 AM     4/27/2022,12:58 PM

## 2022-05-02 ENCOUNTER — HOSPITAL ENCOUNTER (OUTPATIENT)
Dept: PHYSICAL THERAPY | Age: 57
Setting detail: THERAPIES SERIES
Discharge: HOME | End: 2022-05-02
Payer: COMMERCIAL

## 2022-05-02 PROCEDURE — 97110 THERAPEUTIC EXERCISES: CPT

## 2022-05-02 PROCEDURE — 97530 THERAPEUTIC ACTIVITIES: CPT

## 2022-05-02 NOTE — FLOWSHEET NOTE
Outpatient Physical Therapy  [place]           [x] Phone: [phone]   Fax: [phone]  Zonia storm           [] Phone: [phone]   Fax: [phone]        Physical Therapy Daily Treatment Note  Date:  2022    Patient Name:  Samira Nguyen    :  1965  MRN: [id number]  Restrictions/Precautions: limit ER to 40 deg (4 weeks), no extension past body (4 weeks), no shoulder motion behind the back, no closed chain *See folder for Samson protocol*  Diagnosis:   Diagnosis: L shoulder surg (biceps tenodesis without RC repair)  Date of Injury/Surgery: 22  Treatment Diagnosis: Treatment Diagnosis: Decreased LUE strength and mobility, limtied by pain    Insurance/Certification information: PT Insurance Information: [employer] $20 co-pay  Referring Physician:  Referring Practitioner: Dr. Nanda Shipman  Next Doctor Visit:  2022  Plan of care signed (Y/N):  YES, sent 22  Outcome Measure: Quick DASH: 52%  Visit# / total visits:     Pain level: 2/10   Goals:     Patient goals : improve ability to return to work and golf  Short term goals  Time Frame for Short term goals: 5 weeks  Short term goal 1: Pt demo I w/ HEP and pain management reports good compliance  Short term goal 2: Pt demo Quick DASH score improvement by 25% to increase ability to return to work duties MET  Short term goal 3: Pt demo WNL shoulder PROM in all directions with pain <1/10 to improve overhead reaching MET  Short term goal 4: Pt demo >4/5 shoulder and elbow strength in all directions and pain reported <1/10 to facilitate return to work Improving  Long term goals  Time Frame for Long term goals : 10 weeks  Long term goal 1: Pt demo ability to reach overhead x10 with 3 lbs to mimic reaching into an overhead cabinet Improving  Long term goal 2: Pt demo WNL shoulder AROM in all directions with pain <1/10 to improve ability to compelte his work duties Improving  Long term goal 3: Pt demo 5/5 shoulder and elbow strength in all directions and pain reported <1/10 to facilitate return to work Improving    Summary of Evaluation:  Pt is 49year old male who underwent a biceps tenodesis, rotator cuff debridement, and distal clavicle resection on 1/31/22. Pt now has difficulties reaching/lifting his arm, washing his back, shaving his head, toilet hygiene, work duties, and golfing. Pt demo deficits this date that include reduced shoulder/elbow ROM, reduced rotator cuff/scapular/biceps strength, decreased tolerance to active movement, WNL sensation, ongoing pain with rest/activity. Pt will benefit with PT services with shoulder/elbow PROM/AAROM/AROM, progressive rotator cuff/scapular/biceps strengthening, GH mobilizations, modalities as needed for pain management, manual/STM per protocol to return to PLOF. Pt prior to onset of current condition had no pain with able to complete full ADLs and work activities. Patient received education on their current pathology and how their condition effects them with their functional activities. Patient understood discussion and questions were answered. Patient understands their activity limitations and understands rational for treatment progression. Subjective:  Elba Reji reports 2/10 and min soreness from last session. Has been very compliant with his HEP    Any changes in Ambulatory Summary Sheet?   None      Objective:    COVID screening questions were asked and patient attested that there had been no contact or symptoms     SEE PN      Exercises: (No more than 4 columns)   Exercise/Equipment 4/25/22 #16 4/27/2022 #17 5/2/22 #18     TM       WARM UP         Pulley's      UBE 2'/2' 2.5'/2.5'  4'   TABLE      PROM of elbow/shoulder      *Wrist AROM      *Ball Squeezes      *Pendulums       *Scap Squeezes         [employer] Seated ER      Supine Cane Flexion      Seated table slides in flexion      Prone extension      ER isometric      SL Flex/ABD      Biceps Curl 8# 2x10 bilat 8# 10x2 B 8# 2x10 BUE STANDING      Roller on Wall      TRX Walk-Outs x10 10x x10   Scaption Raise Front raise w/ 6# DB 3x5 Sudarshan from therapist Front raise w/ 6# DB 3x5 Sudarshan from therapist Front raise w/ 5# DB 3x5 Sudarshan from therapist   Triceps Extension TB      Standing SB stretch into flexion      Wall saw      Wall Push-UP 2x10 at counter 10x2 at counter 2x10 counter   OH Reach      2x10 active reach followed by wall walk 10x2  2x10 4# MB BUE   TB ER      Farmer's Carry      TRX Rows  2x10 10x2 2x10   Lifting Mechanics      Pull down w/ straight arms 2x10 t-bar on FM 23# 2x10 t-bar on FM 23# 2x10 t-bar on FM 23#   Low fly  Bench supine 2x10 3# Bench supine 2x10 3# Bench supine 2x10 3#   Mid press up and out      Sled Push/Pull 100# 3 laps push/pull with # 3 laps push/pull with # 3 laps push/pull with TRX   Bench press 2x10 10x2 w/ spot 3x10 add 10 lbs w/ spot   FM ROW 2x10 60# 60# 10x2 ( use CC next time) 20# L, 30# R, SA 2x10   PROPRIOCEPTION      Plank Shoulder Taps  2x20 alt 10x2 alt --   SB \"I\", \"Y\", \"T\" x10 ea dir GSB GSB 10x ea dir GSB x10 ea dir                     MODALITIES      vaso -- --              Other Therapeutic Activities/Education:  Patient received education on their current pathology and how their condition effects them with their functional activities. Patient understood discussion and questions were answered. Patient understands their activity limitations and understands rational for treatment progression. Home Exercise Program:  HO issued, reviewed and discussed with patient. Pt agreed to comply. Manual Treatments:  x 10'             Communication with other providers:  gamaliel sent 2/22/22      Assessment:   Harman Sarabia has completed 18 visits since the start of therapy on 2/22/22. He demonstrates good improvements with overhead mobility and is showing good improvements with external rotation. As he begins more active overhead motion noting increased shoulder shrug compensation.  Has not returned to work as of yet due to concerns of lifting up to 30 lbs. He is improving with his lifting abilities, but still struggles past 90 deg of flexion. Will continue to progress towards goals and address remaining deficits prior to return to work. End of session: 3/10     Pt is 49year old male who underwent a biceps tenodesis, rotator cuff debridement, and distal clavicle resection on 1/31/22. Pt now has difficulties reaching/lifting his arm, washing his back, shaving his head, toilet hygiene, work duties, and golfing. Pt demo deficits this date that include reduced shoulder/elbow ROM, reduced rotator cuff/scapular/biceps strength, decreased tolerance to active movement, WNL sensation, ongoing pain with rest/activity. Pt will benefit with PT services with shoulder/elbow PROM/AAROM/AROM, progressive rotator cuff/scapular/biceps strengthening, GH mobilizations, modalities as needed for pain management, manual/STM per protocol to return to PLOF. Pt prior to onset of current condition had no pain with able to complete full ADLs and work activities. Patient received education on their current pathology and how their condition effects them with their functional activities. Patient understood discussion and questions were answered. Patient understands their activity limitations and understands rational for treatment progression.        Plan for Next Session:  --      Time In / Time Out:   6443-6847      Timed Code/Total Treatment Minutes:   48'  2 TA (25') 1 TE (20')     Next Progress Note due: 10th visit       Plan of Care Interventions:  [x] Therapeutic Exercise  [] Modalities:  [x] Therapeutic Activity     [] Ultrasound  [] Estim  [x] Gait Training      [] Cervical Traction [] Lumbar Traction  [x] Neuromuscular Re-education    [] Cold/hotpack [] Iontophoresis   [x] Instruction in HEP      [x] Vasopneumatic   [] Dry Needling    [x] Manual Therapy               [] Aquatic Therapy              Electronically signed by:Madison Gearl Buerger, PT, DPT, CSCS    5/2/2022,9:38 AM    5/2/2022,9:38 AM

## 2022-05-02 NOTE — PROGRESS NOTES
Outpatient Physical Therapy           [place]           [] Phone: [phone]   Fax: [phone]  [facility]           [] Phone: [phone]   Fax: [phone]      To: Dr. Joaquin Contreras   From: Ramin Edwards PT     Patient: Calin Faye    : 1965  Diagnosis:  Diagnosis: L shoulder surg (biceps tenodesis without RC repair)     Date: 2022  Treatment Diagnosis:   Decreased LUE strength and mobility, limtied by pain      [x]  Progress Note                []  Discharge Note    Evaluation Date:   22  Total Visits to date:  18  Cancels/No-shows to date:  1    Subjective:   Cheko Scott reports 2/10 and min soreness from last session. Has been very compliant with his HEP    Plan of Care/Treatment to date:  [x] Therapeutic Exercise    [] Modalities:  [x] Therapeutic Activity     [] Ultrasound  [] Electrical Stimulation  [] Gait Training      [] Cervical Traction   [] Lumbar Traction  [x] Neuromuscular Re-education  [] Cold/hotpack [] Iontophoresis  [x] Instruction in HEP      Other:  [x] Manual Therapy       [x]  Vasopneumatic  [] Aquatic Therapy       []   Dry Needle Therapy                      Objective/Significant Findings At Last Visit/Comments:      Quick DASH:  52%    L Shoulder ROM:  Flexion: 120 deg (AROM)  Abduction: 105 deg (AROM), shoulder shrug compensation   ER: 42 deg (PROM)    Functional IR: L5  Functional ER: reach L occiput    Palpation: no tenderness reported    LUE Strength:  Flexion: 4/5   Abduction: 4/5  ER: 4/5  IR: 5/5  Biceps: 5/5  Triceps: 5/5    Assessment:   Cheko Scott has completed 18 visits since the start of therapy on 22. He demonstrates good improvements with overhead mobility and is showing good improvements with external rotation. As he begins more active overhead motion noting increased shoulder shrug compensation. Has not returned to work as of yet due to concerns of lifting up to 30 lbs.  He is improving with his lifting abilities, but still struggles past 90 deg of flexion. Will continue to progress towards goals and address remaining deficits prior to return to work. Goal Status:  [x] Achieved [x] Partially Achieved  [] Not Achieved     Changes to goals:    Patient goals : improve ability to return to work and golf  Short term goals  Time Frame for Short term goals: 5 weeks  Short term goal 1: Pt demo I w/ HEP and pain management reports good compliance  Short term goal 2: Pt demo Quick DASH score improvement by 25% to increase ability to return to work duties MET  Short term goal 3: Pt demo WNL shoulder PROM in all directions with pain <1/10 to improve overhead reaching MET  Short term goal 4: Pt demo >4/5 shoulder and elbow strength in all directions and pain reported <1/10 to facilitate return to work Improving  Long term goals  Time Frame for Long term goals : 10 weeks  Long term goal 1: Pt demo ability to reach overhead x10 with 3 lbs to mimic reaching into an overhead cabinet Improving  Long term goal 2: Pt demo WNL shoulder AROM in all directions with pain <1/10 to improve ability to compelte his work duties Improving  Long term goal 3: Pt demo 5/5 shoulder and elbow strength in all directions and pain reported <1/10 to facilitate return to work Improving      Frequency/Duration:  # Days per week: [] 1 day # Weeks: [] 1 week [] 4 weeks [] 8 weeks     [x] 2 days   [] 2 weeks [] 5 weeks [] 10 weeks     [] 3 days   [] 3 weeks [x] 6 weeks [] 12 weeks       Rehab Potential: [] Excellent [x] Good [] Fair  [] Poor     Patient Status: [x] Continue per initial plan of Care     [] Patient now discharged     [x] Additional visits requested, Please re-certify for additional visits:      Requested frequency/duration:  2 /week for 4 weeks    If we are requesting more visits, we fully anticipate the patient's condition is expected to improve within the treatment timeframe we are requesting.     Electronically signed by:  Aida Leyva PT, DPT, AMOS 5/2/2022, 8:19 AM    If you have any questions or concerns, please don't hesitate to call.   Thank you for your referral.    Physician Signature:______________________ Date:______ Time: ________  By signing above, therapists plan is approved by physician

## 2022-05-04 ENCOUNTER — HOSPITAL ENCOUNTER (OUTPATIENT)
Dept: PHYSICAL THERAPY | Age: 57
Setting detail: THERAPIES SERIES
Discharge: HOME | End: 2022-05-04
Payer: COMMERCIAL

## 2022-05-04 PROCEDURE — 97110 THERAPEUTIC EXERCISES: CPT

## 2022-05-04 PROCEDURE — 97530 THERAPEUTIC ACTIVITIES: CPT

## 2022-05-04 NOTE — FLOWSHEET NOTE
Outpatient Physical Therapy  [place]           [x] Phone: [phone]   Fax: [phone]  [place]           [] Phone: [phone]   Fax: [phone]        Physical Therapy Daily Treatment Note  Date:  2022    Patient Name:  Cally Darling    :  1965  MRN: [id number]  Restrictions/Precautions: limit ER to 40 deg (4 weeks), no extension past body (4 weeks), no shoulder motion behind the back, no closed chain *See folder for [facility] protocol*  Diagnosis:   Diagnosis: L shoulder surg (biceps tenodesis without RC repair)  Date of Injury/Surgery: 22  Treatment Diagnosis: Treatment Diagnosis: Decreased LUE strength and mobility, limtied by pain    Insurance/Certification information: PT Insurance Information: [facility] $20 co-pay  Referring Physician:  Referring Practitioner: Dr. Angelic Mireles  Next Doctor Visit:  2022  Plan of care signed (Y/N):  YES, sent 22  Outcome Measure: Quick DASH: 52%  Visit# / total visits:     Pain level: 2/10   Goals:     Patient goals : improve ability to return to work and golf  Short term goals  Time Frame for Short term goals: 5 weeks  Short term goal 1: Pt demo I w/ HEP and pain management reports good compliance  Short term goal 2: Pt demo Quick DASH score improvement by 25% to increase ability to return to work duties MET  Short term goal 3: Pt demo WNL shoulder PROM in all directions with pain <1/10 to improve overhead reaching MET  Short term goal 4: Pt demo >4/5 shoulder and elbow strength in all directions and pain reported <1/10 to facilitate return to work Improving  Long term goals  Time Frame for Long term goals : 10 weeks  Long term goal 1: Pt demo ability to reach overhead x10 with 3 lbs to mimic reaching into an overhead cabinet Improving  Long term goal 2: Pt demo WNL shoulder AROM in all directions with pain <1/10 to improve ability to compelte his work duties Improving  Long term goal 3: Pt demo 5/5 shoulder and elbow strength in all directions and pain reported <1/10 to facilitate return to work Improving    Summary of Evaluation:  Pt is 49year old male who underwent a biceps tenodesis, rotator cuff debridement, and distal clavicle resection on 1/31/22. Pt now has difficulties reaching/lifting his arm, washing his back, shaving his head, toilet hygiene, work duties, and golfing. Pt demo deficits this date that include reduced shoulder/elbow ROM, reduced rotator cuff/scapular/biceps strength, decreased tolerance to active movement, WNL sensation, ongoing pain with rest/activity. Pt will benefit with PT services with shoulder/elbow PROM/AAROM/AROM, progressive rotator cuff/scapular/biceps strengthening, GH mobilizations, modalities as needed for pain management, manual/STM per protocol to return to PLOF. Pt prior to onset of current condition had no pain with able to complete full ADLs and work activities. Patient received education on their current pathology and how their condition effects them with their functional activities. Patient understood discussion and questions were answered. Patient understands their activity limitations and understands rational for treatment progression. Subjective:  Merlin Licea reports feels pretty good today and no increased soreness following     Any changes in Ambulatory Summary Sheet?   None      Objective:    COVID screening questions were asked and patient attested that there had been no contact or symptoms        Exercises: (No more than 4 columns)   Exercise/Equipment 4/25/22 #16 4/27/2022 #17 5/2/22 #18 5/4/22 #19     TM        WARM UP          Pulley's       UBE 2'/2' 2.5'/2.5'  4' 4'   TABLE       PROM of elbow/shoulder       *Wrist AROM       *Ball Squeezes       *Pendulums        *Scap Squeezes          [employer] Seated ER       Supine Cane Flexion       Seated table slides in flexion       Prone extension       ER isometric       SL Flex/ABD       Biceps Curl 8# 2x10 bilat 8# 10x2 B 8# 2x10 BUE 8# 2x10 BUE   STANDING       Roller on Wall       TRX Walk-Outs x10 10x x10 x10   Scaption Raise Front raise w/ 6# DB 3x5 Sudarshan from therapist Front raise w/ 6# DB 3x5 Sudarshan from therapist Front raise w/ 5# DB 3x5 Sudarshan from therapist Front raise w/ 5# DB 3x5 Sudarshan from therapist   Triceps Extension TB       Standing SB stretch into flexion       Wall saw       Wall Push-UP 2x10 at counter 10x2 at counter 2x10 counter Shuttle press 2x10 1C   OH Reach      2x10 active reach followed by wall walk 10x2  2x10 4# MB BUE 2x10 6# MB BUE   TB ER       Farmer's Carry       TRX Rows  2x10 10x2 2x10 2x10   Lifting Mechanics       Pull down w/ straight arms 2x10 t-bar on FM 23# 2x10 t-bar on FM 23# 2x10 t-bar on FM 23# --   Low fly  Bench supine 2x10 3# Bench supine 2x10 3# Bench supine 2x10 3# Bench supine 2x10 4#   Mid press up and out       Sled Push/Pull 100# 3 laps push/pull with # 3 laps push/pull with # 3 laps push/pull with # 3 laps push/pull with TRX   Bench press 2x10 10x2 w/ spot 3x10 add 10 lbs w/ spot 3x10 add 10 lbs w/ spot   FM ROW 2x10 60# 60# 10x2 ( use CC next time) 20# L, 30# R, SA 2x10 20# L, 30# R, SA 2x10   PROPRIOCEPTION       Plank Shoulder Taps  2x20 alt 10x2 alt -- Weight shifts in plank x10 ea side   SB \"I\", \"Y\", \"T\" x10 ea dir GSB GSB 10x ea dir GSB x10 ea dir    rebounder    x20 4# OH throws LUE                 MODALITIES       vaso -- --                Other Therapeutic Activities/Education:  Patient received education on their current pathology and how their condition effects them with their functional activities. Patient understood discussion and questions were answered. Patient understands their activity limitations and understands rational for treatment progression. Home Exercise Program:  HO issued, reviewed and discussed with patient. Pt agreed to comply.         Manual Treatments:  x 10'             Communication with other providers:  eval sent 2/22/22      Assessment: Wendy Perez demonstrates good tolerance to today's session. He is making good progress in lifting overhead and work related duties, but still not able to lift enough weight. Will continue to progress as tolerated. End of session: 3/10     Pt is 49year old male who underwent a biceps tenodesis, rotator cuff debridement, and distal clavicle resection on 1/31/22. Pt now has difficulties reaching/lifting his arm, washing his back, shaving his head, toilet hygiene, work duties, and golfing. Pt demo deficits this date that include reduced shoulder/elbow ROM, reduced rotator cuff/scapular/biceps strength, decreased tolerance to active movement, WNL sensation, ongoing pain with rest/activity. Pt will benefit with PT services with shoulder/elbow PROM/AAROM/AROM, progressive rotator cuff/scapular/biceps strengthening, GH mobilizations, modalities as needed for pain management, manual/STM per protocol to return to PLOF. Pt prior to onset of current condition had no pain with able to complete full ADLs and work activities. Patient received education on their current pathology and how their condition effects them with their functional activities. Patient understood discussion and questions were answered. Patient understands their activity limitations and understands rational for treatment progression.        Plan for Next Session:  --      Time In / Time Out:   9290-6854      Timed Code/Total Treatment Minutes:   39'  2 TA (25') 1 TE (20')     Next Progress Note due: 10th visit       Plan of Care Interventions:  [x] Therapeutic Exercise  [] Modalities:  [x] Therapeutic Activity     [] Ultrasound  [] Estim  [x] Gait Training      [] Cervical Traction [] Lumbar Traction  [x] Neuromuscular Re-education    [] Cold/hotpack [] Iontophoresis   [x] Instruction in HEP      [x] Vasopneumatic   [] Dry Needling    [x] Manual Therapy               [] Aquatic Therapy              Electronically signed by:Jailyn Rubin, PT, DPT, CSCS    5/4/2022,8:34 AM    5/4/2022,8:34 AM

## 2022-05-05 ENCOUNTER — TELEPHONE (OUTPATIENT)
Dept: ORTHOPEDIC SURGERY | Age: 57
End: 2022-05-05

## 2022-05-05 NOTE — TELEPHONE ENCOUNTER
Disability paperwork faxed to [place] @ [phone]. Patient requested a call once this has been completed. Patient has been informed. Nothing further needed at this time. Paperwork will be scanned into media.

## 2022-05-09 ENCOUNTER — HOSPITAL ENCOUNTER (OUTPATIENT)
Dept: PHYSICAL THERAPY | Age: 57
Setting detail: THERAPIES SERIES
Discharge: HOME | End: 2022-05-09
Payer: COMMERCIAL

## 2022-05-09 PROCEDURE — 97530 THERAPEUTIC ACTIVITIES: CPT

## 2022-05-09 PROCEDURE — 97110 THERAPEUTIC EXERCISES: CPT

## 2022-05-09 NOTE — FLOWSHEET NOTE
Outpatient Physical Therapy  [place]           [x] Phone: [phone]   Fax: [phone]  [place]           [] Phone: [phone]   Fax: [phone]        Physical Therapy Daily Treatment Note  Date:  2022    Patient Name:  Harleen Castillo    :  1965  MRN: [id number]  Restrictions/Precautions: limit ER to 40 deg (4 weeks), no extension past body (4 weeks), no shoulder motion behind the back, no closed chain *See folder for [facility] protocol*  Diagnosis:   Diagnosis: L shoulder surg (biceps tenodesis without RC repair)  Date of Injury/Surgery: 22  Treatment Diagnosis: Treatment Diagnosis: Decreased LUE strength and mobility, limtied by pain    Insurance/Certification information: PT Insurance Information: [employer] $20 co-pay  Referring Physician:  Referring Practitioner: Dr. Porfirio Marie  Next Doctor Visit:  2022  Plan of care signed (Y/N):  YES, sent 22  Outcome Measure: Quick DASH: 52%  Visit# / total visits:     Pain level: 2/10   Goals:     Patient goals : improve ability to return to work and golf  Short term goals  Time Frame for Short term goals: 5 weeks  Short term goal 1: Pt demo I w/ HEP and pain management reports good compliance  Short term goal 2: Pt demo Quick DASH score improvement by 25% to increase ability to return to work duties MET  Short term goal 3: Pt demo WNL shoulder PROM in all directions with pain <1/10 to improve overhead reaching MET  Short term goal 4: Pt demo >4/5 shoulder and elbow strength in all directions and pain reported <1/10 to facilitate return to work Improving  Long term goals  Time Frame for Long term goals : 10 weeks  Long term goal 1: Pt demo ability to reach overhead x10 with 3 lbs to mimic reaching into an overhead cabinet Improving  Long term goal 2: Pt demo WNL shoulder AROM in all directions with pain <1/10 to improve ability to compelte his work duties Improving  Long term goal 3: Pt demo 5/5 shoulder and elbow strength in all directions and pain reported <1/10 to facilitate return to work Improving    Summary of Evaluation:  Pt is 49year old male who underwent a biceps tenodesis, rotator cuff debridement, and distal clavicle resection on 1/31/22. Pt now has difficulties reaching/lifting his arm, washing his back, shaving his head, toilet hygiene, work duties, and golfing. Pt demo deficits this date that include reduced shoulder/elbow ROM, reduced rotator cuff/scapular/biceps strength, decreased tolerance to active movement, WNL sensation, ongoing pain with rest/activity. Pt will benefit with PT services with shoulder/elbow PROM/AAROM/AROM, progressive rotator cuff/scapular/biceps strengthening, GH mobilizations, modalities as needed for pain management, manual/STM per protocol to return to PLOF. Pt prior to onset of current condition had no pain with able to complete full ADLs and work activities. Patient received education on their current pathology and how their condition effects them with their functional activities. Patient understood discussion and questions were answered. Patient understands their activity limitations and understands rational for treatment progression. 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 He continues to be compliant with his HEP everyday. Any changes in Ambulatory Summary Sheet?   None      Objective:    COVID screening questions were asked and patient attested that there had been no contact or symptoms    ER PROM: 48 deg    Exercises: (No more than 4 columns)   Exercise/Equipment 5/2/22 #18 5/4/22 #19 5/9/22 #20     TM       WARM UP         Pulley's      UBE 4' 4' 4'   TABLE      PROM of elbow/shoulder      *Wrist AROM      *Ball Squeezes      *Pendulums       *Scap Squeezes         [employer] Seated ER      Supine Cane Flexion      Seated table slides in flexion      Prone extension      ER isometric      SL Flex/ABD   2x10 3#   Biceps Curl 8# 2x10 BUE 8# 2x10 BUE    STANDING      Roller on Wall      TRX Walk-Outs x10 x10 x10   Scaption Raise Front raise w/ 5# DB 3x5 Sudarshan from therapist Front raise w/ 5# DB 3x5 Sudarshan from therapist Front raise w/ 5# DB 3x5 Sudarshan from therapist   Triceps Extension TB      Standing SB stretch into flexion      Wall saw      Wall Push-UP 2x10 counter Shuttle press 2x10 1C 2x10 counter   OH Reach      2x10 4# MB BUE 2x10 6# MB BUE 2x10 6# MB BUE   TB ER      Farmer's Carry      TRX Rows  2x10 2x10 2x10   Lifting Mechanics      Pull down w/ straight arms 2x10 t-bar on FM 23# -- 2x10 SA 10#   Low fly  Bench supine 2x10 3# Bench supine 2x10 4# 2# standing low fly   Mid press up and out      Sled Push/Pull 100# 3 laps push/pull with # 3 laps push/pull with TRX --   Bench press 3x10 add 10 lbs w/ spot 3x10 add 10 lbs w/ spot 3x10 add 10 lbs w/ spot   FM ROW 20# L, 30# R, SA 2x10 20# L, 30# R, SA 2x10 Bent over row 2x10 25# KB    20# L, 30# R, SA 2x10       PROPRIOCEPTION      Plank Shoulder Taps  -- Weight shifts in plank x10 ea side --   SB \"I\", \"Y\", \"T\" GSB x10 ea dir     rebounder  x20 4# OH throws LUE --               MODALITIES      vaso                Other Therapeutic Activities/Education:  Patient received education on their current pathology and how their condition effects them with their functional activities. Patient understood discussion and questions were answered. Patient understands their activity limitations and understands rational for treatment progression. Home Exercise Program:  HO issued, reviewed and discussed with patient. Pt agreed to comply. Manual Treatments:  x 10'             Communication with other providers:  joséal sent 2/22/22      Assessment:   Ty Sanchez demonstrates good tolerance to today's session. He had some increased soreness therefore held on some of the closed chain strengthening.   End of session: 1/10     Pt is 49year old male who underwent a biceps tenodesis, rotator cuff debridement, and distal clavicle resection on 1/31/22. Pt now has difficulties reaching/lifting his arm, washing his back, shaving his head, toilet hygiene, work duties, and golfing. Pt demo deficits this date that include reduced shoulder/elbow ROM, reduced rotator cuff/scapular/biceps strength, decreased tolerance to active movement, WNL sensation, ongoing pain with rest/activity. Pt will benefit with PT services with shoulder/elbow PROM/AAROM/AROM, progressive rotator cuff/scapular/biceps strengthening, GH mobilizations, modalities as needed for pain management, manual/STM per protocol to return to PLOF. Pt prior to onset of current condition had no pain with able to complete full ADLs and work activities. Patient received education on their current pathology and how their condition effects them with their functional activities. Patient understood discussion and questions were answered. Patient understands their activity limitations and understands rational for treatment progression.        Plan for Next Session:  --      Time In / Time Out:   3951-1188      Timed Code/Total Treatment Minutes:   39'  2 TA (25') 1 TE (20')     Next Progress Note due: 10th visit       Plan of Care Interventions:  [x] Therapeutic Exercise  [] Modalities:  [x] Therapeutic Activity     [] Ultrasound  [] Estim  [x] Gait Training      [] Cervical Traction [] Lumbar Traction  [x] Neuromuscular Re-education    [] Cold/hotpack [] Iontophoresis   [x] Instruction in HEP      [x] Vasopneumatic   [] Dry Needling    [x] Manual Therapy               [] Aquatic Therapy              Electronically signed by:Jailyn Prajapati, PT, DPT, CSCS    5/9/2022,8:27 AM    5/9/2022,8:27 AM

## 2022-05-11 ENCOUNTER — HOSPITAL ENCOUNTER (OUTPATIENT)
Dept: PHYSICAL THERAPY | Age: 57
Discharge: HOME | End: 2022-05-11

## 2022-05-11 NOTE — FLOWSHEET NOTE
Physical Therapy  Cancellation/No-show Note  Patient Name:  Gabby Randle  :  1965   Date:  2022  Cancelled visits to date: 2  No-shows to date: 0    For today's appointment patient:  [x]  Cancelled  []  Rescheduled appointment  []  No-show     Reason given by patient:  []  Patient ill  []  Conflicting appointment  []  No transportation    []  Conflict with work  []  No reason given  [x]  Other:     Comments:  Poison ivy on hands    Electronically signed by:  Carrie Epperson     2022,9:33 AM

## 2022-05-16 ENCOUNTER — HOSPITAL ENCOUNTER (OUTPATIENT)
Dept: PHYSICAL THERAPY | Age: 57
Setting detail: THERAPIES SERIES
Discharge: HOME | End: 2022-05-16
Payer: COMMERCIAL

## 2022-05-16 PROCEDURE — 97530 THERAPEUTIC ACTIVITIES: CPT

## 2022-05-16 PROCEDURE — 97110 THERAPEUTIC EXERCISES: CPT

## 2022-05-16 NOTE — FLOWSHEET NOTE
Outpatient Physical Therapy  [place]           [x] Phone: [phone]   Fax: [phone]  [place]           [] Phone: [phone]   Fax: [phone]        Physical Therapy Daily Treatment Note  Date:  2022    Patient Name:  Pop Castro    :  1965  MRN: [id number]  Restrictions/Precautions: limit ER to 40 deg (4 weeks), no extension past body (4 weeks), no shoulder motion behind the back, no closed chain *See folder for [facility] protocol*  Diagnosis:   Diagnosis: L shoulder surg (biceps tenodesis without RC repair)  Date of Injury/Surgery: 22  Treatment Diagnosis: Treatment Diagnosis: Decreased LUE strength and mobility, limtied by pain    Insurance/Certification information: PT Insurance Information: [employer] $20 co-pay  Referring Physician:  Referring Practitioner: Dr. Jackson Hearn  Next Doctor Visit:  2022  Plan of care signed (Y/N):  YES, sent 22  Outcome Measure: Quick DASH: 52%  Visit# / total visits:     Pain level: 2/10 achy  Goals:     Patient goals : improve ability to return to work and golf  Short term goals  Time Frame for Short term goals: 5 weeks  Short term goal 1: Pt demo I w/ HEP and pain management reports good compliance  Short term goal 2: Pt demo Quick DASH score improvement by 25% to increase ability to return to work duties MET  Short term goal 3: Pt demo WNL shoulder PROM in all directions with pain <1/10 to improve overhead reaching MET  Short term goal 4: Pt demo >4/5 shoulder and elbow strength in all directions and pain reported <1/10 to facilitate return to work Improving  Long term goals  Time Frame for Long term goals : 10 weeks  Long term goal 1: Pt demo ability to reach overhead x10 with 3 lbs to mimic reaching into an overhead cabinet Improving  Long term goal 2: Pt demo WNL shoulder AROM in all directions with pain <1/10 to improve ability to compelte his work duties Improving  Long term goal 3: Pt demo 5/5 shoulder and elbow strength in all directions and pain reported <1/10 to facilitate return to work Improving    Summary of Evaluation:  Pt is 49year old male who underwent a biceps tenodesis, rotator cuff debridement, and distal clavicle resection on 1/31/22. Pt now has difficulties reaching/lifting his arm, washing his back, shaving his head, toilet hygiene, work duties, and golfing. Pt demo deficits this date that include reduced shoulder/elbow ROM, reduced rotator cuff/scapular/biceps strength, decreased tolerance to active movement, WNL sensation, ongoing pain with rest/activity. Pt will benefit with PT services with shoulder/elbow PROM/AAROM/AROM, progressive rotator cuff/scapular/biceps strengthening, GH mobilizations, modalities as needed for pain management, manual/STM per protocol to return to PLOF. Pt prior to onset of current condition had no pain with able to complete full ADLs and work activities. Patient received education on their current pathology and how their condition effects them with their functional activities. Patient understood discussion and questions were answered. Patient understands their activity limitations and understands rational for treatment progression. Subjective: Pt stated that his L shoulder was 2/10 achy pain. Any changes in Ambulatory Summary Sheet?   None      Objective:    COVID screening questions were asked and patient attested that there had been no contact or symptoms        Exercises: (No more than 4 columns)   Exercise/Equipment 5/2/22 #18 5/4/22 #19 5/9/22 #20 5/16/2022 #21     TM        WARM UP          Pulley's       UBE 4' 4' 4' 4'    TABLE       PROM of elbow/shoulder       *Wrist AROM       *Ball Squeezes       *Pendulums        *Scap Squeezes          [employer] Seated ER       Supine Cane Flexion       Seated table slides in flexion       Prone extension       ER isometric       SL Flex/ABD   2x10 3# 3# 10x2   Biceps Curl 8# 2x10 BUE 8# 2x10 BUE     STANDING       Roller on Wall       TRX Walk-Outs x10 x10 x10 10x   Scaption Raise Front raise w/ 5# DB 3x5 Sudarshan from therapist Front raise w/ 5# DB 3x5 Sudarshan from therapist Front raise w/ 5# DB 3x5 Sudarshan from therapist Front raise w/ 5# DB 3x5 Sudarshan from therapist   Triceps Extension TB       Standing SB stretch into flexion       Wall saw       Wall Push-UP 2x10 counter Shuttle press 2x10 1C 2x10 counter 10X2 COUNTER   OH Reach      2x10 4# MB BUE 2x10 6# MB BUE 2x10 6# MB BUE 10# MB 10x2   TB ER       Farmer's Carry       TRX Rows  2x10 2x10 2x10 --   Lifting Mechanics       Pull down w/ straight arms 2x10 t-bar on FM 23# -- 2x10 SA 10# --   Low fly  Bench supine 2x10 3# Bench supine 2x10 4# 2# standing low fly Bench supine 2x10 5#   Mid press up and out       Sled Push/Pull 100# 3 laps push/pull with # 3 laps push/pull with TRX --    Bench press 3x10 add 10 lbs w/ spot 3x10 add 10 lbs w/ spot 3x10 add 10 lbs w/ spot 3x10 add 10 lbs w/ spot   FM ROW 20# L, 30# R, SA 2x10 20# L, 30# R, SA 2x10 Bent over row 2x10 25# KB    20# L, 30# R, SA 2x10     Bent over row 2x10 25# KB    20# L, 30# R, SA 2x10   PROPRIOCEPTION       Plank Shoulder Taps  -- Weight shifts in plank x10 ea side --    SB \"I\", \"Y\", \"T\" GSB x10 ea dir      rebounder  x20 4# OH throws LUE --                  MODALITIES       vaso                  Other Therapeutic Activities/Education:  Patient received education on their current pathology and how their condition effects them with their functional activities. Patient understood discussion and questions were answered. Patient understands their activity limitations and understands rational for treatment progression. Home Exercise Program:  HO issued, reviewed and discussed with patient. Pt agreed to comply. Manual Treatments:  x 10'             Communication with other providers:  gamaliel sent 2/22/22      Assessment:   Pt tolerated treatment fairly well. Pt continues to make nice gains with strength and ROM.  Pt

## 2022-05-23 ENCOUNTER — HOSPITAL ENCOUNTER (OUTPATIENT)
Dept: PHYSICAL THERAPY | Age: 57
Setting detail: THERAPIES SERIES
Discharge: HOME | End: 2022-05-23
Payer: COMMERCIAL

## 2022-05-23 PROCEDURE — 97530 THERAPEUTIC ACTIVITIES: CPT

## 2022-05-23 PROCEDURE — 97110 THERAPEUTIC EXERCISES: CPT

## 2022-05-23 NOTE — FLOWSHEET NOTE
Outpatient Physical Therapy  [place]           [x] Phone: [phone]   Fax: [phone]  [place]           [] Phone: [phone]   Fax: [phone]        Physical Therapy Daily Treatment Note  Date:  2022    Patient Name:  Paul Morin    :  1965  MRN: [id number]  Restrictions/Precautions: limit ER to 40 deg (4 weeks), no extension past body (4 weeks), no shoulder motion behind the back, no closed chain *See folder for Samson protocol*  Diagnosis:   Diagnosis: L shoulder surg (biceps tenodesis without RC repair)  Date of Injury/Surgery: 22  Treatment Diagnosis: Treatment Diagnosis: Decreased LUE strength and mobility, limtied by pain    Insurance/Certification information: PT Insurance Information: [employer] $20 co-pay  Referring Physician:  Referring Practitioner: Dr. Clinton Brewer Doctor Visit:  2022  Plan of care signed (Y/N):  YES, sent 22  Outcome Measure: Quick DASH: 52%  Visit# / total visits:     Pain level: 0/10  Goals:     Patient goals : improve ability to return to work and golf  Short term goals  Time Frame for Short term goals: 5 weeks  Short term goal 1: Pt demo I w/ HEP and pain management reports good compliance  Short term goal 2: Pt demo Quick DASH score improvement by 25% to increase ability to return to work duties MET  Short term goal 3: Pt demo WNL shoulder PROM in all directions with pain <1/10 to improve overhead reaching MET  Short term goal 4: Pt demo >4/5 shoulder and elbow strength in all directions and pain reported <1/10 to facilitate return to work Improving  Long term goals  Time Frame for Long term goals : 10 weeks  Long term goal 1: Pt demo ability to reach overhead x10 with 3 lbs to mimic reaching into an overhead cabinet Improving  Long term goal 2: Pt demo WNL shoulder AROM in all directions with pain <1/10 to improve ability to compelte his work duties Improving  Long term goal 3: Pt demo 5/5 shoulder and elbow strength in all directions and pain reported <1/10 to facilitate return to work Improving    Summary of Evaluation:  Pt is 49year old male who underwent a biceps tenodesis, rotator cuff debridement, and distal clavicle resection on 1/31/22. Pt now has difficulties reaching/lifting his arm, washing his back, shaving his head, toilet hygiene, work duties, and golfing. Pt demo deficits this date that include reduced shoulder/elbow ROM, reduced rotator cuff/scapular/biceps strength, decreased tolerance to active movement, WNL sensation, ongoing pain with rest/activity. Pt will benefit with PT services with shoulder/elbow PROM/AAROM/AROM, progressive rotator cuff/scapular/biceps strengthening, GH mobilizations, modalities as needed for pain management, manual/STM per protocol to return to PLOF. Pt prior to onset of current condition had no pain with able to complete full ADLs and work activities. Patient received education on their current pathology and how their condition effects them with their functional activities. Patient understood discussion and questions were answered. Patient understands their activity limitations and understands rational for treatment progression. Subjective: Pt reports no pain upon arrival. Has been trying to be more aware of his soreness levels and appropriate rest breaks. Any changes in Ambulatory Summary Sheet?   None      Objective:    COVID screening questions were asked and patient attested that there had been no contact or symptoms        Exercises: (No more than 4 columns)   Exercise/Equipment 5/9/22 #20 5/16/2022 #21 5/23/22 #22     TM       WARM UP         Pulley's      UBE 4' 4'  4'   TABLE      PROM of elbow/shoulder      *Wrist AROM      *Ball Squeezes      *Pendulums       *Scap Squeezes         [employer] Seated ER      Supine Cane Flexion      Seated table slides in flexion      Prone extension      ER isometric      SL Flex/ABD 2x10 3# 3# 10x2    Biceps Curl      STANDING Roller on Wall      TRX Walk-Outs x10 10x x10   Scaption Raise Front raise w/ 5# DB 3x5 Sudarshan from therapist Front raise w/ 5# DB 3x5 Sudarshan from therapist Front raise w/ 5# DB 2x7, no assist      Triceps Extension TB      Standing SB stretch into flexion      Wall saw      Wall Push-UP 2x10 counter 10X2 COUNTER 2x10 counter   OH Reach      2x10 6# MB BUE 10# MB 10x2 10# MB 2x10, 3\"   TB ER      Farmer's Carry      TRX Rows  2x10 -- 2x10    Lifting Mechanics      Pull down w/ straight arms 2x10 SA 10# -- 2x10 20# t-bar DA   Low fly  2# standing low fly Bench supine 2x10 5# Bench supine 2x10 5#   Mid press up and out      Sled Push/Pull --     Bench press 3x10 add 10 lbs w/ spot 3x10 add 10 lbs w/ spot 3x10, add 10 lbs    FM ROW Bent over row 2x10 25# KB    20# L, 30# R, SA 2x10     Bent over row 2x10 25# KB    20# L, 30# R, SA 2x10 Bent over row 2x10 25# KB    20# L, 30# R, SA 2x10   Statue of [place]   3# length of turf x2         PROPRIOCEPTION      Plank Shoulder Taps  --  2x20 alt   SB \"I\", \"Y\", \"T\"   2x10 ea   rebounder --                 MODALITIES      vaso   --             Other Therapeutic Activities/Education:  Patient received education on their current pathology and how their condition effects them with their functional activities. Patient understood discussion and questions were answered. Patient understands their activity limitations and understands rational for treatment progression. Home Exercise Program:  HO issued, reviewed and discussed with patient. Pt agreed to comply. Manual Treatments:  x 10'             Communication with other providers:  gamaliel sent 2/22/22      Assessment:   Pt tolerated treatment fairly well. He continues to make good gains in strength  and ROM. Able to lift 5# in front raise independently for 7 repetitions. Will continue at 1x per week until follow up with the doctor.    End of session: 0/10     Pt is 49year old male who underwent a biceps tenodesis, rotator cuff debridement, and distal clavicle resection on 1/31/22. Pt now has difficulties reaching/lifting his arm, washing his back, shaving his head, toilet hygiene, work duties, and golfing. Pt demo deficits this date that include reduced shoulder/elbow ROM, reduced rotator cuff/scapular/biceps strength, decreased tolerance to active movement, WNL sensation, ongoing pain with rest/activity. Pt will benefit with PT services with shoulder/elbow PROM/AAROM/AROM, progressive rotator cuff/scapular/biceps strengthening, GH mobilizations, modalities as needed for pain management, manual/STM per protocol to return to PLOF. Pt prior to onset of current condition had no pain with able to complete full ADLs and work activities. Patient received education on their current pathology and how their condition effects them with their functional activities. Patient understood discussion and questions were answered. Patient understands their activity limitations and understands rational for treatment progression.        Plan for Next Session:  --      Time In / Time Out:  0964-9932      Timed Code/Total Treatment Minutes:   39'      2 TE (25') 1 TA (20')     Next Progress Note due: 10th visit       Plan of Care Interventions:  [x] Therapeutic Exercise  [] Modalities:  [x] Therapeutic Activity     [] Ultrasound  [] Estim  [x] Gait Training      [] Cervical Traction [] Lumbar Traction  [x] Neuromuscular Re-education    [] Cold/hotpack [] Iontophoresis   [x] Instruction in HEP      [x] Vasopneumatic   [] Dry Needling    [x] Manual Therapy               [] Aquatic Therapy              Electronically signed by:Jailyn Valverde, PT, DPT, CSCS  5/23/2022,8:33 AM

## 2022-06-15 ENCOUNTER — HOSPITAL ENCOUNTER (OUTPATIENT)
Dept: PHYSICAL THERAPY | Age: 57
Setting detail: THERAPIES SERIES
Discharge: HOME | End: 2022-06-15
Payer: COMMERCIAL

## 2022-06-15 PROCEDURE — 97110 THERAPEUTIC EXERCISES: CPT

## 2022-06-15 PROCEDURE — 97530 THERAPEUTIC ACTIVITIES: CPT

## 2022-06-15 NOTE — FLOWSHEET NOTE
Outpatient Physical Therapy  [place]           [x] Phone: [phone]   Fax: [phone]  [place]           [] Phone: [phone]   Fax: [phone]        Physical Therapy Daily Treatment Note  Date:  6/15/2022    Patient Name:  Asher Friedman    :  1965  MRN: [id number]  Restrictions/Precautions: limit ER to 40 deg (4 weeks), no extension past body (4 weeks), no shoulder motion behind the back, no closed chain *See folder for Samson protocol*  Diagnosis:   Diagnosis: L shoulder surg (biceps tenodesis without RC repair)  Date of Injury/Surgery: 22  Treatment Diagnosis: Treatment Diagnosis: Decreased LUE strength and mobility, limtied by pain    Insurance/Certification information: PT Insurance Information: [facility] $20 co-pay  Referring Physician:  Referring Practitioner: Dr. Marcos Resendiz  Next Doctor Visit:  2022  Plan of care signed (Y/N):  YES, sent 22  Outcome Measure: Quick DASH: 52%  Visit# / total visits:     Pain level: 0/10  Goals:     Patient goals : improve ability to return to work and golf  Short term goals  Time Frame for Short term goals: 5 weeks  Short term goal 1: Pt demo I w/ HEP and pain management reports good compliance  Short term goal 2: Pt demo Quick DASH score improvement by 25% to increase ability to return to work duties MET  Short term goal 3: Pt demo WNL shoulder PROM in all directions with pain <1/10 to improve overhead reaching MET  Short term goal 4: Pt demo >4/5 shoulder and elbow strength in all directions and pain reported <1/10 to facilitate return to work Improving  Long term goals  Time Frame for Long term goals : 10 weeks  Long term goal 1: Pt demo ability to reach overhead x10 with 3 lbs to mimic reaching into an overhead cabinet Improving  Long term goal 2: Pt demo WNL shoulder AROM in all directions with pain <1/10 to improve ability to compelte his work duties Improving  Long term goal 3: Pt demo 5/5 shoulder and elbow strength in all directions and pain reported <1/10 to facilitate return to work Improving    Summary of Evaluation:  Pt is 49year old male who underwent a biceps tenodesis, rotator cuff debridement, and distal clavicle resection on 1/31/22. Pt now has difficulties reaching/lifting his arm, washing his back, shaving his head, toilet hygiene, work duties, and golfing. Pt demo deficits this date that include reduced shoulder/elbow ROM, reduced rotator cuff/scapular/biceps strength, decreased tolerance to active movement, WNL sensation, ongoing pain with rest/activity. Pt will benefit with PT services with shoulder/elbow PROM/AAROM/AROM, progressive rotator cuff/scapular/biceps strengthening, GH mobilizations, modalities as needed for pain management, manual/STM per protocol to return to PLOF. Pt prior to onset of current condition had no pain with able to complete full ADLs and work activities. Patient received education on their current pathology and how their condition effects them with their functional activities. Patient understood discussion and questions were answered. Patient understands their activity limitations and understands rational for treatment progression. Subjective: Pt reports no pain upon arrival. Has been trying to be more aware of his soreness levels and appropriate rest breaks. Any changes in Ambulatory Summary Sheet?   None      Objective:    COVID screening questions were asked and patient attested that there had been no contact or symptoms        Exercises: (No more than 4 columns)   Exercise/Equipment 5/9/22 #20 5/16/2022 #21 5/23/22 #22 6/15/22 #23     TM        WARM UP          Pulley's       UBE 4' 4'  4' 4'   TABLE       PROM of elbow/shoulder       *Wrist AROM       *Ball Squeezes       *Pendulums        *Scap Squeezes          [employer] Seated ER       Supine Cane Flexion       Seated table slides in flexion       Prone extension       ER isometric       SL Flex/ABD 2x10 3# 3# 10x2     Biceps Curl       STANDING       Roller on Wall       TRX Walk-Outs x10 10x x10    Scaption Raise Front raise w/ 5# DB 3x5 Sudarshan from therapist Front raise w/ 5# DB 3x5 Sudarshan from therapist Front raise w/ 5# DB 2x7, no assist    Front raise w/ 5# 2x10, no assist  *try 6 lbs next    scaption raise 3# 2x10   Triceps Extension TB       Standing SB stretch into flexion       Wall saw       Wall Push-UP 2x10 counter 10X2 COUNTER 2x10 counter 2x10 counter   OH Reach      2x10 6# MB BUE 10# MB 10x2 10# MB 2x10, 3\" 10# MB 2x10 3\" hold away from wall   TB ER       Farmer's Carry       TRX Rows  2x10 -- 2x10     Lifting Mechanics       Pull down w/ straight arms 2x10 SA 10# -- 2x10 20# t-bar DA 2x10 20# t-bar   Low fly  2# standing low fly Bench supine 2x10 5# Bench supine 2x10 5# --   Mid press up and out       Sled Push/Pull --      Bench press 3x10 add 10 lbs w/ spot 3x10 add 10 lbs w/ spot 3x10, add 10 lbs  3x10, 10 lbs added   FM ROW Bent over row 2x10 25# KB    20# L, 30# R, SA 2x10     Bent over row 2x10 25# KB    20# L, 30# R, SA 2x10 Bent over row 2x10 25# KB    20# L, 30# R, SA 2x10 Bent Over Row 25# KB    FM cable 33#    Statue of [place]   3# length of turf x2 3# length of turf x2 laps          PROPRIOCEPTION       Plank Shoulder Taps  --  2x20 alt 2x10 alt   SB \"I\", \"Y\", \"T\"   2x10 ea    rebounder --                    MODALITIES       vaso   --               Other Therapeutic Activities/Education:  Patient received education on their current pathology and how their condition effects them with their functional activities. Patient understood discussion and questions were answered. Patient understands their activity limitations and understands rational for treatment progression. Home Exercise Program:  HO issued, reviewed and discussed with patient. Pt agreed to comply.         Manual Treatments:  x 10'             Communication with other providers:  joséal sent 2/22/22      Assessment:   Pt tolerated treatment well and

## 2022-06-22 ENCOUNTER — HOSPITAL ENCOUNTER (OUTPATIENT)
Dept: PHYSICAL THERAPY | Age: 57
Setting detail: THERAPIES SERIES
Discharge: HOME | End: 2022-06-22
Payer: COMMERCIAL

## 2022-06-22 PROCEDURE — 97110 THERAPEUTIC EXERCISES: CPT

## 2022-06-22 PROCEDURE — 97530 THERAPEUTIC ACTIVITIES: CPT

## 2022-06-22 NOTE — FLOWSHEET NOTE
Outpatient Physical Therapy  [place]           [x] Phone: [phone]   Fax: [phone]  [place]           [] Phone: [phone]   Fax: [phone]        Physical Therapy Daily Treatment Note  Date:  2022    Patient Name:  Kimberly Jensen    :  1965  MRN: [id number]  Restrictions/Precautions: limit ER to 40 deg (4 weeks), no extension past body (4 weeks), no shoulder motion behind the back, no closed chain *See folder for Samson protocol*  Diagnosis:   Diagnosis: L shoulder surg (biceps tenodesis without RC repair)  Date of Injury/Surgery: 22  Treatment Diagnosis: Treatment Diagnosis: Decreased LUE strength and mobility, limtied by pain    Insurance/Certification information: PT Insurance Information: [facility] $20 co-pay  Referring Physician:  Referring Practitioner: Dr. Isaac Rios  Next Doctor Visit:  2022  Plan of care signed (Y/N):  YES, sent 22  Outcome Measure: Quick DASH: 52%  Visit# / total visits:     Pain level: 0/10  Goals:     Patient goals : improve ability to return to work and golf  Short term goals  Time Frame for Short term goals: 5 weeks  Short term goal 1: Pt demo I w/ HEP and pain management reports good compliance  Short term goal 2: Pt demo Quick DASH score improvement by 25% to increase ability to return to work duties MET  Short term goal 3: Pt demo WNL shoulder PROM in all directions with pain <1/10 to improve overhead reaching MET  Short term goal 4: Pt demo >4/5 shoulder and elbow strength in all directions and pain reported <1/10 to facilitate return to work Improving  Long term goals  Time Frame for Long term goals : 10 weeks  Long term goal 1: Pt demo ability to reach overhead x10 with 3 lbs to mimic reaching into an overhead cabinet Improving  Long term goal 2: Pt demo WNL shoulder AROM in all directions with pain <1/10 to improve ability to compelte his work duties Improving  Long term goal 3: Pt demo 5/5 shoulder and elbow strength in all directions and pain reported <1/10 to facilitate return to work Improving    Summary of Evaluation:  Pt is 49year old male who underwent a biceps tenodesis, rotator cuff debridement, and distal clavicle resection on 1/31/22. Pt now has difficulties reaching/lifting his arm, washing his back, shaving his head, toilet hygiene, work duties, and golfing. Pt demo deficits this date that include reduced shoulder/elbow ROM, reduced rotator cuff/scapular/biceps strength, decreased tolerance to active movement, WNL sensation, ongoing pain with rest/activity. Pt will benefit with PT services with shoulder/elbow PROM/AAROM/AROM, progressive rotator cuff/scapular/biceps strengthening, GH mobilizations, modalities as needed for pain management, manual/STM per protocol to return to PLOF. Pt prior to onset of current condition had no pain with able to complete full ADLs and work activities. Patient received education on their current pathology and how their condition effects them with their functional activities. Patient understood discussion and questions were answered. Patient understands their activity limitations and understands rational for treatment progression. Subjective: Pt reports no pain and some minimal stiffness upon arrival.     Any changes in Ambulatory Summary Sheet?   None      Objective:    COVID screening questions were asked and patient attested that there had been no contact or symptoms    Shoulder Flexion AROM: 140 deg      Exercises: (No more than 4 columns)   Exercise/Equipment 5/23/22 #22 6/15/22 #23 6/22/22 #24     TM       WARM UP         Pulley's      UBE 4' 4' 5'   TABLE      PROM of elbow/shoulder      *Wrist AROM      *Ball Squeezes      *Pendulums       *Scap Squeezes         [place] Seated ER      Supine Cane Flexion      Seated table slides in flexion      Prone extension      ER isometric      SL Flex/ABD      Biceps Curl      STANDING      Roller on Wall      TRX Walk-Outs x10 Scaption Raise Front raise w/ 5# DB 2x7, no assist    Front raise w/ 5# 2x10, no assist  *try 6 lbs next    scaption raise 3# 2x10 Front raise 2x10 6#    Scaption raise 3# 2x10   Triceps Extension TB      Standing SB stretch into flexion      Wall saw      Wall Push-UP 2x10 counter 2x10 counter 2x10 table    OH Reach      10# MB 2x10, 3\" 10# MB 2x10 3\" hold away from wall 12# MB 2x10   TB ER      Farmer's Carry   25# 2 laps on turf   TRX Rows  2x10   2x10   Lifting Mechanics      Pull down w/ straight arms 2x10 20# t-bar DA 2x10 20# t-bar 2x10 33# t-bar   Low fly  Bench supine 2x10 5# --    Mid press up and out      Sled Push/Pull      Bench press 3x10, add 10 lbs  3x10, 10 lbs added 3x10 30 lbs added (15/15)   FM ROW Bent over row 2x10 25# KB    20# L, 30# R, SA 2x10 Bent Over Row 25# KB    FM cable 33#  FM cable both arms 2x10 37#   Stat[place] 3# length of turf x2 3# length of turf x2 laps 3# length of turf x2 laps   Crate Lifts   next   PROPRIOCEPTION      Plank Shoulder Taps  2x20 alt 2x10 alt 2x10 alt    SB \"I\", \"Y\", \"T\" 2x10 ea     rebounder                  MODALITIES      vaso --               Other Therapeutic Activities/Education:  Patient received education on their current pathology and how their condition effects them with their functional activities. Patient understood discussion and questions were answered. Patient understands their activity limitations and understands rational for treatment progression. Home Exercise Program:  HO issued, reviewed and discussed with patient. Pt agreed to comply. Manual Treatments:  x 10'             Communication with other providers:  gamaliel sent 2/22/22      Assessment:   Emerson Cleaning demonstrates good tolerance to today's. Will follow-up with [facility] on Tuesday June 22nd. Feels he is not ready to return to work yet, and would like to continue to progress through July.  He continues to make slow steady gains in therapy, but is still not able to lift the required amount to complete his work tasks (approx. 20-30 lbs). Will continue to progress lifting, overhead reaching, and closed chain exercises. End of session: 0/10     Pt is 49year old male who underwent a biceps tenodesis, rotator cuff debridement, and distal clavicle resection on 1/31/22. Pt now has difficulties reaching/lifting his arm, washing his back, shaving his head, toilet hygiene, work duties, and golfing. Pt demo deficits this date that include reduced shoulder/elbow ROM, reduced rotator cuff/scapular/biceps strength, decreased tolerance to active movement, WNL sensation, ongoing pain with rest/activity. Pt will benefit with PT services with shoulder/elbow PROM/AAROM/AROM, progressive rotator cuff/scapular/biceps strengthening, GH mobilizations, modalities as needed for pain management, manual/STM per protocol to return to PLOF. Pt prior to onset of current condition had no pain with able to complete full ADLs and work activities. Patient received education on their current pathology and how their condition effects them with their functional activities. Patient understood discussion and questions were answered. Patient understands their activity limitations and understands rational for treatment progression.        Plan for Next Session:  --      Time In / Time Out:  8685-4314      Timed Code/Total Treatment Minutes:   39'      2 TE (25') 1 TA (20')     Next Progress Note due: 10th visit       Plan of Care Interventions:  [x] Therapeutic Exercise  [] Modalities:  [x] Therapeutic Activity     [] Ultrasound  [] Estim  [x] Gait Training      [] Cervical Traction [] Lumbar Traction  [x] Neuromuscular Re-education    [] Cold/hotpack [] Iontophoresis   [x] Instruction in HEP      [x] Vasopneumatic   [] Dry Needling    [x] Manual Therapy               [] Aquatic Therapy              Electronically signed by:Jailyn Antunez, PT, DPT, CSCS  6/22/2022,8:34 AM

## 2022-06-28 ENCOUNTER — OFFICE VISIT (OUTPATIENT)
Dept: ORTHOPEDIC SURGERY | Age: 57
End: 2022-06-28
Payer: COMMERCIAL

## 2022-06-28 VITALS
SYSTOLIC BLOOD PRESSURE: 128 MMHG | WEIGHT: 194 LBS | BODY MASS INDEX: 25.71 KG/M2 | HEIGHT: 73 IN | DIASTOLIC BLOOD PRESSURE: 72 MMHG | HEART RATE: 100 BPM

## 2022-06-28 DIAGNOSIS — Z98.890 S/P ARTHROSCOPY OF LEFT SHOULDER: Primary | ICD-10-CM

## 2022-06-28 PROCEDURE — 1036F TOBACCO NON-USER: CPT | Performed by: STUDENT IN AN ORGANIZED HEALTH CARE EDUCATION/TRAINING PROGRAM

## 2022-06-28 PROCEDURE — G8419 CALC BMI OUT NRM PARAM NOF/U: HCPCS | Performed by: STUDENT IN AN ORGANIZED HEALTH CARE EDUCATION/TRAINING PROGRAM

## 2022-06-28 PROCEDURE — 99213 OFFICE O/P EST LOW 20 MIN: CPT | Performed by: STUDENT IN AN ORGANIZED HEALTH CARE EDUCATION/TRAINING PROGRAM

## 2022-06-28 PROCEDURE — 3017F COLORECTAL CA SCREEN DOC REV: CPT | Performed by: STUDENT IN AN ORGANIZED HEALTH CARE EDUCATION/TRAINING PROGRAM

## 2022-06-28 PROCEDURE — G8427 DOCREV CUR MEDS BY ELIG CLIN: HCPCS | Performed by: STUDENT IN AN ORGANIZED HEALTH CARE EDUCATION/TRAINING PROGRAM

## 2022-06-28 NOTE — LETTER
[facility]  [address] [facility]  Phone: [phone]  Fax: [phone]    Ankti Iyer DO        June 28, 2022     Patient: Karly Torres   YOB: 1965   Date of Visit: 6/28/2022       To Whom It May Concern: It is my medical opinion that Gerhardt Forester may return to work on 07/29/2022 without restrictions. .    If you have any questions or concerns, please don't hesitate to call.     Sincerely,        Ankit Iyer DO

## 2022-06-28 NOTE — PROGRESS NOTES
Pt presents today for a 11 week post op follow up on left shoulder. Pt states he has been working on his ROM in physical therapy. Pt is still working on increasing the weight on a vertical lift with physical therapy. Pt states he is still on temporary disability with work.

## 2022-06-28 NOTE — PATIENT INSTRUCTIONS
Continue to weight bear as tolerated  Continue range of motion  Ice and elevate as needed  Tylenol or Motrin for pain   Rest for 24-48 hours  Follow up as needed

## 2022-06-28 NOTE — PROGRESS NOTES
Date of surgery:  1/31/2022     Procedure:  1. Left shoulder Arthroscopic rotator cuff debridement   2.   Left shoulder Arthroscopic distal clavicle excision    3.   Left shoulder Arthroscopic subacromial decompression and bursectomy    4.   Left shoulder arthroscopic biceps tenotomy tenodesis    5.   Left shoulder Arthroscopic labral debridement    6.  Left shoulder Arthroscopic lysis of adhesions       History:  Harman Sarabia is here in next week for 5-month follow up regarding their  Left shoulder arthroscopy. Patient is doing well. They have 0/10 pain. They deny chest pain, SOB, calf pain,fever,wound drainage. No other issues. Patient denies any constitutional symptoms. Patient states they have been compliant with restrictions continue with physical therapy which is going well. He feels that he continues to improve and is very happy with how he is doing. He states he is simply better now than it was before surgery and he feels approximately 80%. No new injuries or complaints his last being seen. Physical:   Patient demonstrates appropriate mood and affect. Left upper extremity exam:   The incisions are clean, dry, intact and nontender with no erythema. Incisions have healed well without any concerning findings They have minimal edema, the Arm compartments are soft . There are No cords or calf tenderness. No significant calf/ankle edema. They are neurovascularly intact distally. Range of motion of the left shoulder demonstrates: Active forward flexion approximately 140 degrees, external rotation approximately 50 degrees, abduction approximately 90 degrees    No tenderness to palpation over portals or proximal biceps. Imaging:   No new orthopedic imaging     Impression: Status post above, doing well        Plan:   -I offered reassurance to the patient is doing well at this time. He agrees and is very happy with his treatment and his progress thus far.   He will return in 3 months for reevaluation. Patient can return to work in 1 month as tolerated without restrictions and a note was provided for this today.   I did  him to slowly return activities but he has no restrictions at this time.  -Continue to work on range of motion and strengthening  -continue the PT protocol   -Protocol provided to physical therapy team previously  -rest/elevation as needed  -No refills for needed today  -f/u in 3 months  -f/u sooner prn any issues

## 2022-06-29 ENCOUNTER — HOSPITAL ENCOUNTER (OUTPATIENT)
Dept: PHYSICAL THERAPY | Age: 57
Setting detail: THERAPIES SERIES
Discharge: HOME | End: 2022-06-29
Payer: COMMERCIAL

## 2022-06-29 PROCEDURE — 97530 THERAPEUTIC ACTIVITIES: CPT

## 2022-06-29 PROCEDURE — 97110 THERAPEUTIC EXERCISES: CPT

## 2022-06-29 NOTE — FLOWSHEET NOTE
Outpatient Physical Therapy  [place]           [x] Phone: [phone]   Fax: [phone]  [facility]           [] Phone: [phone]   Fax: [phone]        Physical Therapy Daily Treatment Note  Date:  2022    Patient Name:  Omar Meek    :  1965  MRN: [id number]  Restrictions/Precautions: limit ER to 40 deg (4 weeks), no extension past body (4 weeks), no shoulder motion behind the back, no closed chain *See folder for Samson protocol*  Diagnosis:   Diagnosis: L shoulder surg (biceps tenodesis without RC repair)  Date of Injury/Surgery: 22  Treatment Diagnosis: Treatment Diagnosis: Decreased LUE strength and mobility, limtied by pain    Insurance/Certification information: PT Insurance Information: [employer] $20 co-pay  Referring Physician:  Referring Practitioner: Dr. Darlene Espino  Next Doctor Visit:  2022  Plan of care signed (Y/N):  YES, sent 22  Outcome Measure: Quick DASH: 52%  Visit# / total visits:     Pain level: 0/10  Goals:     Patient goals : improve ability to return to work and golf  Short term goals  Time Frame for Short term goals: 5 weeks  Short term goal 1: Pt demo I w/ HEP and pain management reports good compliance  Short term goal 2: Pt demo Quick DASH score improvement by 25% to increase ability to return to work duties MET  Short term goal 3: Pt demo WNL shoulder PROM in all directions with pain <1/10 to improve overhead reaching MET  Short term goal 4: Pt demo >4/5 shoulder and elbow strength in all directions and pain reported <1/10 to facilitate return to work Improving  Long term goals  Time Frame for Long term goals : 10 weeks  Long term goal 1: Pt demo ability to reach overhead x10 with 3 lbs to mimic reaching into an overhead cabinet Improving  Long term goal 2: Pt demo WNL shoulder AROM in all directions with pain <1/10 to improve ability to compelte his work duties Improving  Long term goal 3: Pt demo 5/5 shoulder and elbow strength in all directions and pain reported <1/10 to facilitate return to work Improving    Summary of Evaluation:  Pt is 49year old male who underwent a biceps tenodesis, rotator cuff debridement, and distal clavicle resection on 1/31/22. Pt now has difficulties reaching/lifting his arm, washing his back, shaving his head, toilet hygiene, work duties, and golfing. Pt demo deficits this date that include reduced shoulder/elbow ROM, reduced rotator cuff/scapular/biceps strength, decreased tolerance to active movement, WNL sensation, ongoing pain with rest/activity. Pt will benefit with PT services with shoulder/elbow PROM/AAROM/AROM, progressive rotator cuff/scapular/biceps strengthening, GH mobilizations, modalities as needed for pain management, manual/STM per protocol to return to PLOF. Pt prior to onset of current condition had no pain with able to complete full ADLs and work activities. Patient received education on their current pathology and how their condition effects them with their functional activities. Patient understood discussion and questions were answered. Patient understands their activity limitations and understands rational for treatment progression. Subjective: Pt stated that his pain was 0/10 today. Pt stated that he wants to finish up therapy next visit and continue to work on his own. Any changes in Ambulatory Summary Sheet?   None      Objective:    COVID screening questions were asked and patient attested that there had been no contact or symptoms    Poor form with crate lifting and needed cues  Poor form with squat press ups      Exercises: (No more than 4 columns)   Exercise/Equipment 5/23/22 #22 6/15/22 #23 6/22/22 #24 6/29/2022 #25     TM        WARM UP          Pulley's       UBE 4' 4' 5' 5'   TABLE       PROM of elbow/shoulder       *Wrist AROM       *Ball Squeezes       *Pendulums        *Scap Squeezes          [employer] Seated ER       Supine Cane Flexion       Seated table slides in flexion       Prone extension       ER isometric       SL Flex/ABD       Biceps Curl       STANDING       Roller on Wall       TRX Walk-Outs x10      Scaption Raise Front raise w/ 5# DB 2x7, no assist    Front raise w/ 5# 2x10, no assist  *try 6 lbs next    scaption raise 3# 2x10 Front raise 2x10 6#    Scaption raise 3# 2x10 Front raise 10x2 6#    Scaption raise 3# 10x2   Triceps Extension TB       Standing SB stretch into flexion       Wall saw       Wall Push-UP 2x10 counter 2x10 counter 2x10 table  10x2 table   OH Reach      10# MB 2x10, 3\" 10# MB 2x10 3\" hold away from wall 12# MB 2x10 12# MBx10  Added squat x 10   TB ER       Farmer's Carry   25# 2 laps on turf 25# 2 laps on turf   Shuttle leg press    4C 10x2   TRX Rows  2x10   2x10 10x2   Lifting Mechanics       Pull down w/ straight arms 2x10 20# t-bar DA 2x10 20# t-bar 2x10 33# t-bar 33# 10x2   Low fly  Bench supine 2x10 5# --     Mid press up and out       Sled Push/Pull       Bench press 3x10, add 10 lbs  3x10, 10 lbs added 3x10 30 lbs added (15/15) 3x10 30 lbs added (15/15)   FM ROW Bent over row 2x10 25# KB    20# L, 30# R, SA 2x10 Bent Over Row 25# KB    FM cable 33#  FM cable both arms 2x10 37# 37# 10x2  FM cable both arms   Statue of [place] 3# length of turf x2 3# length of turf x2 laps 3# length of turf x2 laps 3# length of turf x3 laps   Crate Lifts   next 10# x10   Floor to waist and floor to [place]   PROPRIOCEPTION       Plank Shoulder Taps  2x20 alt 2x10 alt 2x10 alt  10x2 alt    SB \"I\", \"Y\", \"T\" 2x10 ea      rebounder                     MODALITIES       vaso --                 Other Therapeutic Activities/Education:  Patient received education on their current pathology and how their condition effects them with their functional activities. Patient understood discussion and questions were answered. Patient understands their activity limitations and understands rational for treatment progression.        Home Exercise Program:  HO issued, reviewed and discussed with patient. Pt agreed to comply. Manual Treatments:  x 10'             Communication with other providers:  gamaliel sent 2/22/22      Assessment:   Pt tolerated treatment fairly well. Pt was able to advance activity in the gym. Pt rated pain at 0/10 after treatment. Pt plans to discharge after next visit. Pt is 49year old male who underwent a biceps tenodesis, rotator cuff debridement, and distal clavicle resection on 1/31/22. Pt now has difficulties reaching/lifting his arm, washing his back, shaving his head, toilet hygiene, work duties, and golfing. Pt demo deficits this date that include reduced shoulder/elbow ROM, reduced rotator cuff/scapular/biceps strength, decreased tolerance to active movement, WNL sensation, ongoing pain with rest/activity. Pt will benefit with PT services with shoulder/elbow PROM/AAROM/AROM, progressive rotator cuff/scapular/biceps strengthening, GH mobilizations, modalities as needed for pain management, manual/STM per protocol to return to PLOF. Pt prior to onset of current condition had no pain with able to complete full ADLs and work activities. Patient received education on their current pathology and how their condition effects them with their functional activities. Patient understood discussion and questions were answered. Patient understands their activity limitations and understands rational for treatment progression.        Plan for Next Session:  --      Time In / Time Out: 5123/3136      Timed Code/Total Treatment Minutes:   45'/ 45'  1 TE (20') 2 TA (25')     Next Progress Note due: 10th visit       Plan of Care Interventions:  [x] Therapeutic Exercise  [] Modalities:  [x] Therapeutic Activity     [] Ultrasound  [] Estim  [x] Gait Training      [] Cervical Traction [] Lumbar Traction  [x] Neuromuscular Re-education    [] Cold/hotpack [] Iontophoresis   [x] Instruction in HEP      [x] Vasopneumatic   [] Dry Needling    [x] Manual Therapy [] Aquatic Therapy              Electronically signed by:Zonia Zavaleta, PTA  6/29/2022,8:32 AM       6/29/2022,12:55 PM

## 2022-07-06 ENCOUNTER — HOSPITAL ENCOUNTER (OUTPATIENT)
Dept: PHYSICAL THERAPY | Age: 57
Setting detail: THERAPIES SERIES
Discharge: HOME | End: 2022-07-06
Payer: COMMERCIAL

## 2022-07-06 PROCEDURE — 97110 THERAPEUTIC EXERCISES: CPT

## 2022-07-06 NOTE — FLOWSHEET NOTE
Outpatient Physical Therapy  [place]           [x] Phone: [phone]   Fax: [phone]  [facility]           [] Phone: [phone]   Fax: [phone]        Physical Therapy Daily Treatment Note  Date:  2022    Patient Name:  Attila Pulido    :  1965  MRN: [id number]  Restrictions/Precautions: limit ER to 40 deg (4 weeks), no extension past body (4 weeks), no shoulder motion behind the back, no closed chain *See folder for [facility] protocol*  Diagnosis:   Diagnosis: L shoulder surg (biceps tenodesis without RC repair)  Date of Injury/Surgery: 22  Treatment Diagnosis: Treatment Diagnosis: Decreased LUE strength and mobility, limtied by pain    Insurance/Certification information: PT Insurance Information: [employer] $20 co-pay  Referring Physician:  Referring Practitioner: Dr. Fifi Hutchison  Next Doctor Visit:  2022  Plan of care signed (Y/N):  YES, sent 22  Outcome Measure: Quick DASH: 52%  Visit# / total visits:     Pain level: 0/10  Goals:     Patient goals : improve ability to return to work and golf  Short term goals  Time Frame for Short term goals: 5 weeks  Short term goal 1: Pt demo I w/ HEP and pain management reports good compliance  Short term goal 2: Pt demo Quick DASH score improvement by 25% to increase ability to return to work duties MET  Short term goal 3: Pt demo WNL shoulder PROM in all directions with pain <1/10 to improve overhead reaching MET  Short term goal 4: Pt demo >4/5 shoulder and elbow strength in all directions and pain reported <1/10 to facilitate return to work MET  Long term goals  Time Frame for Long term goals : 10 weeks  Long term goal 1: Pt demo ability to reach overhead x10 with 3 lbs to mimic reaching into an overhead cabinet MET  Long term goal 2: Pt demo WNL shoulder AROM in all directions with pain <1/10 to improve ability to compelte his work duties MET  Long term goal 3: Pt demo 5/5 shoulder and elbow strength in all directions and pain reported <1/10 to facilitate return to work MET    Summary of Evaluation:  Pt is 49year old male who underwent a biceps tenodesis, rotator cuff debridement, and distal clavicle resection on 1/31/22. Pt now has difficulties reaching/lifting his arm, washing his back, shaving his head, toilet hygiene, work duties, and golfing. Pt demo deficits this date that include reduced shoulder/elbow ROM, reduced rotator cuff/scapular/biceps strength, decreased tolerance to active movement, WNL sensation, ongoing pain with rest/activity. Pt will benefit with PT services with shoulder/elbow PROM/AAROM/AROM, progressive rotator cuff/scapular/biceps strengthening, GH mobilizations, modalities as needed for pain management, manual/STM per protocol to return to PLOF. Pt prior to onset of current condition had no pain with able to complete full ADLs and work activities. Patient received education on their current pathology and how their condition effects them with their functional activities. Patient understood discussion and questions were answered. Patient understands their activity limitations and understands rational for treatment progression. Subjective: Temitope Pedro reports no pain or soreness upon arrival to the session. Will return to work on July 29th without restrictions. Feels he is ready to return back. Any changes in Ambulatory Summary Sheet?   None      Objective:    COVID screening questions were asked and patient attested that there had been no contact or symptoms    SEE DN      Exercises: (No more than 4 columns)   Exercise/Equipment 6/22/22 #24 6/29/2022 #25 7/6/22 #26     TM       WARM UP         Pulley's      UBE 5' 5' 5'   TABLE      PROM of elbow/shoulder      *Wrist AROM      *Ball Squeezes      *Pendulums       *Scap Squeezes         [employer] Seated ER      Supine Cane Flexion      Seated table slides in flexion      Prone extension      ER isometric      SL Flex/ABD      Biceps Curl      STANDING Roller on [place]      TRX Walk-Outs      Scaption Raise Front raise 2x10 6#    Scaption raise 3# 2x10 Front raise 10x2 6#    Scaption raise 3# 10x2    Triceps Extension TB      Standing SB stretch into flexion      Wall saw      Wall Push-UP 2x10 table  10x2 table    OH Reach      12# MB 2x10 12# MBx10  Added squat x 10    TB ER      Farmer's Carry 25# 2 laps on turf 25# 2 laps on turf    Shuttle leg press  4C 10x2    TRX Rows  2x10 10x2    Lifting Mechanics      Pull down w/ straight arms 2x10 33# t-bar 33# 10x2    Low fly       Mid press up and out      Sled Push/Pull      Bench press 3x10 30 lbs added (15/15) 3x10 30 lbs added (15/15)    FM ROW FM cable both arms 2x10 37# 37# 10x2  FM cable both arms    Statue of [place] 3# length of turf x2 laps 3# length of turf x3 laps    Crate Lifts next 10# x10   Floor to waist and floor to [place]    PROPRIOCEPTION      Plank Shoulder Taps  2x10 alt  10x2 alt     SB \"I\", \"Y\", \"T\"      rebounder                  MODALITIES      vaso                Other Therapeutic Activities/Education:  Patient received education on their current pathology and how their condition effects them with their functional activities. Patient understood discussion and questions were answered. Patient understands their activity limitations and understands rational for treatment progression. Home Exercise Program:  HO issued, reviewed and discussed with patient. Pt agreed to comply. Manual Treatments:  x 10'             Communication with other providers:  eval sent 2/22/22      Assessment:  Brad España has completed 26 visits since the start of therapy on 2/22/22. He demonstrates good improvements in overhead mobility and external rotation ROM, strength, GH mobilizations, and tolerance to closed chain activities. He is improving with his lifting abilities, with the ability to scaption/front raise approx 6 lbs for 2 rounds of 10 repetitions.  At this time, patient has met all his goals and will continue his program independently. Will return to work at the end of July. Pt is 49year old male who underwent a biceps tenodesis, rotator cuff debridement, and distal clavicle resection on 1/31/22. Pt now has difficulties reaching/lifting his arm, washing his back, shaving his head, toilet hygiene, work duties, and golfing. Pt demo deficits this date that include reduced shoulder/elbow ROM, reduced rotator cuff/scapular/biceps strength, decreased tolerance to active movement, WNL sensation, ongoing pain with rest/activity. Pt will benefit with PT services with shoulder/elbow PROM/AAROM/AROM, progressive rotator cuff/scapular/biceps strengthening, GH mobilizations, modalities as needed for pain management, manual/STM per protocol to return to PLOF. Pt prior to onset of current condition had no pain with able to complete full ADLs and work activities. Patient received education on their current pathology and how their condition effects them with their functional activities. Patient understood discussion and questions were answered. Patient understands their activity limitations and understands rational for treatment progression.        Plan for Next Session:  --      Time In / Time Out: 0023-4803      Timed Code/Total Treatment Minutes:   45'   (3) TE    Next Progress Note due: 10th visit       Plan of Care Interventions:  [x] Therapeutic Exercise  [] Modalities:  [x] Therapeutic Activity     [] Ultrasound  [] Estim  [x] Gait Training      [] Cervical Traction [] Lumbar Traction  [x] Neuromuscular Re-education    [] Cold/hotpack [] Iontophoresis   [x] Instruction in HEP      [x] Vasopneumatic   [] Dry Needling    [x] Manual Therapy               [] Aquatic Therapy              Electronically signed by:Jailyn Peña, PT, DPT, CSCS  7/6/2022,11:01 AM

## 2022-07-06 NOTE — DISCHARGE SUMMARY
Outpatient Physical Therapy           [place]           [] Phone: [phone]   Fax: [phone]  [place]           [] Phone: [phone]   Fax: [phone]      To: Dr. Eva Humphrey   From: [facility]     Patient: Lina Mendez    : 1965  Diagnosis:  Diagnosis: L shoulder surg (biceps tenodesis without RC repair)     Date: 2022  Treatment Diagnosis:   Decreased LUE strength and mobility, limtied by pain      []  Progress Note                [x]  Discharge Note    Evaluation Date:   22  Total Visits to date:  26  Cancels/No-shows to date:  1    Subjective:   Senaida Boeck reports no pain or soreness upon arrival to the session. Will return to work on  without restrictions. Feels he is ready to return back. Plan of Care/Treatment to date:  [x] Therapeutic Exercise    [] Modalities:  [x] Therapeutic Activity     [] Ultrasound  [] Electrical Stimulation  [] Gait Training      [] Cervical Traction   [] Lumbar Traction  [x] Neuromuscular Re-education  [] Cold/hotpack [] Iontophoresis  [x] Instruction in HEP      Other:  [x] Manual Therapy       [x]  Vasopneumatic  [] Aquatic Therapy       []   Dry Needle Therapy                      Objective/Significant Findings At Last Visit/Comments:      Quick DASH:  52%. ..23%     L Shoulder ROM:  Flexion: 145 deg (AROM), 145 (AAROM)  Abduction: 145 deg (AROM)  ER: 42 deg (PROM)    Functional IR: T12  Functional ER: C7    Palpation: no tenderness reported    LUE Strength:  Flexion: 5/5 (away from body)  Abduction: 4+/5 (away from body)  ER: 5/5  IR: 5/5  Biceps: 5/5  Triceps: 5/5    OH Lift/Reach: able to lift 12 lbs DB overhead for x1 repetition with slight compensation in trunk  Farmer's Carry: able to carry 30 lbs for 50 ft without increased pain or compensation    Assessment:   Senaida Boeck has completed 26 visits since the start of therapy on 22.  He demonstrates good improvements in overhead mobility and external rotation ROM, strength, GH mobilizations, and tolerance to closed chain activities. He is improving with his lifting abilities, with the ability to scaption/front raise approx 6 lbs for 2 rounds of 10 repetitions. At this time, patient has met all his goals and will continue his program independently. Will return to work at the end of July.     Goal Status:  [x] Achieved [] Partially Achieved  [] Not Achieved     Changes to goals:    Patient goals : improve ability to return to work and golf  Short term goals  Time Frame for Short term goals: 5 weeks  Short term goal 1: Pt demo I w/ HEP and pain management reports good compliance  Short term goal 2: Pt demo Quick DASH score improvement by 25% to increase ability to return to work duties MET  Short term goal 3: Pt demo WNL shoulder PROM in all directions with pain <1/10 to improve overhead reaching MET  Short term goal 4: Pt demo >4/5 shoulder and elbow strength in all directions and pain reported <1/10 to facilitate return to work MET  Long term goals  Time Frame for Long term goals : 10 weeks  Long term goal 1: Pt demo ability to reach overhead x10 with 3 lbs to mimic reaching into an overhead cabinet MET  Long term goal 2: Pt demo WNL shoulder AROM in all directions with pain <1/10 to improve ability to compelte his work duties MET  Long term goal 3: Pt demo 5/5 shoulder and elbow strength in all directions and pain reported <1/10 to facilitate return to work MET    Frequency/Duration:  # Days per week: [] 1 day # Weeks: [] 1 week [] 4 weeks [] 8 weeks     [x] 2 days   [] 2 weeks [] 5 weeks [] 10 weeks     [] 3 days   [] 3 weeks [x] 6 weeks [] 12 weeks     Rehab Potential: [] Excellent [x] Good [] Fair  [] Poor     Patient Status: [] Continue per initial plan of Care     [x] Patient now discharged     [] Additional visits requested, Please re-certify for additional visits    If we are requesting more visits, we fully anticipate the patient's condition is expected to improve within the treatment timeframe we are requesting. Electronically signed by:  Odilia Winkler PT, DPT, CSCS 7/6/2022, 8:33 AM    If you have any questions or concerns, please don't hesitate to call.   Thank you for your referral.    Physician Signature:______________________ Date:______ Time: ________  By signing above, therapists plan is approved by physician

## 2022-07-16 DIAGNOSIS — E11.21 TYPE 2 DIABETES MELLITUS WITH DIABETIC NEPHROPATHY, WITHOUT LONG-TERM CURRENT USE OF INSULIN (HCC): ICD-10-CM

## 2022-07-16 DIAGNOSIS — I10 PRIMARY HYPERTENSION: ICD-10-CM

## 2022-07-18 RX ORDER — LISINOPRIL 2.5 MG/1
TABLET ORAL
Qty: 90 TABLET | Refills: 1 | OUTPATIENT
Start: 2022-07-18

## 2022-07-22 ENCOUNTER — OFFICE VISIT (OUTPATIENT)
Dept: INTERNAL MEDICINE CLINIC | Age: 57
End: 2022-07-22
Payer: COMMERCIAL

## 2022-07-22 VITALS
OXYGEN SATURATION: 97 % | SYSTOLIC BLOOD PRESSURE: 126 MMHG | HEART RATE: 83 BPM | DIASTOLIC BLOOD PRESSURE: 78 MMHG | RESPIRATION RATE: 16 BRPM | HEIGHT: 73 IN | WEIGHT: 212.38 LBS | BODY MASS INDEX: 28.15 KG/M2

## 2022-07-22 DIAGNOSIS — J45.20 MILD INTERMITTENT ASTHMA WITHOUT COMPLICATION: ICD-10-CM

## 2022-07-22 DIAGNOSIS — Z00.00 ENCOUNTER FOR WELL ADULT EXAM WITHOUT ABNORMAL FINDINGS: ICD-10-CM

## 2022-07-22 DIAGNOSIS — E11.21 TYPE 2 DIABETES MELLITUS WITH DIABETIC NEPHROPATHY, WITHOUT LONG-TERM CURRENT USE OF INSULIN (HCC): ICD-10-CM

## 2022-07-22 DIAGNOSIS — I10 PRIMARY HYPERTENSION: ICD-10-CM

## 2022-07-22 DIAGNOSIS — Z00.00 ROUTINE GENERAL MEDICAL EXAMINATION AT A HEALTH CARE FACILITY: Primary | ICD-10-CM

## 2022-07-22 PROCEDURE — 99396 PREV VISIT EST AGE 40-64: CPT | Performed by: INTERNAL MEDICINE

## 2022-07-22 RX ORDER — ROSUVASTATIN CALCIUM 5 MG/1
5 TABLET, COATED ORAL NIGHTLY
Qty: 90 TABLET | Refills: 1 | Status: SHIPPED | OUTPATIENT
Start: 2022-07-22

## 2022-07-22 RX ORDER — ALBUTEROL SULFATE 90 UG/1
2 AEROSOL, METERED RESPIRATORY (INHALATION) EVERY 6 HOURS PRN
Qty: 1 EACH | Refills: 3 | Status: CANCELLED | OUTPATIENT
Start: 2022-07-22

## 2022-07-22 RX ORDER — LISINOPRIL 2.5 MG/1
2.5 TABLET ORAL DAILY
Qty: 90 TABLET | Refills: 1 | Status: SHIPPED | OUTPATIENT
Start: 2022-07-22

## 2022-07-22 NOTE — PROGRESS NOTES
Well Adult Note  Name: Deangelo Cover Date: 2022   MRN: [id number] Sex: Male   Age: 49 y.o. Ethnicity: Non- / Non    : 1965 Race: White (non-)      Sandro Never is here for well adult exam.  History:  DM- a1c better but still high in [place] 8. 7.  he is UNSURE IF WT WAS ACTUALLY 194 LAST TIME, MAYBE ? FEELS IS DOING WELL ON JARDIANCE TOO   overdue for eye exam    Today a1c sl better at 8.6. HE WAS HESITANT ABOUT A NEW MED SUCH AS ACTOS SO WE'LL TRY INCR JARDIANCE TO 25MG THEN IF A1C STILL >8 WE'LL CONSIDER ADDING ACTOS  Lab Results   Component Value Date    LABA1C 8.7 2022    LABA1C 10.0 2021    LABA1C 9.5 2019     Lab Results   Component Value Date    LABMICR <1.20 2022    LDLCALC 43 2022    CREATININE 0.9 03/15/2022     Hypertension: Stable. Denies CP, SOB, cough, visual changes, dizziness, palpitations or HA. Lipids:  Is continuing statin therapy and low fat diet. Tolerating medications w/o myalgias or GI upset. Asthma:  Patient denies significant chest pain/tightness, dyspnea, decreased exercise tolerance, cough, or wheezing on current regimen. Review of Systems    No Known Allergies      Prior to Visit Medications    Medication Sig Taking?  Authorizing Provider   ASPIRIN 81 PO Take 1 tablet by mouth Yes Historical Provider, MD   albuterol sulfate HFA (PROVENTIL HFA) 108 (90 Base) MCG/ACT inhaler Inhale 2 puffs into the lungs every 6 hours as needed for Wheezing Yes Haresh Diaz MD   lisinopril (PRINIVIL;ZESTRIL) 2.5 MG tablet Take 1 tablet by mouth daily Yes Haresh Diaz MD   empagliflozin (JARDIANCE) 10 MG tablet Take 1 tablet by mouth daily Yes Haresh Diaz MD   rosuvastatin (CRESTOR) 5 MG tablet Take 1 tablet by mouth nightly Yes Haresh Diaz MD         Past Medical History:   Diagnosis Date    Abdominal pain, right lower quadrant     Asthma     Bee sting allergies     COVID-19 virus infection     jan 2022, defers vaccine    Decreased libido     Diabetes mellitus with nephropathy ([address]     Family history of coronary artery disease     Fatigue     Fatty liver disease, nonalcoholic     Hypertension     NEED EKG, CONSIDER CT CARDIAC SCORING    Screening for colorectal cancer     COLOGUARD NEG 8/2021- RECHECK DUE 3 YRS       Past Surgical History:   Procedure Laterality Date    ROTATOR CUFF REPAIR Right 2005    right     SEPTOPLASTY  1990's    SHOULDER ARTHROSCOPY Left 1/31/2022    LEFT SHOULDER ARTHROSCOPY ROTATOR CUFF DEBRIDMENT VERSUS REPAIR, SUBACROMIAL DECOMPRESSION, DISTAL CLAVICLE RESECTION, BICEPS TENODESIS performed by Darwin Shaw DO at [facility] OR         Family History   Problem Relation Age of Onset    High Cholesterol Mother     High Blood Pressure Mother     Diabetes Mother     Heart Disease Mother         CAD? Thyroid Disease Mother         Hypothyroid       Social History     Tobacco Use    Smoking status: Former     Packs/day: 0.50     Years: 10.00     Pack years: 5.00     Types: Cigarettes    Smokeless tobacco: Former     Types: Chew    Tobacco comments:     E Cig on regular basis   Vaping Use    Vaping Use: Never used   Substance Use Topics    Alcohol use: Yes     Alcohol/week: 0.0 standard drinks     Comment: Occasional     Drug use: Never       Objective   /78   Pulse 83   Resp 16   Ht 6' 1\" (1.854 m)   Wt 212 lb 6 oz (96.3 kg)   SpO2 97%   BMI 28.02 kg/m²   Wt Readings from Last 3 Encounters:   07/22/22 212 lb 6 oz (96.3 kg)   06/28/22 194 lb (88 kg)   04/20/22 194 lb (88 kg)          Physical Exam  Vitals reviewed. Constitutional:       General: He is not in acute distress. Appearance: He is well-developed. HENT:      Head: Normocephalic and atraumatic. Eyes:      General: No scleral icterus. Right eye: No discharge. Left eye: No discharge.       Conjunctiva/sclera: Conjunctivae normal.      Pupils: Pupils are equal, round, and reactive to light. Neck:      Thyroid: No thyromegaly. Vascular: No JVD. Trachea: No tracheal deviation. Cardiovascular:      Rate and Rhythm: Normal rate and regular rhythm. Heart sounds: Normal heart sounds. No murmur heard. No friction rub. No gallop. Pulmonary:      Effort: Pulmonary effort is normal. No respiratory distress. Breath sounds: Normal breath sounds. No wheezing or rales. Abdominal:      General: Bowel sounds are normal. There is no distension. Palpations: Abdomen is soft. There is no mass. Tenderness: There is no abdominal tenderness. There is no guarding or rebound. Musculoskeletal:      Cervical back: Normal range of motion and neck supple. Lymphadenopathy:      Cervical: No cervical adenopathy. Skin:     General: Skin is warm and dry. Findings: No rash. Neurological:      Mental Status: He is alert. Psychiatric:         Mood and Affect: Mood normal.         Assessment   Plan   1. Routine general medical examination at a health care facility - Overall general medical exam appears benign, other assessments as noted and testing is as noted below. 2. Type 2 diabetes mellitus with diabetic nephropathy, without long-term current use of insulin ([address]- as above:   -     rosuvastatin (CRESTOR) 5 MG tablet; Take HE WAS HESITANT ABOUT A NEW MED SUCH AS ACTOS SO WE'LL TRY INCR JARDIANCE TO 25MG THEN IF A1C STILL >8 WE'LL CONSIDER ADDING ACTOS1 tablet by mouth nightly, Disp-90 tablet, R-1Normal  -     lisinopril (PRINIVIL;ZESTRIL) 2.5 MG tablet; Take 1 tablet by mouth in the morning., Disp-90 tablet, R-1Normal  -     empagliflozin (JARDIANCE) 25 MG tablet; Take 1 tablet by mouth in the morning., Disp-90 tablet, R-1Normal  -     Comprehensive Metabolic Panel; Future  -     CBC with Auto Differential; Future  -     Lipid Panel; Future  -     Hemoglobin A1C; Future  3. Primary hypertension - Blood pressure stable and will continue current regimen. Will plan periodic monitoring of renal function, electrolytes, lipid profile. -     lisinopril (PRINIVIL;ZESTRIL) 2.5 MG tablet; Take 1 tablet by mouth in the morning., Disp-90 tablet, R-1Normal  -     Comprehensive Metabolic Panel; Future  -     CBC with Auto Differential; Future  -     Lipid Panel; Future  4. Mild intermittent asthma without complication  - Asthma clinically stable w/o ongoing symptoms, will continue inhaler/medical regimen. 5. Encounter for well adult exam without abnormal findings         Personalized Preventive Plan   Current Health Maintenance Status  Immunization History   Administered Date(s) Administered    Influenza Virus Vaccine 09/28/2015    Influenza, Intradermal, Preservative free 12/10/2015    Influenza, Quadv, IM, (6 mo and older Fluzone, Flulaval, Fluarix and 3 yrs and older Afluria) 09/14/2016, 11/01/2017    Pneumococcal Polysaccharide ([id number]) 09/14/2016        Health Maintenance   Topic Date Due    COVID-19 Vaccine (1) Never done    HIV screen  Never done    Hepatitis B vaccine (1 of 3 - Risk 3-dose series) Never done    DTaP/Tdap/Td vaccine (1 - Tdap) Never done    Diabetic retinal exam  06/15/2017    Pneumococcal 0-64 years Vaccine (2 - PCV) 09/14/2017    Shingles vaccine (1 of 2) Never done    Depression Screen  07/27/2022    Flu vaccine (1) 09/01/2022    Diabetic foot exam  01/21/2023    A1C test (Diabetic or Prediabetic)  01/21/2023    Lipids  01/21/2023    Colorectal Cancer Screen  08/10/2024    Hepatitis C screen  Completed    Hepatitis A vaccine  Aged Out    Hib vaccine  Aged Out    Meningococcal (ACWY) vaccine  Aged Out     Recommendations for [employer] Due: see orders and patient instructions/AVS.    Return in about 6 months (around 1/22/2023) for routine DM.

## 2022-09-28 ENCOUNTER — OFFICE VISIT (OUTPATIENT)
Dept: ORTHOPEDIC SURGERY | Age: 57
End: 2022-09-28
Payer: COMMERCIAL

## 2022-09-28 VITALS
HEART RATE: 85 BPM | SYSTOLIC BLOOD PRESSURE: 136 MMHG | BODY MASS INDEX: 28.02 KG/M2 | DIASTOLIC BLOOD PRESSURE: 86 MMHG | OXYGEN SATURATION: 97 % | HEIGHT: 73 IN

## 2022-09-28 DIAGNOSIS — Z98.890 S/P ARTHROSCOPY OF LEFT SHOULDER: Primary | ICD-10-CM

## 2022-09-28 PROCEDURE — G8427 DOCREV CUR MEDS BY ELIG CLIN: HCPCS | Performed by: STUDENT IN AN ORGANIZED HEALTH CARE EDUCATION/TRAINING PROGRAM

## 2022-09-28 PROCEDURE — 1036F TOBACCO NON-USER: CPT | Performed by: STUDENT IN AN ORGANIZED HEALTH CARE EDUCATION/TRAINING PROGRAM

## 2022-09-28 PROCEDURE — 3017F COLORECTAL CA SCREEN DOC REV: CPT | Performed by: STUDENT IN AN ORGANIZED HEALTH CARE EDUCATION/TRAINING PROGRAM

## 2022-09-28 PROCEDURE — G8419 CALC BMI OUT NRM PARAM NOF/U: HCPCS | Performed by: STUDENT IN AN ORGANIZED HEALTH CARE EDUCATION/TRAINING PROGRAM

## 2022-09-28 PROCEDURE — 99213 OFFICE O/P EST LOW 20 MIN: CPT | Performed by: STUDENT IN AN ORGANIZED HEALTH CARE EDUCATION/TRAINING PROGRAM

## 2022-09-28 NOTE — PROGRESS NOTES
9/28/2022   Chief Complaint   Patient presents with    Post-Op Check     Patient is here for 9 month post op check on Left Shoulder DOS 1/31/22        History of Present Illness:                             Sandra Sanderson is a 49 y.o. male     Date of surgery:  1/31/2022     Procedure:  Left shoulder Arthroscopic rotator cuff debridement   2. Left shoulder Arthroscopic distal clavicle excision    3. Left shoulder Arthroscopic subacromial decompression and bursectomy    4. Left shoulder arthroscopic biceps tenodesis    5. Left shoulder Arthroscopic labral debridement    6. Left shoulder Arthroscopic lysis of adhesions       History:  Ellie De La Garza is here in next week for 8-month follow up regarding their  Left shoulder arthroscopy. Patient is doing well. They have 0/10 pain. They deny chest pain, SOB, calf pain,fever,wound drainage. No other issues. Patient denies any constitutional symptoms. Patient states they have been compliant with restrictions and he continues to do the exercises as taught in physical therapy without issue. He feels that he continues to improve and is very happy with how he is doing. He states he is significantly better now than at his last visit and before surgery and he feels 100%. No new injuries or complaints his last being seen. Medical History  Patient's medications, allergies, past medical, surgical, social and family histories were reviewed and updated as appropriate.     Past Medical History:   Diagnosis Date    Abdominal pain, right lower quadrant     Asthma     Bee sting allergies     COVID-19 virus infection     jan 2022, defers vaccine    Decreased libido     Diabetes mellitus with nephropathy (HCC)     bottoms out/hypoglycemia on glipizide, metformin causes severe diarrhea    Family history of coronary artery disease     Fatigue     Fatty liver disease, nonalcoholic     Hypertension     NEED EKG, CONSIDER CT CARDIAC SCORING    Screening for colorectal cancer     COLOGUARD NEG 8/2021- RECHECK DUE 3 YRS     Past Surgical History:   Procedure Laterality Date    ROTATOR CUFF REPAIR Right 2005    right     SEPTOPLASTY  1990's    SHOULDER ARTHROSCOPY Left 1/31/2022    LEFT SHOULDER ARTHROSCOPY ROTATOR CUFF DEBRIDMENT VERSUS REPAIR, SUBACROMIAL DECOMPRESSION, DISTAL CLAVICLE RESECTION, BICEPS TENODESIS performed by Franco Arteaga DO at [facility] OR     Family History   Problem Relation Age of Onset    High Cholesterol Mother     High Blood Pressure Mother     Diabetes Mother     Heart Disease Mother         CAD? Thyroid Disease Mother         Hypothyroid     Social History     Socioeconomic History    Marital status:    Occupational History    Occupation: Truck Movers     Comment:     Occupation: [employer]    Occupation: [employer]     Comment: since 4/2019   Tobacco Use    Smoking status: Former     Packs/day: 0.50     Years: 10.00     Pack years: 5.00     Types: Cigarettes    Smokeless tobacco: Former     Types: Chew    Tobacco comments:     E Cig on regular basis   Vaping Use    Vaping Use: Never used   Substance and Sexual Activity    Alcohol use: Yes     Alcohol/week: 0.0 standard drinks     Comment: Occasional     Drug use: Never    Sexual activity: Yes     Partners: Female   Social History Narrative    Diet: unrestricted    Exercise: A lot    Seat Belt: Mostly     Current Outpatient Medications   Medication Sig Dispense Refill    rosuvastatin (CRESTOR) 5 MG tablet Take 1 tablet by mouth nightly 90 tablet 1    lisinopril (PRINIVIL;ZESTRIL) 2.5 MG tablet Take 1 tablet by mouth in the morning. 90 tablet 1    empagliflozin (JARDIANCE) 25 MG tablet Take 1 tablet by mouth in the morning.  90 tablet 1    ASPIRIN 81 PO Take 1 tablet by mouth      albuterol sulfate HFA (PROVENTIL HFA) 108 (90 Base) MCG/ACT inhaler Inhale 2 puffs into the lungs every 6 hours as needed for Wheezing 1 each 3     No current facility-administered medications for this visit. Allergies   Allergen Reactions    Metformin And Related Diarrhea     Severe diarrhea         Review of Systems   Constitutional:  Negative for activity change, fatigue and unexpected weight change. HENT:  Negative for hearing loss, sneezing and sore throat. Respiratory:  Negative for cough, shortness of breath and wheezing. Cardiovascular:  Negative for chest pain and leg swelling. Gastrointestinal:  Negative for diarrhea, nausea and vomiting. Musculoskeletal:  Negative for arthralgias, back pain, gait problem, joint swelling, myalgias, neck pain and neck stiffness. Skin:  Negative for color change, pallor, rash and wound. Neurological:  Negative for speech difficulty, weakness and numbness. Psychiatric/Behavioral:  Negative for behavioral problems and confusion. The patient is not hyperactive. Examination:  General Exam:  Vitals: /86 (Site: Right Wrist, Position: Sitting)   Pulse 85   Ht 6' 1\" (1.854 m)   SpO2 97%   BMI 28.02 kg/m²    Physical Exam  Constitutional:       General: He is not in acute distress. Appearance: Normal appearance. He is normal weight. He is not ill-appearing. HENT:      Head: Normocephalic and atraumatic. Nose: Nose normal.   Eyes:      General:         Right eye: No discharge. Left eye: No discharge. Extraocular Movements: Extraocular movements intact. Cardiovascular:      Pulses: Normal pulses. Pulmonary:      Effort: Pulmonary effort is normal.      Breath sounds: Normal breath sounds. Musculoskeletal:         General: No swelling, tenderness, deformity or signs of injury. Right shoulder: Normal.      Left shoulder: No swelling, deformity, effusion, laceration, tenderness, bony tenderness or crepitus. Normal range of motion. Normal strength. Normal pulse.       Right upper arm: Normal.      Left upper arm: Normal.      Right elbow: Normal.      Left elbow: Normal. Right forearm: Normal.      Left forearm: Normal.      Right wrist: Normal.      Left wrist: Normal.      Right hand: Normal.      Left hand: Normal.      Cervical back: Normal and normal range of motion. No rigidity or tenderness. Skin:     General: Skin is warm and dry. Neurological:      General: No focal deficit present. Mental Status: He is alert and oriented to person, place, and time. LEFT SHOULDER / UPPER EXTREMITY EXAM      OBSERVATION:      Swelling: none   Deformity: none    Discoloration: none   Scapular winging: none    Scars: Previous arthroscopy portals, well-healed       atrophy: none      TENDERNESS / CREPITUS (T/C):      Clavicle -/-    SUPRAspinatus  -/-     AC Jt. -/-    INFRAspinatus -/-     SC Jt. -/-    Deltoid -/-     G. Tuberosity -/-   LH BICEP groove -/-    Acromion: -/-    Midline Neck -/-     Scapular Spine -/-   Trapezium -/-    Inf Border Scapula -/-   GH jt. line - post -/-     Scapulothoracic -/-   GH jt. line - ant -/-       ROM: (* = with pain)  Left shoulder   Right shoulder     AROM (PROM)  AROM (PROM)    FE   170° (175°)    170° (175°)     ER 0°   60° (65°)   60° (65°)     ER 90° ABD  90° (90°)   90°  (90°)    IR 90° ABD  40 (40°)   40  (40°)     IR (spine) T7   T10        STRENGTH: (* = with pain) Left shoulder   Right shoulder    SCAPTION   5/5    5/5     IR    5/5    5/5     ER    5/5    5/5     BICEPS   5/5    5/5     Deltoid   5/5    5/5     Triceps  5/5   5/5        SIGNS:  RUE     ODANIELLES: neg      SPEEDS: neg   Yergason's: neg    Biceps stretch test: neg        EXTREMITY NEURO-VASCULAR EXAM:     Sensation grossly intact to light touch all dermatomal regions. DTR 2+ Biceps, Triceps, BR and Negative Sarahs sign    Grossly intact motor function at Elbow, Wrist and Hand    Distal pulses radial and ulnar 2+, brisk cap refill, symmetric. Physical:   Patient demonstrates appropriate mood and affect.      The incisions are well healed and are clean, dry, intact and nontender with no erythema. Incisions have healed well without any concerning findings They have minimal edema, the Arm compartments are soft . There are No cords or calf tenderness. No significant calf/ankle edema. They are neurovascularly intact distally. No tenderness to palpation over portals or proximal biceps. Diagnostic testing:  X-ray images were reviewed by myself and discussed with the patient:  New orthopedic imaging      Office Procedures:  No orders of the defined types were placed in this encounter. Assessment and Plan    A: Postop left shoulder scope 8 months -doing excellent    P:   -I again offered reassurance to the patient that he is doing excellent at this time and he is very much in agreement.   He is very happy with how he is doing and feels again that he is 100% and has no complaints at this time.  -Continue to work on range of motion and strengthening to prevent any future issues  -rest/elevation as needed  -No refills for needed today  -f/u i as needed    Electronically signed by Lashonda Huynh DO on 9/28/2022 at 3:19 PM

## 2022-09-28 NOTE — PROGRESS NOTES
Patient is here for 9 month follow up on L Shoulder Arthroscopy. DOS 1/31/22. Procedure note     Procedure:  Left shoulder Arthroscopic rotator cuff debridement   2. Left shoulder Arthroscopic distal clavicle excision    3. Left shoulder Arthroscopic subacromial decompression and bursectomy    4. Left shoulder arthroscopic biceps tenotomy tenodesis    5. Left shoulder Arthroscopic labral debridement    6. Left shoulder Arthroscopic lysis of adhesions     Patient is reporting some tightness but no pain, numbness or tingling. No new injuries to shoulder to report.

## 2022-09-30 ASSESSMENT — ENCOUNTER SYMPTOMS
SHORTNESS OF BREATH: 0
BACK PAIN: 0
VOMITING: 0
WHEEZING: 0
DIARRHEA: 0
COLOR CHANGE: 0
NAUSEA: 0
COUGH: 0
SORE THROAT: 0

## 2023-02-06 DIAGNOSIS — E11.21 TYPE 2 DIABETES MELLITUS WITH DIABETIC NEPHROPATHY, WITHOUT LONG-TERM CURRENT USE OF INSULIN (HCC): ICD-10-CM

## 2023-02-06 RX ORDER — EMPAGLIFLOZIN 25 MG/1
TABLET, FILM COATED ORAL
Qty: 30 TABLET | OUTPATIENT
Start: 2023-02-06

## 2023-02-06 RX ORDER — ROSUVASTATIN CALCIUM 5 MG/1
TABLET, COATED ORAL
Qty: 30 TABLET | OUTPATIENT
Start: 2023-02-06

## 2023-02-20 DIAGNOSIS — E11.21 TYPE 2 DIABETES MELLITUS WITH DIABETIC NEPHROPATHY, WITHOUT LONG-TERM CURRENT USE OF INSULIN (HCC): ICD-10-CM

## 2023-02-20 DIAGNOSIS — I10 PRIMARY HYPERTENSION: ICD-10-CM

## 2023-02-20 RX ORDER — ROSUVASTATIN CALCIUM 5 MG/1
5 TABLET, COATED ORAL NIGHTLY
Qty: 30 TABLET | Refills: 0 | Status: SHIPPED | OUTPATIENT
Start: 2023-02-20 | End: 2023-02-22 | Stop reason: SDUPTHER

## 2023-02-20 RX ORDER — LISINOPRIL 2.5 MG/1
2.5 TABLET ORAL DAILY
Qty: 90 TABLET | Refills: 1 | OUTPATIENT
Start: 2023-02-20

## 2023-02-20 RX ORDER — ROSUVASTATIN CALCIUM 5 MG/1
5 TABLET, COATED ORAL NIGHTLY
Qty: 90 TABLET | Refills: 1 | OUTPATIENT
Start: 2023-02-20

## 2023-02-22 ENCOUNTER — OFFICE VISIT (OUTPATIENT)
Dept: INTERNAL MEDICINE CLINIC | Age: 58
End: 2023-02-22

## 2023-02-22 VITALS
HEART RATE: 86 BPM | WEIGHT: 214 LBS | BODY MASS INDEX: 28.23 KG/M2 | SYSTOLIC BLOOD PRESSURE: 124 MMHG | RESPIRATION RATE: 17 BRPM | DIASTOLIC BLOOD PRESSURE: 84 MMHG | OXYGEN SATURATION: 98 %

## 2023-02-22 DIAGNOSIS — E78.2 HYPERLIPEMIA, MIXED: ICD-10-CM

## 2023-02-22 DIAGNOSIS — I10 PRIMARY HYPERTENSION: ICD-10-CM

## 2023-02-22 DIAGNOSIS — K76.0 NAFLD (NONALCOHOLIC FATTY LIVER DISEASE): ICD-10-CM

## 2023-02-22 DIAGNOSIS — R91.8 LUNG NODULE, MULTIPLE: ICD-10-CM

## 2023-02-22 DIAGNOSIS — Z12.5 SCREENING FOR MALIGNANT NEOPLASM OF PROSTATE: ICD-10-CM

## 2023-02-22 DIAGNOSIS — E11.21 TYPE 2 DIABETES MELLITUS WITH DIABETIC NEPHROPATHY, WITHOUT LONG-TERM CURRENT USE OF INSULIN (HCC): Primary | ICD-10-CM

## 2023-02-22 DIAGNOSIS — J45.20 MILD INTERMITTENT ASTHMA WITHOUT COMPLICATION: ICD-10-CM

## 2023-02-22 LAB — HBA1C MFR BLD: 9.6 %

## 2023-02-22 RX ORDER — REPAGLINIDE 1 MG/1
1 TABLET ORAL
Qty: 270 TABLET | Refills: 1 | Status: SHIPPED | OUTPATIENT
Start: 2023-02-22 | End: 2023-05-23

## 2023-02-22 RX ORDER — ROSUVASTATIN CALCIUM 5 MG/1
5 TABLET, COATED ORAL NIGHTLY
Qty: 90 TABLET | Refills: 1 | Status: SHIPPED | OUTPATIENT
Start: 2023-02-22

## 2023-02-22 ASSESSMENT — PATIENT HEALTH QUESTIONNAIRE - PHQ9
DEPRESSION UNABLE TO ASSESS: FUNCTIONAL CAPACITY MOTIVATION LIMITS ACCURACY
SUM OF ALL RESPONSES TO PHQ9 QUESTIONS 1 & 2: 0
SUM OF ALL RESPONSES TO PHQ QUESTIONS 1-9: 0
2. FEELING DOWN, DEPRESSED OR HOPELESS: 0
SUM OF ALL RESPONSES TO PHQ QUESTIONS 1-9: 0
SUM OF ALL RESPONSES TO PHQ QUESTIONS 1-9: 0
1. LITTLE INTEREST OR PLEASURE IN DOING THINGS: 0
SUM OF ALL RESPONSES TO PHQ QUESTIONS 1-9: 0

## 2023-02-22 NOTE — PROGRESS NOTES
Dom Ford  1965 02/22/23    SUBJECTIVE:    DM-   last a1c sl high, is edvin jardiance and taking regularly. Lab Results   Component Value Date    LABA1C 8.7 01/21/2022    LABA1C 10.0 07/27/2021    LABA1C 9.5 08/28/2019     Lab Results   Component Value Date    LABMICR <1.20 01/21/2022    LDLCALC 43 01/21/2022    CREATININE 0.9 03/15/2022     Nafld, lfts have been nl but this was again noted on CT chest last yr in March. We discussed trying to stabilize this w wt loss, his BMi currently ~28. Had pul nodules noted too on CTA last yr March    Has had prior hx of smoking. Hypertension: Stable. Denies CP, SOB, cough, visual changes, dizziness, palpitations or HA. Psa now due also. OBJECTIVE:    /84   Pulse 86   Resp 17   Wt 214 lb (97.1 kg)   SpO2 98%   BMI 28.23 kg/m²     Physical Exam  Constitutional:       Appearance: Normal appearance. Cardiovascular:      Rate and Rhythm: Normal rate and regular rhythm. Heart sounds: No murmur heard. No gallop. Pulmonary:      Effort: No respiratory distress. Breath sounds: No wheezing. Abdominal:      General: Abdomen is flat. Bowel sounds are normal. There is no distension. Palpations: Abdomen is soft. There is no mass. Tenderness: There is no abdominal tenderness. Hernia: No hernia is present. Musculoskeletal:      Right lower leg: No edema. Left lower leg: No edema. Neurological:      Mental Status: He is alert. Psychiatric:         Mood and Affect: Mood normal.       ASSESSMENT:    1. Type 2 diabetes mellitus with diabetic nephropathy, without long-term current use of insulin ([address]    2. Primary hypertension    3. NAFLD (nonalcoholic fatty liver disease)    4. Mild intermittent asthma without complication    5. Lung nodule, multiple    6. Hyperlipemia, mixed    7. Screening for malignant neoplasm of prostate        PLAN:    Clarissa Ordaz was seen today for diabetes.     Diagnoses and all orders for this visit:    Type 2 diabetes mellitus with diabetic nephropathy, without long-term current use of insulin (HCC)- HAD HYPOGLYCEMIA ON GLIPIZIDE, BUT DELILAH JARDIANCE.  WE'LL ADD TRIAL OF PRANDIN ALSO WATCHING FOR HYPOGLYCEMIA, THEN F/U LAB 4-6 WEEKS.    -     rosuvastatin (CRESTOR) 5 MG tablet; Take 1 tablet by mouth nightly  -     empagliflozin (JARDIANCE) 25 MG tablet; Take 1 tablet by mouth daily  -     POCT glycosylated hemoglobin (Hb A1C)  -     Comprehensive Metabolic Panel; Future  -     Lipid Panel; Future  -     Hemoglobin A1C; Future  -     CBC; Future  -     Microalbumin / Creatinine Urine Ratio; Future  -     repaglinide (PRANDIN) 1 MG tablet; Take 1 tablet by mouth 3 times daily (before meals)    Primary hypertension - Blood pressure stable and will continue current regimen.  Will plan periodic monitoring of renal function, electrolytes, lipid profile.     -     CBC; Future    NAFLD (nonalcoholic fatty liver disease)- RECHECK W U/S, MONITOR LFTS, ADVISED IS VERY IMPORTANT TO KEEP WORKING ON WT LOSS, DIET, EXERCISE AND STRICTER DM CONTROL TO HELP W FATTY LIVER.  DRINKS ALCOHOL TOO BUT ONLY A DRINK OR TWO WEEKLY AND NOT DAILY  -     US ABDOMINAL LIMITED; Future    Mild intermittent asthma without complication - COPD stable and will monitor for any exacerbations, not currently in need of any rescue inhaler therapy.      Lung nodule, multiple- RECHECK CHEST CT  -     CT CHEST WO CONTRAST; Future    Hyperlipemia, mixed - Pt will continue to work on a low fat diet and also exercise, wt loss as appropriate.  Will continue periodic monitoring of fasting lipid profile, glucose, liver function.    -     Comprehensive Metabolic Panel; Future  -     Lipid Panel; Future  -     TSH; Future    Screening for malignant neoplasm of prostate  -     PSA, Prostatic Specific Antigen; Future

## 2023-02-28 ENCOUNTER — HOSPITAL ENCOUNTER (OUTPATIENT)
Dept: CT IMAGING | Age: 58
Discharge: HOME | End: 2023-02-28
Payer: COMMERCIAL

## 2023-02-28 ENCOUNTER — HOSPITAL ENCOUNTER (OUTPATIENT)
Dept: ULTRASOUND IMAGING | Age: 58
Discharge: HOME | End: 2023-02-28
Payer: COMMERCIAL

## 2023-02-28 DIAGNOSIS — K76.0 NAFLD (NONALCOHOLIC FATTY LIVER DISEASE): ICD-10-CM

## 2023-02-28 DIAGNOSIS — R91.8 LUNG NODULE, MULTIPLE: ICD-10-CM

## 2023-02-28 PROCEDURE — 76705 ECHO EXAM OF ABDOMEN: CPT

## 2023-02-28 PROCEDURE — 71250 CT THORAX DX C-: CPT

## 2023-03-01 NOTE — RESULT ENCOUNTER NOTE
Please call pt, liver u/s indicates fatty liver, no definite cirrhosis but to help preserve fxn and avoid progression to liver failure would highly rec working on regular exercise, low fat diet AIMING TO GET HIS BASELINE WT  OR LESS INSTEAD  AT LAST APPT

## 2023-03-03 NOTE — RESULT ENCOUNTER NOTE
Please call pt, his CT indicates stable lung nodules and radiology rec for recheck these in one yr repeat CT.

## 2023-03-28 DIAGNOSIS — N48.89 PENILE PAIN: Primary | ICD-10-CM

## 2023-05-18 DIAGNOSIS — E11.21 TYPE 2 DIABETES MELLITUS WITH DIABETIC NEPHROPATHY, WITHOUT LONG-TERM CURRENT USE OF INSULIN (HCC): ICD-10-CM

## 2023-05-18 DIAGNOSIS — I10 PRIMARY HYPERTENSION: ICD-10-CM

## 2023-05-19 RX ORDER — LISINOPRIL 2.5 MG/1
2.5 TABLET ORAL DAILY
Qty: 90 TABLET | Refills: 1 | Status: SHIPPED | OUTPATIENT
Start: 2023-05-19

## 2023-05-19 RX ORDER — ROSUVASTATIN CALCIUM 5 MG/1
5 TABLET, COATED ORAL NIGHTLY
Qty: 90 TABLET | Refills: 1 | OUTPATIENT
Start: 2023-05-19

## 2023-08-16 DIAGNOSIS — I10 PRIMARY HYPERTENSION: ICD-10-CM

## 2023-08-16 DIAGNOSIS — E11.21 TYPE 2 DIABETES MELLITUS WITH DIABETIC NEPHROPATHY, WITHOUT LONG-TERM CURRENT USE OF INSULIN (HCC): ICD-10-CM

## 2023-08-16 RX ORDER — LISINOPRIL 2.5 MG/1
2.5 TABLET ORAL DAILY
Qty: 90 TABLET | Refills: 1 | OUTPATIENT
Start: 2023-08-16

## 2023-08-20 DIAGNOSIS — E11.21 TYPE 2 DIABETES MELLITUS WITH DIABETIC NEPHROPATHY, WITHOUT LONG-TERM CURRENT USE OF INSULIN (HCC): ICD-10-CM

## 2023-08-20 DIAGNOSIS — I10 PRIMARY HYPERTENSION: ICD-10-CM

## 2023-08-21 RX ORDER — LISINOPRIL 2.5 MG/1
2.5 TABLET ORAL DAILY
Qty: 90 TABLET | Refills: 1 | OUTPATIENT
Start: 2023-08-21

## 2023-08-21 RX ORDER — ROSUVASTATIN CALCIUM 5 MG/1
5 TABLET, COATED ORAL NIGHTLY
Qty: 90 TABLET | Refills: 1 | OUTPATIENT
Start: 2023-08-21

## 2023-08-22 ENCOUNTER — OFFICE VISIT (OUTPATIENT)
Dept: INTERNAL MEDICINE CLINIC | Age: 58
End: 2023-08-22
Payer: COMMERCIAL

## 2023-08-22 VITALS
HEART RATE: 83 BPM | DIASTOLIC BLOOD PRESSURE: 83 MMHG | SYSTOLIC BLOOD PRESSURE: 124 MMHG | RESPIRATION RATE: 16 BRPM | BODY MASS INDEX: 26.91 KG/M2 | OXYGEN SATURATION: 98 % | WEIGHT: 204 LBS

## 2023-08-22 DIAGNOSIS — L30.9 ECZEMA, UNSPECIFIED TYPE: ICD-10-CM

## 2023-08-22 DIAGNOSIS — K76.0 NAFLD (NONALCOHOLIC FATTY LIVER DISEASE): ICD-10-CM

## 2023-08-22 DIAGNOSIS — Z12.5 SCREENING FOR MALIGNANT NEOPLASM OF PROSTATE: ICD-10-CM

## 2023-08-22 DIAGNOSIS — E78.2 HYPERLIPEMIA, MIXED: ICD-10-CM

## 2023-08-22 DIAGNOSIS — E11.21 TYPE 2 DIABETES MELLITUS WITH DIABETIC NEPHROPATHY, WITHOUT LONG-TERM CURRENT USE OF INSULIN (HCC): ICD-10-CM

## 2023-08-22 DIAGNOSIS — Z00.00 ROUTINE GENERAL MEDICAL EXAMINATION AT A HEALTH CARE FACILITY: Primary | ICD-10-CM

## 2023-08-22 DIAGNOSIS — I10 PRIMARY HYPERTENSION: ICD-10-CM

## 2023-08-22 DIAGNOSIS — Z00.00 ENCOUNTER FOR WELL ADULT EXAM WITHOUT ABNORMAL FINDINGS: ICD-10-CM

## 2023-08-22 LAB
ALBUMIN SERPL-MCNC: 5 G/DL (ref 3.4–5)
ALBUMIN/GLOB SERPL: 2 {RATIO} (ref 1.1–2.2)
ALP SERPL-CCNC: 128 U/L (ref 40–129)
ALT SERPL-CCNC: 30 U/L (ref 10–40)
ANION GAP SERPL CALCULATED.3IONS-SCNC: 13 MMOL/L (ref 3–16)
AST SERPL-CCNC: 19 U/L (ref 15–37)
BILIRUB SERPL-MCNC: 0.4 MG/DL (ref 0–1)
BUN SERPL-MCNC: 14 MG/DL (ref 7–20)
CALCIUM SERPL-MCNC: 9.8 MG/DL (ref 8.3–10.6)
CHLORIDE SERPL-SCNC: 103 MMOL/L (ref 99–110)
CHOLEST SERPL-MCNC: 112 MG/DL (ref 0–199)
CO2 SERPL-SCNC: 26 MMOL/L (ref 21–32)
CREAT SERPL-MCNC: 1 MG/DL (ref 0.9–1.3)
CREAT UR-MCNC: 62.2 MG/DL (ref 39–259)
DEPRECATED RDW RBC AUTO: 12.8 % (ref 12.4–15.4)
GFR SERPLBLD CREATININE-BSD FMLA CKD-EPI: >60 ML/MIN/{1.73_M2}
GLUCOSE SERPL-MCNC: 228 MG/DL (ref 70–99)
HCT VFR BLD AUTO: 50.1 % (ref 40.5–52.5)
HDLC SERPL-MCNC: 51 MG/DL (ref 40–60)
HGB BLD-MCNC: 17.2 G/DL (ref 13.5–17.5)
LDLC SERPL CALC-MCNC: 46 MG/DL
MCH RBC QN AUTO: 31.3 PG (ref 26–34)
MCHC RBC AUTO-ENTMCNC: 34.3 G/DL (ref 31–36)
MCV RBC AUTO: 91.4 FL (ref 80–100)
MICROALBUMIN UR DL<=1MG/L-MCNC: 2 MG/DL
MICROALBUMIN/CREAT UR: 32.2 MG/G (ref 0–30)
PLATELET # BLD AUTO: 204 K/UL (ref 135–450)
PMV BLD AUTO: 8.2 FL (ref 5–10.5)
POTASSIUM SERPL-SCNC: 4.1 MMOL/L (ref 3.5–5.1)
PROT SERPL-MCNC: 7.5 G/DL (ref 6.4–8.2)
PSA SERPL DL<=0.01 NG/ML-MCNC: 1.25 NG/ML (ref 0–4)
RBC # BLD AUTO: 5.48 M/UL (ref 4.2–5.9)
SODIUM SERPL-SCNC: 142 MMOL/L (ref 136–145)
TRIGL SERPL-MCNC: 76 MG/DL (ref 0–150)
TSH SERPL DL<=0.005 MIU/L-ACNC: 3.39 UIU/ML (ref 0.27–4.2)
VLDLC SERPL CALC-MCNC: 15 MG/DL
WBC # BLD AUTO: 6.8 K/UL (ref 4–11)

## 2023-08-22 PROCEDURE — 3074F SYST BP LT 130 MM HG: CPT | Performed by: INTERNAL MEDICINE

## 2023-08-22 PROCEDURE — 99396 PREV VISIT EST AGE 40-64: CPT | Performed by: INTERNAL MEDICINE

## 2023-08-22 PROCEDURE — 3079F DIAST BP 80-89 MM HG: CPT | Performed by: INTERNAL MEDICINE

## 2023-08-22 PROCEDURE — 36415 COLL VENOUS BLD VENIPUNCTURE: CPT | Performed by: INTERNAL MEDICINE

## 2023-08-22 RX ORDER — REPAGLINIDE 1 MG/1
1 TABLET ORAL
Qty: 270 TABLET | Refills: 1 | Status: CANCELLED | OUTPATIENT
Start: 2023-08-22 | End: 2024-02-18

## 2023-08-22 RX ORDER — LISINOPRIL 2.5 MG/1
2.5 TABLET ORAL DAILY
Qty: 90 TABLET | Refills: 1 | Status: SHIPPED | OUTPATIENT
Start: 2023-08-22

## 2023-08-22 RX ORDER — ROSUVASTATIN CALCIUM 5 MG/1
5 TABLET, COATED ORAL NIGHTLY
Qty: 90 TABLET | Refills: 1 | Status: SHIPPED | OUTPATIENT
Start: 2023-08-22

## 2023-08-22 RX ORDER — SEMAGLUTIDE 1.34 MG/ML
0.5 INJECTION, SOLUTION SUBCUTANEOUS WEEKLY
Qty: 1 ADJUSTABLE DOSE PRE-FILLED PEN SYRINGE | Refills: 3 | Status: SHIPPED | OUTPATIENT
Start: 2023-08-22

## 2023-08-22 SDOH — ECONOMIC STABILITY: INCOME INSECURITY: HOW HARD IS IT FOR YOU TO PAY FOR THE VERY BASICS LIKE FOOD, HOUSING, MEDICAL CARE, AND HEATING?: NOT HARD AT ALL

## 2023-08-22 SDOH — ECONOMIC STABILITY: FOOD INSECURITY: WITHIN THE PAST 12 MONTHS, YOU WORRIED THAT YOUR FOOD WOULD RUN OUT BEFORE YOU GOT MONEY TO BUY MORE.: NEVER TRUE

## 2023-08-22 SDOH — ECONOMIC STABILITY: HOUSING INSECURITY
IN THE LAST 12 MONTHS, WAS THERE A TIME WHEN YOU DID NOT HAVE A STEADY PLACE TO SLEEP OR SLEPT IN A SHELTER (INCLUDING NOW)?: NO

## 2023-08-22 SDOH — ECONOMIC STABILITY: FOOD INSECURITY: WITHIN THE PAST 12 MONTHS, THE FOOD YOU BOUGHT JUST DIDN'T LAST AND YOU DIDN'T HAVE MONEY TO GET MORE.: NEVER TRUE

## 2023-08-22 NOTE — PATIENT INSTRUCTIONS
START WEEKLY INJECTIONS OF OZEMPIC, START AT 0.25MG FOR TWO DOSES, THEN IF TOLERATING OK INCR TO 0.5MG A WEEK.

## 2023-08-22 NOTE — PROGRESS NOTES
Well Adult Note  Name: Camelia Galan Date: 2023   MRN: [id number] Sex: Male   Age: 56 y.o. Ethnicity: Non- / Non    : 1965 Race: White (non-)      Chris Horowitz is here for well adult exam.  History: In the summer has periodic flaking of skin R hand only, lingers for a few months then resolves. Wife w eczema, responds to steroid creams. DM- not checking sugars, lab pending fr yesterday, we'll check poct today a1c. When does check runs ~130-150. Not on prandin, only on jardiance. Prandin caused sugars to drop. IF SUGARS STILL HIGH ON A1C TODAY WE'LL RECONSIDER PRANDIN JUST BID W BREAKFAST AND SUPPER. Lab Results   Component Value Date    LABA1C 9.6 2023    LABA1C 8.7 2022    LABA1C 10.0 2021     Lab Results   Component Value Date    MALBCR see below 2022    LDLCALC 43 2022    CREATININE 0.9 03/15/2022       NAFLD, WT STABLE BUT MOTHER W HX OF CIRRHOSIS  WT DOWN 10#. Review of Systems    Allergies   Allergen Reactions    Glipizide      HYPOGLYCEMIA EVEN AT LOW DOSES    Metformin And Related Diarrhea     Severe diarrhea         Prior to Visit Medications    Medication Sig Taking? Authorizing Provider   blood glucose test strips (ASCENSIA AUTODISC VI;ONE TOUCH ULTRA TEST VI) strip 1 each by In Vitro route daily As needed.  Yes Jez Becker MD   lisinopril (PRINIVIL;ZESTRIL) 2.5 MG tablet Take 1 tablet by mouth daily Yes Jez Becker MD   rosuvastatin (CRESTOR) 5 MG tablet Take 1 tablet by mouth nightly Yes Jez Becker MD   empagliflozin (JARDIANCE) 25 MG tablet Take 1 tablet by mouth daily Yes Jez Becker MD   ASPIRIN 81 PO Take 1 tablet by mouth Yes Historical Provider, MD   albuterol sulfate HFA (PROVENTIL HFA) 108 (90 Base) MCG/ACT inhaler Inhale 2 puffs into the lungs every 6 hours as needed for Wheezing Yes Jez Becker MD   repaglinide (PRANDIN) 1 MG tablet Take 1 tablet by mouth 3 times

## 2023-08-23 DIAGNOSIS — E11.21 TYPE 2 DIABETES MELLITUS WITH DIABETIC NEPHROPATHY, WITHOUT LONG-TERM CURRENT USE OF INSULIN (HCC): ICD-10-CM

## 2023-08-23 LAB
EST. AVERAGE GLUCOSE BLD GHB EST-MCNC: 223.1 MG/DL
HBA1C MFR BLD: 9.4 %

## 2023-08-23 RX ORDER — REPAGLINIDE 1 MG/1
1 TABLET ORAL
Qty: 180 TABLET | Refills: 1 | Status: SHIPPED | OUTPATIENT
Start: 2023-08-23 | End: 2024-02-19

## 2023-09-06 LAB — DIABETIC RETINOPATHY: POSITIVE

## 2023-11-21 ENCOUNTER — OFFICE VISIT (OUTPATIENT)
Dept: INTERNAL MEDICINE CLINIC | Age: 58
End: 2023-11-21
Payer: COMMERCIAL

## 2023-11-21 VITALS
RESPIRATION RATE: 16 BRPM | DIASTOLIC BLOOD PRESSURE: 68 MMHG | HEIGHT: 73 IN | SYSTOLIC BLOOD PRESSURE: 106 MMHG | OXYGEN SATURATION: 98 % | BODY MASS INDEX: 26.9 KG/M2 | HEART RATE: 74 BPM | WEIGHT: 203 LBS

## 2023-11-21 DIAGNOSIS — J45.20 MILD INTERMITTENT ASTHMA WITHOUT COMPLICATION: ICD-10-CM

## 2023-11-21 DIAGNOSIS — E11.21 TYPE 2 DIABETES MELLITUS WITH DIABETIC NEPHROPATHY, WITHOUT LONG-TERM CURRENT USE OF INSULIN (HCC): Primary | ICD-10-CM

## 2023-11-21 DIAGNOSIS — K76.0 NAFLD (NONALCOHOLIC FATTY LIVER DISEASE): ICD-10-CM

## 2023-11-21 PROCEDURE — 99214 OFFICE O/P EST MOD 30 MIN: CPT | Performed by: INTERNAL MEDICINE

## 2023-11-21 PROCEDURE — 3046F HEMOGLOBIN A1C LEVEL >9.0%: CPT | Performed by: INTERNAL MEDICINE

## 2023-11-21 PROCEDURE — 3074F SYST BP LT 130 MM HG: CPT | Performed by: INTERNAL MEDICINE

## 2023-11-21 PROCEDURE — 3078F DIAST BP <80 MM HG: CPT | Performed by: INTERNAL MEDICINE

## 2023-11-21 RX ORDER — SEMAGLUTIDE 1.34 MG/ML
1 INJECTION, SOLUTION SUBCUTANEOUS WEEKLY
Qty: 9 ML | Refills: 1 | Status: SHIPPED | OUTPATIENT
Start: 2023-11-21 | End: 2023-11-22 | Stop reason: SDUPTHER

## 2023-11-22 DIAGNOSIS — E11.21 TYPE 2 DIABETES MELLITUS WITH DIABETIC NEPHROPATHY, WITHOUT LONG-TERM CURRENT USE OF INSULIN (HCC): ICD-10-CM

## 2023-11-22 DIAGNOSIS — I10 PRIMARY HYPERTENSION: ICD-10-CM

## 2023-11-22 RX ORDER — LISINOPRIL 2.5 MG/1
2.5 TABLET ORAL DAILY
Qty: 90 TABLET | Refills: 1 | Status: SHIPPED | OUTPATIENT
Start: 2023-11-22

## 2023-11-22 RX ORDER — SEMAGLUTIDE 1.34 MG/ML
1 INJECTION, SOLUTION SUBCUTANEOUS WEEKLY
Qty: 9 ML | Refills: 1 | Status: SHIPPED | OUTPATIENT
Start: 2023-11-22

## 2023-11-22 RX ORDER — ROSUVASTATIN CALCIUM 5 MG/1
5 TABLET, COATED ORAL NIGHTLY
Qty: 90 TABLET | Refills: 1 | Status: SHIPPED | OUTPATIENT
Start: 2023-11-22

## 2023-12-13 DIAGNOSIS — E11.21 TYPE 2 DIABETES MELLITUS WITH DIABETIC NEPHROPATHY, WITHOUT LONG-TERM CURRENT USE OF INSULIN (HCC): ICD-10-CM

## 2023-12-13 DIAGNOSIS — K76.0 NAFLD (NONALCOHOLIC FATTY LIVER DISEASE): ICD-10-CM

## 2023-12-13 LAB
DEPRECATED RDW RBC AUTO: 12.6 % (ref 12.4–15.4)
HCT VFR BLD AUTO: 45.4 % (ref 40.5–52.5)
HGB BLD-MCNC: 16 G/DL (ref 13.5–17.5)
MCH RBC QN AUTO: 31.5 PG (ref 26–34)
MCHC RBC AUTO-ENTMCNC: 35.2 G/DL (ref 31–36)
MCV RBC AUTO: 89.4 FL (ref 80–100)
PLATELET # BLD AUTO: 203 K/UL (ref 135–450)
PMV BLD AUTO: 8.6 FL (ref 5–10.5)
RBC # BLD AUTO: 5.07 M/UL (ref 4.2–5.9)
WBC # BLD AUTO: 8.3 K/UL (ref 4–11)

## 2023-12-13 PROCEDURE — 36415 COLL VENOUS BLD VENIPUNCTURE: CPT | Performed by: INTERNAL MEDICINE

## 2023-12-14 LAB
ALBUMIN SERPL-MCNC: 4.6 G/DL (ref 3.4–5)
ALBUMIN/GLOB SERPL: 2.3 {RATIO} (ref 1.1–2.2)
ALP SERPL-CCNC: 104 U/L (ref 40–129)
ALT SERPL-CCNC: 26 U/L (ref 10–40)
ANION GAP SERPL CALCULATED.3IONS-SCNC: 10 MMOL/L (ref 3–16)
AST SERPL-CCNC: 18 U/L (ref 15–37)
BILIRUB SERPL-MCNC: 0.3 MG/DL (ref 0–1)
BUN SERPL-MCNC: 19 MG/DL (ref 7–20)
CALCIUM SERPL-MCNC: 8.9 MG/DL (ref 8.3–10.6)
CHLORIDE SERPL-SCNC: 103 MMOL/L (ref 99–110)
CHOLEST SERPL-MCNC: 88 MG/DL (ref 0–199)
CO2 SERPL-SCNC: 27 MMOL/L (ref 21–32)
CREAT SERPL-MCNC: 1 MG/DL (ref 0.9–1.3)
EST. AVERAGE GLUCOSE BLD GHB EST-MCNC: 191.5 MG/DL
GFR SERPLBLD CREATININE-BSD FMLA CKD-EPI: >60 ML/MIN/{1.73_M2}
GLUCOSE SERPL-MCNC: 207 MG/DL (ref 70–99)
HBA1C MFR BLD: 8.3 %
HDLC SERPL-MCNC: 41 MG/DL (ref 40–60)
LDLC SERPL CALC-MCNC: 35 MG/DL
POTASSIUM SERPL-SCNC: 4.6 MMOL/L (ref 3.5–5.1)
PROT SERPL-MCNC: 6.6 G/DL (ref 6.4–8.2)
SODIUM SERPL-SCNC: 140 MMOL/L (ref 136–145)
TRIGL SERPL-MCNC: 61 MG/DL (ref 0–150)
VLDLC SERPL CALC-MCNC: 12 MG/DL

## 2023-12-14 NOTE — RESULT ENCOUNTER NOTE
Call pt, labs ok/chol ok w A1c much better dropping from 9.4--8.3, but could be better still preferrably in 7s range. Pls continue working on strict DM diet and cont current DM meds.

## 2024-02-20 ENCOUNTER — HOSPITAL ENCOUNTER (EMERGENCY)
Age: 59
Discharge: ANOTHER HOSPITAL | End: 2024-02-21
Attending: EMERGENCY MEDICINE
Payer: COMMERCIAL

## 2024-02-20 DIAGNOSIS — T18.128A FOOD IMPACTION OF ESOPHAGUS, INITIAL ENCOUNTER: Primary | ICD-10-CM
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
## 2024-02-20 PROCEDURE — 96361 HYDRATE IV INFUSION ADD-ON: CPT

## 2024-02-20 PROCEDURE — 6360000002 HC RX W HCPCS: Performed by: EMERGENCY MEDICINE

## 2024-02-20 PROCEDURE — 96360 HYDRATION IV INFUSION INIT: CPT

## 2024-02-20 PROCEDURE — 96372 THER/PROPH/DIAG INJ SC/IM: CPT

## 2024-02-20 PROCEDURE — 99285 EMERGENCY DEPT VISIT HI MDM: CPT

## 2024-02-20 RX ADMIN — GLUCAGON 1 MG: KIT at 23:45

## 2024-02-20 ASSESSMENT — PAIN - FUNCTIONAL ASSESSMENT: PAIN_FUNCTIONAL_ASSESSMENT: NONE - DENIES PAIN

## 2024-02-21 ENCOUNTER — ANESTHESIA (OUTPATIENT)
Dept: ENDOSCOPY | Age: 59
End: 2024-02-21
Payer: COMMERCIAL

## 2024-02-21 ENCOUNTER — ANESTHESIA EVENT (OUTPATIENT)
Dept: ENDOSCOPY | Age: 59
End: 2024-02-21
Payer: COMMERCIAL

## 2024-02-21 ENCOUNTER — HOSPITAL ENCOUNTER (EMERGENCY)
Age: 59
Discharge: HOME | End: 2024-02-21
Attending: EMERGENCY MEDICINE
Payer: COMMERCIAL

## 2024-02-21 VITALS
DIASTOLIC BLOOD PRESSURE: 87 MMHG | TEMPERATURE: 98.1 F | HEIGHT: 72 IN | HEART RATE: 87 BPM | WEIGHT: 198 LBS | BODY MASS INDEX: 26.82 KG/M2 | SYSTOLIC BLOOD PRESSURE: 119 MMHG | OXYGEN SATURATION: 97 % | RESPIRATION RATE: 16 BRPM

## 2024-02-21 VITALS
SYSTOLIC BLOOD PRESSURE: 127 MMHG | HEART RATE: 96 BPM | RESPIRATION RATE: 16 BRPM | DIASTOLIC BLOOD PRESSURE: 93 MMHG | TEMPERATURE: 97.6 F | OXYGEN SATURATION: 98 %

## 2024-02-21 DIAGNOSIS — T18.108A FOREIGN BODY IN ESOPHAGUS, INITIAL ENCOUNTER: Primary | ICD-10-CM

## 2024-02-21 LAB
GLUCOSE BLD-MCNC: 106 MG/DL (ref 70–99)
GLUCOSE BLD-MCNC: 133 MG/DL (ref 70–99)

## 2024-02-21 PROCEDURE — 99283 EMERGENCY DEPT VISIT LOW MDM: CPT

## 2024-02-21 PROCEDURE — 3700000000 HC ANESTHESIA ATTENDED CARE: Performed by: SPECIALIST

## 2024-02-21 PROCEDURE — 7100000001 HC PACU RECOVERY - ADDTL 15 MIN: Performed by: SPECIALIST

## 2024-02-21 PROCEDURE — 6360000002 HC RX W HCPCS: Performed by: NURSE ANESTHETIST, CERTIFIED REGISTERED

## 2024-02-21 PROCEDURE — 2580000003 HC RX 258: Performed by: EMERGENCY MEDICINE

## 2024-02-21 PROCEDURE — 3700000001 HC ADD 15 MINUTES (ANESTHESIA): Performed by: SPECIALIST

## 2024-02-21 PROCEDURE — 2500000003 HC RX 250 WO HCPCS: Performed by: NURSE ANESTHETIST, CERTIFIED REGISTERED

## 2024-02-21 PROCEDURE — 7100000011 HC PHASE II RECOVERY - ADDTL 15 MIN: Performed by: SPECIALIST

## 2024-02-21 PROCEDURE — 2580000003 HC RX 258: Performed by: NURSE ANESTHETIST, CERTIFIED REGISTERED

## 2024-02-21 PROCEDURE — 7100000010 HC PHASE II RECOVERY - FIRST 15 MIN: Performed by: SPECIALIST

## 2024-02-21 PROCEDURE — 3609012900 HC EGD FOREIGN BODY REMOVAL: Performed by: SPECIALIST

## 2024-02-21 PROCEDURE — 82962 GLUCOSE BLOOD TEST: CPT

## 2024-02-21 PROCEDURE — 2709999900 HC NON-CHARGEABLE SUPPLY: Performed by: SPECIALIST

## 2024-02-21 PROCEDURE — 2720000010 HC SURG SUPPLY STERILE: Performed by: SPECIALIST

## 2024-02-21 PROCEDURE — 7100000000 HC PACU RECOVERY - FIRST 15 MIN: Performed by: SPECIALIST

## 2024-02-21 RX ORDER — FENTANYL CITRATE 50 UG/ML
50 INJECTION, SOLUTION INTRAMUSCULAR; INTRAVENOUS EVERY 5 MIN PRN
Status: DISCONTINUED | OUTPATIENT
Start: 2024-02-21 | End: 2024-02-21 | Stop reason: HOSPADM

## 2024-02-21 RX ORDER — LABETALOL HYDROCHLORIDE 5 MG/ML
10 INJECTION, SOLUTION INTRAVENOUS
Status: DISCONTINUED | OUTPATIENT
Start: 2024-02-21 | End: 2024-02-21 | Stop reason: HOSPADM

## 2024-02-21 RX ORDER — SODIUM CHLORIDE 9 MG/ML
INJECTION, SOLUTION INTRAVENOUS PRN
Status: DISCONTINUED | OUTPATIENT
Start: 2024-02-21 | End: 2024-02-21 | Stop reason: HOSPADM

## 2024-02-21 RX ORDER — SODIUM CHLORIDE, SODIUM LACTATE, POTASSIUM CHLORIDE, CALCIUM CHLORIDE 600; 310; 30; 20 MG/100ML; MG/100ML; MG/100ML; MG/100ML
INJECTION, SOLUTION INTRAVENOUS CONTINUOUS PRN
Status: DISCONTINUED | OUTPATIENT
Start: 2024-02-21 | End: 2024-02-21 | Stop reason: SDUPTHER

## 2024-02-21 RX ORDER — SODIUM CHLORIDE 0.9 % (FLUSH) 0.9 %
5-40 SYRINGE (ML) INJECTION EVERY 12 HOURS SCHEDULED
Status: DISCONTINUED | OUTPATIENT
Start: 2024-02-21 | End: 2024-02-21 | Stop reason: HOSPADM

## 2024-02-21 RX ORDER — SODIUM CHLORIDE 9 MG/ML
INJECTION, SOLUTION INTRAVENOUS CONTINUOUS
Status: DISCONTINUED | OUTPATIENT
Start: 2024-02-21 | End: 2024-02-21 | Stop reason: HOSPADM

## 2024-02-21 RX ORDER — ROCURONIUM BROMIDE 10 MG/ML
INJECTION, SOLUTION INTRAVENOUS PRN
Status: DISCONTINUED | OUTPATIENT
Start: 2024-02-21 | End: 2024-02-21 | Stop reason: SDUPTHER

## 2024-02-21 RX ORDER — DROPERIDOL 2.5 MG/ML
0.62 INJECTION, SOLUTION INTRAMUSCULAR; INTRAVENOUS
Status: DISCONTINUED | OUTPATIENT
Start: 2024-02-21 | End: 2024-02-21 | Stop reason: HOSPADM

## 2024-02-21 RX ORDER — ONDANSETRON 2 MG/ML
4 INJECTION INTRAMUSCULAR; INTRAVENOUS
Status: DISCONTINUED | OUTPATIENT
Start: 2024-02-21 | End: 2024-02-21 | Stop reason: HOSPADM

## 2024-02-21 RX ORDER — ESMOLOL HYDROCHLORIDE 10 MG/ML
INJECTION INTRAVENOUS PRN
Status: DISCONTINUED | OUTPATIENT
Start: 2024-02-21 | End: 2024-02-21 | Stop reason: SDUPTHER

## 2024-02-21 RX ORDER — DEXAMETHASONE SODIUM PHOSPHATE 4 MG/ML
INJECTION, SOLUTION INTRA-ARTICULAR; INTRALESIONAL; INTRAMUSCULAR; INTRAVENOUS; SOFT TISSUE PRN
Status: DISCONTINUED | OUTPATIENT
Start: 2024-02-21 | End: 2024-02-21 | Stop reason: SDUPTHER

## 2024-02-21 RX ORDER — HYDRALAZINE HYDROCHLORIDE 20 MG/ML
10 INJECTION INTRAMUSCULAR; INTRAVENOUS
Status: DISCONTINUED | OUTPATIENT
Start: 2024-02-21 | End: 2024-02-21 | Stop reason: HOSPADM

## 2024-02-21 RX ORDER — SODIUM CHLORIDE 0.9 % (FLUSH) 0.9 %
5-40 SYRINGE (ML) INJECTION PRN
Status: DISCONTINUED | OUTPATIENT
Start: 2024-02-21 | End: 2024-02-21 | Stop reason: HOSPADM

## 2024-02-21 RX ORDER — OXYCODONE HYDROCHLORIDE 5 MG/1
5 TABLET ORAL
Status: DISCONTINUED | OUTPATIENT
Start: 2024-02-21 | End: 2024-02-21 | Stop reason: HOSPADM

## 2024-02-21 RX ORDER — LIDOCAINE HYDROCHLORIDE 20 MG/ML
INJECTION, SOLUTION INTRAVENOUS PRN
Status: DISCONTINUED | OUTPATIENT
Start: 2024-02-21 | End: 2024-02-21 | Stop reason: SDUPTHER

## 2024-02-21 RX ORDER — MEPERIDINE HYDROCHLORIDE 25 MG/ML
12.5 INJECTION INTRAMUSCULAR; INTRAVENOUS; SUBCUTANEOUS EVERY 5 MIN PRN
Status: DISCONTINUED | OUTPATIENT
Start: 2024-02-21 | End: 2024-02-21 | Stop reason: HOSPADM

## 2024-02-21 RX ORDER — ONDANSETRON 2 MG/ML
INJECTION INTRAMUSCULAR; INTRAVENOUS PRN
Status: DISCONTINUED | OUTPATIENT
Start: 2024-02-21 | End: 2024-02-21 | Stop reason: SDUPTHER

## 2024-02-21 RX ORDER — PROPOFOL 10 MG/ML
INJECTION, EMULSION INTRAVENOUS PRN
Status: DISCONTINUED | OUTPATIENT
Start: 2024-02-21 | End: 2024-02-21 | Stop reason: SDUPTHER

## 2024-02-21 RX ADMIN — SUGAMMADEX 400 MG: 100 INJECTION, SOLUTION INTRAVENOUS at 11:46

## 2024-02-21 RX ADMIN — SODIUM CHLORIDE, POTASSIUM CHLORIDE, SODIUM LACTATE AND CALCIUM CHLORIDE: 600; 310; 30; 20 INJECTION, SOLUTION INTRAVENOUS at 11:25

## 2024-02-21 RX ADMIN — DEXAMETHASONE SODIUM PHOSPHATE 4 MG: 4 INJECTION, SOLUTION INTRAMUSCULAR; INTRAVENOUS at 11:32

## 2024-02-21 RX ADMIN — PROPOFOL 200 MG: 10 INJECTION, EMULSION INTRAVENOUS at 11:29

## 2024-02-21 RX ADMIN — SODIUM CHLORIDE: 9 INJECTION, SOLUTION INTRAVENOUS at 01:50

## 2024-02-21 RX ADMIN — ONDANSETRON 4 MG: 2 INJECTION INTRAMUSCULAR; INTRAVENOUS at 11:32

## 2024-02-21 RX ADMIN — LIDOCAINE HYDROCHLORIDE 100 MG: 20 INJECTION, SOLUTION INTRAVENOUS at 11:29

## 2024-02-21 RX ADMIN — ROCURONIUM BROMIDE 50 MG: 10 INJECTION, SOLUTION INTRAVENOUS at 11:29

## 2024-02-21 RX ADMIN — ESMOLOL HYDROCHLORIDE 20 MG: 10 INJECTION, SOLUTION INTRAVENOUS at 11:37

## 2024-02-21 ASSESSMENT — PAIN - FUNCTIONAL ASSESSMENT
PAIN_FUNCTIONAL_ASSESSMENT: 0-10

## 2024-02-21 ASSESSMENT — ENCOUNTER SYMPTOMS
EYES NEGATIVE: 1
RESPIRATORY NEGATIVE: 1
GASTROINTESTINAL NEGATIVE: 1

## 2024-02-21 NOTE — PROGRESS NOTES
1115 In to check on pt. Pt resting with eyes closed. Respirations even and unlabored. Wife at bedside. Call light in reach.

## 2024-02-21 NOTE — ED PROVIDER NOTES
with esophageal foreign body.  He is hemodynamically stable.  Still not able to tolerate secretions but resting comfortably and not in respiratory distress.  Plan for upper endoscopy, plan discharge home following.    0600:  I have signed out Mike Diaz's Emergency Department care to Dr. Malone. We discussed the pertinent history, physical exam, completed/pending test results (if applicable) and current treatment plan. Please refer to his/her chart for the patients remaining Emergency Department course and final disposition.        History from : Patient    Limitations to history : None    Patient was given the following medications:  Medications - No data to display    Independent Imaging Interpretation by me:     EKG (if obtained): (All EKG's are interpreted by myself in the absence of a cardiologist)     Chronic conditions affecting care:     Discussion with Other Profesionals : None    Social Determinants : None    Disposition Considerations (tests considered but not done, Shared Decision Making, Pt Expectation of Test or Tx.):       I am the Primary Clinician of Record.                  Clinical Impression:  1. Foreign body in esophagus, initial encounter      (Please note that portions of this note may have been completed with a voice recognition program. Efforts were made to edit the dictations but occasionally words are mis-transcribed.)    MD EDDIE Garrett Ryan, MD  02/21/24 [id number]

## 2024-02-21 NOTE — DISCHARGE INSTRUCTIONS
EGD    DR.__________________________________    OFFICE NUMBER___________________    FOLLOW UP APPOINTMENT IN ________DAYS/WEEKS OR AS NEEDED.    REPEAT PROCEDURE IN ________YEARS OR AS  NEEDED.    TEST ORDERED:NONE OR___________________________________________________________________.    What to Expect at Home  Your Recovery:  The only discomfort after your EGD is generally limited to a mild soreness of the throat, which may last a day or two. Call your physician immediately if you have severe chest pain, shortness of breath or a temperature of 100 degrees or higher if taken orally.    How can you care for yourself at home?  Activity  Rest as much as you need to after you go home.  You should be able to go back to your usual activities the day after the test.  Diet  Follow your doctor's directions for eating after the test.  Drink plenty of fluids (unless your doctor has told you not to).  Medications  If you have a sore throat the day after the test, use an over-the-counter spray to numb your throat.  Your doctor will tell you if and when you can restart your medicines. He or she will also give you instructions about taking any new medicines.  If you take blood thinners, such as warfarin (Coumadin), clopidogrel (Plavix), or aspirin, be sure to talk to your doctor. He or she will tell you if and when to start taking those medicines again. Make sure that you understand exactly what your doctor wants you to do.  If a biopsy was done during the test, your doctor may tell you not to take aspirin or other anti-inflammatory medicines for a few days. These include ibuprofen (Advil, Motrin) and naproxen (Aleve).  DO NOT DRINK ANYTHING WITH ALCOHOL TODAY.    Other instructions:Anesthesia  For your safety, do not drive or operate machinery for 24 hours.   Do not sign legal documents or make major decisions for 24 hours. The anesthesia can make it hard for you to fully understand what you are agreeing to.      Follow-up care

## 2024-02-21 NOTE — BRIEF OP NOTE
Brief Postoperative Note      Mike Diaz is a 58 y.o. male     Pre-operative Diagnosis: ESOPHAGEAL FB    Post-operative Diagnosis: DISTAL ESOPHAGEAL STRICTURE WITH FB    Procedure: EGD WITH FB REMOVAL    Anesthesia: GEN    Surgeons/Assistants:Jayla Ashford MD     Estimated Blood Loss: NIL    Complications: None    Specimens: were not obtained  REC---CLD TODAY / PPI / F/U EGD WITH DILATION IN 2 WKS  Jayla Ashford MD   2/21/2024   11:46 AM

## 2024-02-21 NOTE — PROGRESS NOTES
PT TRANSFERRED TO PACU ON ENDO CART ON ROOM AIR WITH THIS NURSE AND CRNA. PT AWAKE AND RESPONSIVE. DENIES C/O OF NEEDS AT THIS TIME. NO SIGNS OR SYMPTOMS OF DISTRESS NOTED AT THIS TIME. REPORT GIVEN TO ALICIA VEGA.

## 2024-02-21 NOTE — OP NOTE
slightly hyperemic, but no erosion or ulcers were seen.    POSTOPERATIVE DIAGNOSES:    1. Distal esophageal stricture noted with a large bolus of food lodged at the stricture.  2. Status post removal of esophageal foreign body.  3. Moderate to severe distal esophagitis.  4. Retained food particles noted in the fundus of the stomach.  5. Mild duodenitis.    RECOMMENDATION:    1. Anti-reflux measures.  2. Tablet Nexium 40 mg p.o. q.a.m.  3. Clear liquid diet today and a normal diet from tomorrow a.m.  4. The patient has been instructed to call the office in 2 weeks' time to obtain biopsies from the esophagus and also to perform esophageal dilatation as well.      The patient tolerated the procedure well.  There were no postop complications.  The blood loss during the procedure was nil.          SANDRA CORDERO MD      D:  02/21/2024 11:50:44     T:  02/21/2024 12:48:14     AR/PARRIS  Job #:  [id number]     Doc#:  [id number]    CC:   Dr. Rowe

## 2024-02-21 NOTE — ANESTHESIA POSTPROCEDURE EVALUATION
Department of Anesthesiology  Postprocedure Note    Patient: Mike Diaz  MRN: [id number]  YOB: 1965  Date of evaluation: 2/21/2024    Procedure Summary       Date: 02/21/24 Room / Location: [place] [address] / [facility]    Anesthesia Start: 1125 Anesthesia Stop: 1202    Procedure: EGD FOREIGN BODY REMOVAL Diagnosis:       Food bolus obstruction of intestine (HCC)      (Food bolus obstruction of intestine (HCC) [K56.699, W44.F3XA])    Surgeons: Jayla Ashford MD Responsible Provider: Washington Hurley MD    Anesthesia Type: Not recorded ASA Status: Not recorded            Anesthesia Type: No value filed.    González Phase I: González Score: 10    González Phase II:      Anesthesia Post Evaluation    Patient location during evaluation: bedside  Patient participation: complete - patient participated  Level of consciousness: awake and alert  Pain score: 0  Airway patency: patent  Nausea & Vomiting: no vomiting and no nausea  Cardiovascular status: blood pressure returned to baseline and hemodynamically stable  Respiratory status: acceptable, room air, spontaneous ventilation and nonlabored ventilation  Hydration status: stable  Pain management: adequate    No notable events documented.

## 2024-02-21 NOTE — ANESTHESIA PRE PROCEDURE
mother w hx of cirrhosis    Screening for colorectal cancer     COLOGUARD NEG 8/2021- RECHECK DUE 3 YRS       Past Surgical History:        Procedure Laterality Date    ROTATOR CUFF REPAIR Right 2005    right     SEPTOPLASTY  1990's    SHOULDER ARTHROSCOPY Left 1/31/2022    LEFT SHOULDER ARTHROSCOPY ROTATOR CUFF DEBRIDMENT VERSUS REPAIR, SUBACROMIAL DECOMPRESSION, DISTAL CLAVICLE RESECTION, BICEPS TENODESIS performed by Jarocho Samson DO at [facility] OR       Social History:    Social History     Tobacco Use    Smoking status: Former     Current packs/day: 0.50     Average packs/day: 0.5 packs/day for 10.0 years (5.0 ttl pk-yrs)     Types: Cigarettes    Smokeless tobacco: Current     Types: Chew    Tobacco comments:     E Cig on regular basis   Substance Use Topics    Alcohol use: Yes     Alcohol/week: 0.0 standard drinks of alcohol     Comment: Occasional                                 Ready to quit: Not Answered  Counseling given: Not Answered  Tobacco comments: E Cig on regular basis      Vital Signs (Current):   Vitals:    02/21/24 1010 02/21/24 1200 02/21/24 1202 02/21/24 1203   BP:  120/89  120/89   Pulse: 98 (!) 106 (!) 105 (!) 102   Resp: 16 18  14   Temp:  36.9 °C (98.4 °F)  36.9 °C (98.4 °F)   TempSrc:  Tympanic  Temporal   SpO2: 96% 95%  94%                                              BP Readings from Last 3 Encounters:   02/21/24 120/89   02/21/24 119/87   11/21/23 106/68       NPO Status: Time of last liquid consumption: 1300                        Time of last solid consumption: 1300                        Date of last liquid consumption: 02/21/24                        Date of last solid food consumption: 02/21/24    BMI:   Wt Readings from Last 3 Encounters:   02/20/24 89.8 kg (198 lb)   11/21/23 92.1 kg (203 lb)   08/22/23 92.5 kg (204 lb)     There is no height or weight on file to calculate BMI.    CBC:   Lab Results   Component Value Date/Time    WBC 8.3 12/13/2023 03:13 PM    RBC 5.07

## 2024-02-21 NOTE — ED PROVIDER NOTES
Foreign Body  Patient reports the emergency department that he believes he has a foreign body in his throat.  The patient was eating chicken gathers and he believes that a chicken because it is stuck in his esophagus.  Patient states he been having difficulty swallowing since the incident had occurred.  Patient did not have any choking episode he denies any difficulty breathing.  The patient states that there has not been any excessive drooling or cyanosis.  Patient is having trouble swallowing    Review of Systems   Constitutional: Negative.    HENT: Negative.     Eyes: Negative.    Respiratory: Negative.     Cardiovascular: Negative.    Gastrointestinal: Negative.    Genitourinary: Negative.    Musculoskeletal: Negative.    Skin: Negative.    Neurological: Negative.    All other systems reviewed and are negative.      Family History   Problem Relation Age of Onset    High Cholesterol Mother     High Blood Pressure Mother     Diabetes Mother     Heart Disease Mother         CAD?    Thyroid Disease Mother         Hypothyroid     Social History     Socioeconomic History    Marital status:      Spouse name: Not on file    Number of children: Not on file    Years of education: Not on file    Highest education level: Not on file   Occupational History    Occupation: [employer]     Comment:     Occupation: [employer]    Occupation: [employer]     Comment: since 4/2019   Tobacco Use    Smoking status: Former     Current packs/day: 0.50     Average packs/day: 0.5 packs/day for 10.0 years (5.0 ttl pk-yrs)     Types: Cigarettes    Smokeless tobacco: Current     Types: Chew    Tobacco comments:     E Cig on regular basis   Vaping Use    Vaping Use: Every day   Substance and Sexual Activity    Alcohol use: Yes     Alcohol/week: 0.0 standard drinks of alcohol     Comment: Occasional     Drug use: Never    Sexual activity: Yes     Partners: Female   Other Topics Concern    Not on file   Social History Narrative

## 2024-02-21 NOTE — PROGRESS NOTES
Transferred to same day room 5 per stretcher. Patient alert and oriented. Respirations even and unlabored. Color pink. Able to speak without difficulty. Monitors applied. VS and accucheck obtained. Wife at bedside. Call light in reach.

## 2024-02-21 NOTE — PROGRESS NOTES
1223- Returned to room 5. Report received from Mik VEGA. Alert and oriented. Respirations even and unlabored. Color pink. Abdomen soft and nontender. Denies any pain. Beverage provided. Instructed on clear liquid diet for today. Wife at bedside. Call light in reach.  9844- Out to car. Home with wife.

## 2024-02-21 NOTE — CONSULTS
[facility]           [address] [place] [id number]                              CONSULTATION      PATIENT NAME: KYLE BURGOS               : 1965  MED REC NO: [id number]                      ROOM: ED26  ACCOUNT NO: [id number]                       ADMIT DATE: 2024  PROVIDER: Jayla Ashford MD      REFERRING PHYSICIAN:  Dr. Rowe    CHIEF COMPLAINT:  Acute onset of dysphagia; rule out esophageal obstruction/foreign body.    HISTORY OF PRESENT ILLNESS:  The patient is a 58-year-old white gentleman, a patient of Dr. Rowe with past medical history significant for hypertension, diabetes mellitus, asthma, history of COVID-19 infection in , nonalcoholic fatty liver disease, otherwise in apparently good health, who presented to [facility] in [place] with acute onset of dysphagia after eating a piece of chicken gizzard around 1 p.m. on the 2024.  The patient has been unable to swallow his saliva or drink water.  The patient presented to the emergency room at [facility] in [place], where he was given IV glucagon with no relief.  Subsequently, the case was discussed with the ER physician and the patient was transferred to [facility] for EGD and esophageal foreign body removal.    According to the patient, he does have history of intermittent dysphagia with solids for the past 5 years, but he gets relief spontaneously and never had to come to the emergency room.  The patient has never had an EGD done, but did have a couple of colonoscopies done and the patient had a colonoscopy done by Dr. Rich on the 2011 and had colon polyps removed, and his last colonoscopy was approximately 5 years ago.  The patient is hemodynamically stable with no evidence of respiratory distress.    REVIEW OF SYSTEMS:  CENTRAL NERVOUS SYSTEM:  The patient denies headache or focal sensorimotor

## 2024-02-21 NOTE — ED TRIAGE NOTES
Patient states that he has food stuck in his throat. Sent from [facility] for further work up.

## 2024-02-26 RX ORDER — BLOOD-GLUCOSE SENSOR
1 EACH MISCELLANEOUS CONTINUOUS
Qty: 2 EACH | Refills: 5 | Status: SHIPPED | OUTPATIENT
Start: 2024-02-26 | End: 2024-03-01 | Stop reason: SDUPTHER

## 2024-03-01 RX ORDER — BLOOD-GLUCOSE SENSOR
1 EACH MISCELLANEOUS CONTINUOUS
Qty: 2 EACH | Refills: 5 | Status: SHIPPED | OUTPATIENT
Start: 2024-03-01

## 2024-03-05 RX ORDER — ACYCLOVIR 400 MG/1
1 TABLET ORAL CONTINUOUS
Qty: 3 EACH | Refills: 5 | Status: SHIPPED | OUTPATIENT
Start: 2024-03-05

## 2024-03-25 DIAGNOSIS — E11.21 TYPE 2 DIABETES MELLITUS WITH DIABETIC NEPHROPATHY, WITHOUT LONG-TERM CURRENT USE OF INSULIN (HCC): ICD-10-CM

## 2024-03-25 RX ORDER — ACYCLOVIR 400 MG/1
1 TABLET ORAL CONTINUOUS
Qty: 3 EACH | Refills: 5 | OUTPATIENT
Start: 2024-03-25

## 2024-03-25 RX ORDER — EMPAGLIFLOZIN 25 MG/1
25 TABLET, FILM COATED ORAL DAILY
Qty: 90 TABLET | Refills: 1 | Status: SHIPPED | OUTPATIENT
Start: 2024-03-25

## 2024-03-25 NOTE — TELEPHONE ENCOUNTER
Value Ref Range Status   12/13/2023 >60 >60 Final     Comment:     Pediatric calculator link  https://www.kidney.org/professionals/kdoqi/gfr_calculatorped  Effective Oct 3, 2022  These results are not intended for use in patients  <18 years of age.  eGFR results are calculated without  a race factor using the 2021 CKD-EPI equation.  Careful  clinical correlation is recommended, particularly when  comparing to results calculated using previous equations.  The CKD-EPI equation is less accurate in patients with  extremes of muscle mass, extra-renal metabolism of  creatinine, excessive creatinine ingestion, or following  therapy that affects renal tubular secretion.          CMP:  Sodium   Date Value Ref Range Status   12/13/2023 140 136 - 145 mmol/L Final     Potassium   Date Value Ref Range Status   12/13/2023 4.6 3.5 - 5.1 mmol/L Final     Chloride   Date Value Ref Range Status   12/13/2023 103 99 - 110 mmol/L Final     CO2   Date Value Ref Range Status   12/13/2023 27 21 - 32 mmol/L Final     BUN   Date Value Ref Range Status   12/13/2023 19 7 - 20 mg/dL Final     Creatinine   Date Value Ref Range Status   12/13/2023 1.0 0.9 - 1.3 mg/dL Final     Glucose   Date Value Ref Range Status   12/13/2023 207 (H) 70 - 99 mg/dL Final     Calcium   Date Value Ref Range Status   12/13/2023 8.9 8.3 - 10.6 mg/dL Final     Total Protein   Date Value Ref Range Status   12/13/2023 6.6 6.4 - 8.2 g/dL Final     Albumin   Date Value Ref Range Status   12/13/2023 4.6 3.4 - 5.0 g/dL Final     Total Bilirubin   Date Value Ref Range Status   12/13/2023 0.3 0.0 - 1.0 mg/dL Final     Alkaline Phosphatase   Date Value Ref Range Status   12/13/2023 104 40 - 129 U/L Final     AST   Date Value Ref Range Status   12/13/2023 18 15 - 37 U/L Final     ALT   Date Value Ref Range Status   12/13/2023 26 10 - 40 U/L Final     Est, Glom Filt Rate   Date Value Ref Range Status   12/13/2023 >60 >60 Final     Comment:     Pediatric calculator

## 2024-06-24 ENCOUNTER — OFFICE VISIT (OUTPATIENT)
Dept: INTERNAL MEDICINE CLINIC | Age: 59
End: 2024-06-24
Payer: COMMERCIAL

## 2024-06-24 VITALS
HEIGHT: 72 IN | WEIGHT: 202.4 LBS | HEART RATE: 76 BPM | BODY MASS INDEX: 27.41 KG/M2 | SYSTOLIC BLOOD PRESSURE: 120 MMHG | DIASTOLIC BLOOD PRESSURE: 80 MMHG | OXYGEN SATURATION: 99 %

## 2024-06-24 DIAGNOSIS — E78.2 HYPERLIPEMIA, MIXED: ICD-10-CM

## 2024-06-24 DIAGNOSIS — K76.0 NAFLD (NONALCOHOLIC FATTY LIVER DISEASE): ICD-10-CM

## 2024-06-24 DIAGNOSIS — Z12.11 SCREENING FOR COLORECTAL CANCER: ICD-10-CM

## 2024-06-24 DIAGNOSIS — Z00.00 ROUTINE GENERAL MEDICAL EXAMINATION AT A HEALTH CARE FACILITY: Primary | ICD-10-CM

## 2024-06-24 DIAGNOSIS — Z12.12 SCREENING FOR COLORECTAL CANCER: ICD-10-CM

## 2024-06-24 DIAGNOSIS — E11.21 TYPE 2 DIABETES MELLITUS WITH DIABETIC NEPHROPATHY, WITHOUT LONG-TERM CURRENT USE OF INSULIN (HCC): ICD-10-CM

## 2024-06-24 DIAGNOSIS — I10 PRIMARY HYPERTENSION: ICD-10-CM

## 2024-06-24 DIAGNOSIS — J45.20 MILD INTERMITTENT ASTHMA WITHOUT COMPLICATION: ICD-10-CM

## 2024-06-24 PROCEDURE — 99396 PREV VISIT EST AGE 40-64: CPT | Performed by: INTERNAL MEDICINE

## 2024-06-24 PROCEDURE — 3079F DIAST BP 80-89 MM HG: CPT | Performed by: INTERNAL MEDICINE

## 2024-06-24 PROCEDURE — 3074F SYST BP LT 130 MM HG: CPT | Performed by: INTERNAL MEDICINE

## 2024-06-24 RX ORDER — ACYCLOVIR 400 MG/1
1 TABLET ORAL CONTINUOUS
Qty: 3 EACH | Refills: 5 | Status: SHIPPED | OUTPATIENT
Start: 2024-06-24

## 2024-06-24 RX ORDER — ACYCLOVIR 400 MG/1
1 TABLET ORAL CONTINUOUS
Qty: 3 EACH | Refills: 5 | Status: SHIPPED | OUTPATIENT
Start: 2024-06-24 | End: 2024-06-24 | Stop reason: SDUPTHER

## 2024-06-24 RX ORDER — LISINOPRIL 2.5 MG/1
2.5 TABLET ORAL DAILY
Qty: 90 TABLET | Refills: 1 | Status: SHIPPED | OUTPATIENT
Start: 2024-06-24

## 2024-06-24 RX ORDER — ROSUVASTATIN CALCIUM 5 MG/1
5 TABLET, COATED ORAL NIGHTLY
Qty: 90 TABLET | Refills: 1 | Status: SHIPPED | OUTPATIENT
Start: 2024-06-24

## 2024-06-24 NOTE — PROGRESS NOTES
Mike Diaz  1965 06/24/24  Here for gen physical      SUBJECTIVE:  dm- sugars improving significantly on ozempic which ins covers.  We discussed now try incr fr 1--2mg weekl dose.    Lab Results   Component Value Date    LABA1C 8.3 12/13/2023    LABA1C 9.4 08/22/2023    LABA1C 9.6 02/22/2023     Lab Results   Component Value Date    MALBCR 32.2 (H) 08/22/2023    CREATININE 1.0 12/13/2023       Nafld, mother w cirrhosis fr this, advised him that the GLP may also help stabilize this.    Hypertension: Stable. Denies CP, SOB, cough, visual changes, dizziness, palpitations or HA.       Asthma:  Patient denies significant chest pain/tightness, dyspnea, decreased exercise tolerance, cough, or wheezing on current regimen.    Lipids:  Is continuing statin therapy and low fat diet.  Tolerating medications w/o myalgias or GI upset.      Asthma:  Patient denies significant chest pain/tightness, dyspnea, decreased exercise tolerance, cough, or wheezing on current regimen.    Due for colorectal screening    .hias      OBJECTIVE:    /80 (Site: Left Upper Arm, Position: Sitting, Cuff Size: Medium Adult)   Pulse 76   Ht 1.829 m (6' 0.01\")   Wt 91.8 kg (202 lb 6.4 oz)   SpO2 99%   BMI 27.44 kg/m²     Physical Exam  Constitutional:       Appearance: Normal appearance.   HENT:      Head: Normocephalic and atraumatic.   Eyes:      Extraocular Movements: Extraocular movements intact.      Conjunctiva/sclera: Conjunctivae normal.      Pupils: Pupils are equal, round, and reactive to light.   Cardiovascular:      Rate and Rhythm: Normal rate and regular rhythm.      Heart sounds: Normal heart sounds. No murmur heard.  Pulmonary:      Effort: Pulmonary effort is normal. No respiratory distress.      Breath sounds: Normal breath sounds.   Abdominal:      General: Abdomen is flat. Bowel sounds are normal. There is no distension.      Palpations: Abdomen is soft. There is no mass.      Tenderness: There is no abdominal

## 2024-07-29 LAB — NONINV COLON CA DNA+OCC BLD SCRN STL QL: NEGATIVE

## 2024-09-18 LAB — DIABETIC RETINOPATHY: NEGATIVE

## 2024-10-11 DIAGNOSIS — E11.21 TYPE 2 DIABETES MELLITUS WITH DIABETIC NEPHROPATHY, WITHOUT LONG-TERM CURRENT USE OF INSULIN (HCC): ICD-10-CM

## 2024-10-11 RX ORDER — ACYCLOVIR 400 MG/1
TABLET ORAL
Qty: 3 EACH | Refills: 5 | Status: SHIPPED | OUTPATIENT
Start: 2024-10-11

## 2024-10-26 DIAGNOSIS — I10 PRIMARY HYPERTENSION: ICD-10-CM

## 2024-10-26 DIAGNOSIS — E11.21 TYPE 2 DIABETES MELLITUS WITH DIABETIC NEPHROPATHY, WITHOUT LONG-TERM CURRENT USE OF INSULIN (HCC): ICD-10-CM

## 2024-10-28 RX ORDER — ROSUVASTATIN CALCIUM 5 MG/1
5 TABLET, COATED ORAL NIGHTLY
Qty: 90 TABLET | Refills: 0 | Status: SHIPPED | OUTPATIENT
Start: 2024-10-28

## 2024-10-28 RX ORDER — LISINOPRIL 2.5 MG/1
2.5 TABLET ORAL DAILY
Qty: 90 TABLET | Refills: 0 | Status: SHIPPED | OUTPATIENT
Start: 2024-10-28

## 2024-10-30 ENCOUNTER — TELEPHONE (OUTPATIENT)
Dept: INTERNAL MEDICINE CLINIC | Age: 59
End: 2024-10-30

## 2024-10-30 NOTE — TELEPHONE ENCOUNTER
Denied  PA Detail   Note from payer: Your PA request has been denied. Additional information will be provided in the denial communication. (Message 1140) Your PA request has been denied. Additional information will be provided in the denial communication. (Message 1140)  Payer: Auto Search Patient's Payer Case ID: [id number]    [phone]  Electronic appeal: Not supported  Prior auth initiated by: [facility]/pharmacy #[id number] - [place], [place] - [address] - P [phone] - F [phone]    [phone]  View History

## 2024-11-01 DIAGNOSIS — K21.9 GASTROESOPHAGEAL REFLUX DISEASE WITHOUT ESOPHAGITIS: Primary | ICD-10-CM

## 2024-12-03 ENCOUNTER — PATIENT MESSAGE (OUTPATIENT)
Dept: INTERNAL MEDICINE CLINIC | Age: 59
End: 2024-12-03

## 2024-12-03 DIAGNOSIS — I10 PRIMARY HYPERTENSION: ICD-10-CM

## 2024-12-03 DIAGNOSIS — E11.21 TYPE 2 DIABETES MELLITUS WITH DIABETIC NEPHROPATHY, WITHOUT LONG-TERM CURRENT USE OF INSULIN (HCC): ICD-10-CM

## 2024-12-03 RX ORDER — LISINOPRIL 2.5 MG/1
2.5 TABLET ORAL DAILY
Qty: 90 TABLET | Refills: 0 | Status: SHIPPED | OUTPATIENT
Start: 2024-12-03

## 2024-12-03 RX ORDER — ROSUVASTATIN CALCIUM 5 MG/1
5 TABLET, COATED ORAL NIGHTLY
Qty: 90 TABLET | Refills: 0 | Status: SHIPPED | OUTPATIENT
Start: 2024-12-03

## 2024-12-06 ENCOUNTER — TELEPHONE (OUTPATIENT)
Dept: INTERNAL MEDICINE CLINIC | Age: 59
End: 2024-12-06

## 2024-12-06 NOTE — TELEPHONE ENCOUNTER
Message    Pls call Mike, seen last in June and due this mo for appt, we'll also cory need to discuss alt therapy for chol as ins not covering crestor       Left all info on voicemail

## 2025-01-27 RX ORDER — SEMAGLUTIDE 1.34 MG/ML
INJECTION, SOLUTION SUBCUTANEOUS
Refills: 2 | OUTPATIENT
Start: 2025-01-27

## 2025-01-29 DIAGNOSIS — K21.9 GASTROESOPHAGEAL REFLUX DISEASE WITHOUT ESOPHAGITIS: ICD-10-CM

## 2025-01-29 DIAGNOSIS — I10 PRIMARY HYPERTENSION: ICD-10-CM

## 2025-01-29 DIAGNOSIS — E11.21 TYPE 2 DIABETES MELLITUS WITH DIABETIC NEPHROPATHY, WITHOUT LONG-TERM CURRENT USE OF INSULIN (HCC): ICD-10-CM

## 2025-01-29 RX ORDER — OMEPRAZOLE 20 MG/1
20 CAPSULE, DELAYED RELEASE ORAL
Qty: 90 CAPSULE | Refills: 0 | OUTPATIENT
Start: 2025-01-29

## 2025-01-30 RX ORDER — LISINOPRIL 2.5 MG/1
2.5 TABLET ORAL DAILY
Qty: 90 TABLET | Refills: 0 | OUTPATIENT
Start: 2025-01-30

## 2025-01-30 RX ORDER — ROSUVASTATIN CALCIUM 5 MG/1
5 TABLET, COATED ORAL NIGHTLY
Qty: 90 TABLET | Refills: 0 | OUTPATIENT
Start: 2025-01-30

## 2025-01-30 RX ORDER — ALBUTEROL SULFATE 90 UG/1
2 INHALANT RESPIRATORY (INHALATION) EVERY 6 HOURS PRN
Qty: 1 EACH | Refills: 3 | OUTPATIENT
Start: 2025-01-30

## 2025-01-30 RX ORDER — ACYCLOVIR 400 MG/1
TABLET ORAL
Qty: 3 EACH | Refills: 5 | OUTPATIENT
Start: 2025-01-30

## 2025-01-30 RX ORDER — OMEPRAZOLE 20 MG/1
20 CAPSULE, DELAYED RELEASE ORAL
Qty: 90 CAPSULE | Refills: 0 | OUTPATIENT
Start: 2025-01-30

## 2025-02-05 DIAGNOSIS — E11.21 TYPE 2 DIABETES MELLITUS WITH DIABETIC NEPHROPATHY, WITHOUT LONG-TERM CURRENT USE OF INSULIN (HCC): ICD-10-CM

## 2025-02-05 RX ORDER — SEMAGLUTIDE 2.68 MG/ML
INJECTION, SOLUTION SUBCUTANEOUS
Refills: 1 | OUTPATIENT
Start: 2025-02-05

## 2025-02-06 DIAGNOSIS — E11.21 TYPE 2 DIABETES MELLITUS WITH DIABETIC NEPHROPATHY, WITHOUT LONG-TERM CURRENT USE OF INSULIN (HCC): ICD-10-CM

## 2025-02-06 DIAGNOSIS — K21.9 GASTROESOPHAGEAL REFLUX DISEASE WITHOUT ESOPHAGITIS: ICD-10-CM

## 2025-02-07 RX ORDER — OMEPRAZOLE 20 MG/1
20 CAPSULE, DELAYED RELEASE ORAL
Qty: 90 CAPSULE | Refills: 0 | OUTPATIENT
Start: 2025-02-07

## 2025-02-07 RX ORDER — ROSUVASTATIN CALCIUM 5 MG/1
5 TABLET, COATED ORAL NIGHTLY
Qty: 90 TABLET | Refills: 0 | OUTPATIENT
Start: 2025-02-07

## 2025-02-16 DIAGNOSIS — I10 PRIMARY HYPERTENSION: ICD-10-CM

## 2025-02-16 DIAGNOSIS — E11.21 TYPE 2 DIABETES MELLITUS WITH DIABETIC NEPHROPATHY, WITHOUT LONG-TERM CURRENT USE OF INSULIN (HCC): ICD-10-CM

## 2025-02-16 DIAGNOSIS — K21.9 GASTROESOPHAGEAL REFLUX DISEASE WITHOUT ESOPHAGITIS: ICD-10-CM

## 2025-02-17 RX ORDER — LISINOPRIL 2.5 MG/1
2.5 TABLET ORAL DAILY
Qty: 90 TABLET | Refills: 0 | OUTPATIENT
Start: 2025-02-17

## 2025-02-17 RX ORDER — ACYCLOVIR 400 MG/1
TABLET ORAL
Qty: 3 EACH | Refills: 5 | OUTPATIENT
Start: 2025-02-17

## 2025-02-17 RX ORDER — ROSUVASTATIN CALCIUM 5 MG/1
5 TABLET, COATED ORAL NIGHTLY
Qty: 90 TABLET | Refills: 0 | OUTPATIENT
Start: 2025-02-17

## 2025-02-17 RX ORDER — OMEPRAZOLE 20 MG/1
20 CAPSULE, DELAYED RELEASE ORAL
Qty: 90 CAPSULE | Refills: 0 | OUTPATIENT
Start: 2025-02-17

## 2025-02-18 ASSESSMENT — PATIENT HEALTH QUESTIONNAIRE - PHQ9
SUM OF ALL RESPONSES TO PHQ QUESTIONS 1-9: 0
2. FEELING DOWN, DEPRESSED OR HOPELESS: NOT AT ALL
SUM OF ALL RESPONSES TO PHQ9 QUESTIONS 1 & 2: 0
1. LITTLE INTEREST OR PLEASURE IN DOING THINGS: NOT AT ALL
SUM OF ALL RESPONSES TO PHQ QUESTIONS 1-9: 0
SUM OF ALL RESPONSES TO PHQ QUESTIONS 1-9: 0
2. FEELING DOWN, DEPRESSED OR HOPELESS: NOT AT ALL
SUM OF ALL RESPONSES TO PHQ9 QUESTIONS 1 & 2: 0
SUM OF ALL RESPONSES TO PHQ QUESTIONS 1-9: 0
1. LITTLE INTEREST OR PLEASURE IN DOING THINGS: NOT AT ALL

## 2025-02-18 NOTE — PROGRESS NOTES
(DEXCOM G7 SENSOR) MISC USE AS DIRECTED 3 each 5    Continuous Glucose  (DEXCOM G7 ) SILVESTRE 1 each by Does not apply route continuous 3 each 5    hydrocortisone 2.5 % cream Apply topically 2 times daily. 20 g 0    blood glucose test strips (ASCENSIA AUTODISC VI;ONE TOUCH ULTRA TEST VI) strip 1 each by In Vitro route daily As needed. 100 each 3    ASPIRIN 81 PO Take 1 tablet by mouth      albuterol sulfate HFA (PROVENTIL HFA) 108 (90 Base) MCG/ACT inhaler Inhale 2 puffs into the lungs every 6 hours as needed for Wheezing 1 each 3     No current facility-administered medications for this visit.       Past Medical History:   Diagnosis Date    Abdominal pain, right lower quadrant     Asthma     pfts 2015    Bee sting allergies     COVID-19 virus infection     jan 2022, defers vaccine    Decreased libido     Diabetes mellitus with nephropathy (HCC)     bottoms out/hypoglycemia on glipizide, metformin causes severe diarrhea    Family history of coronary artery disease     Fatigue     Fatty liver disease, nonalcoholic     GERD (gastroesophageal reflux disease)     PREV ON OTC, SENDING SCRIPT OMEPRAZOLE 20MG/DAY 11/1/24    Hypertension     NEED EKG, CONSIDER CT CARDIAC SCORING    Lung nodule, multiple 2022    recheck CT stable 3/23- recheck 3/24.    NAFLD (nonalcoholic fatty liver disease)     noted on CT 3/22- mother w hx of cirrhosis    Screening for colorectal cancer     COLOGUARD NEG 7/24- RECHECK DUE 3 YRS     Past Surgical History:   Procedure Laterality Date    ROTATOR CUFF REPAIR Right 2005    right     SEPTOPLASTY  1990's    SHOULDER ARTHROSCOPY Left 1/31/2022    LEFT SHOULDER ARTHROSCOPY ROTATOR CUFF DEBRIDMENT VERSUS REPAIR, SUBACROMIAL DECOMPRESSION, DISTAL CLAVICLE RESECTION, BICEPS TENODESIS performed by Jarocho Samson DO at [facility] OR    UPPER GASTROINTESTINAL ENDOSCOPY N/A 2/21/2024    EGD FOREIGN BODY REMOVAL performed by Jayla Ashford MD at [facility] ENDOSCOPY     Family History

## 2025-02-19 ENCOUNTER — TELEPHONE (OUTPATIENT)
Dept: INTERNAL MEDICINE CLINIC | Age: 60
End: 2025-02-19

## 2025-02-19 ENCOUNTER — OFFICE VISIT (OUTPATIENT)
Dept: INTERNAL MEDICINE CLINIC | Age: 60
End: 2025-02-19
Payer: COMMERCIAL

## 2025-02-19 VITALS
SYSTOLIC BLOOD PRESSURE: 126 MMHG | OXYGEN SATURATION: 100 % | HEIGHT: 72 IN | WEIGHT: 211 LBS | DIASTOLIC BLOOD PRESSURE: 80 MMHG | BODY MASS INDEX: 28.58 KG/M2 | RESPIRATION RATE: 16 BRPM | HEART RATE: 75 BPM

## 2025-02-19 DIAGNOSIS — Z00.00 ENCOUNTER FOR WELL ADULT EXAM WITHOUT ABNORMAL FINDINGS: ICD-10-CM

## 2025-02-19 DIAGNOSIS — Z00.00 ROUTINE GENERAL MEDICAL EXAMINATION AT A HEALTH CARE FACILITY: Primary | ICD-10-CM

## 2025-02-19 DIAGNOSIS — E03.4 HYPOTHYROIDISM DUE TO ACQUIRED ATROPHY OF THYROID: Primary | ICD-10-CM

## 2025-02-19 DIAGNOSIS — K21.9 GASTROESOPHAGEAL REFLUX DISEASE WITHOUT ESOPHAGITIS: ICD-10-CM

## 2025-02-19 DIAGNOSIS — Z91.030 BEE STING ALLERGY: ICD-10-CM

## 2025-02-19 DIAGNOSIS — Z12.5 SCREENING FOR MALIGNANT NEOPLASM OF PROSTATE: ICD-10-CM

## 2025-02-19 DIAGNOSIS — I10 PRIMARY HYPERTENSION: ICD-10-CM

## 2025-02-19 DIAGNOSIS — E78.2 HYPERLIPEMIA, MIXED: ICD-10-CM

## 2025-02-19 DIAGNOSIS — E11.21 TYPE 2 DIABETES MELLITUS WITH DIABETIC NEPHROPATHY, WITHOUT LONG-TERM CURRENT USE OF INSULIN (HCC): Primary | ICD-10-CM

## 2025-02-19 DIAGNOSIS — E11.21 TYPE 2 DIABETES MELLITUS WITH DIABETIC NEPHROPATHY, WITHOUT LONG-TERM CURRENT USE OF INSULIN (HCC): ICD-10-CM

## 2025-02-19 LAB
A/G RATIO: 2 RATIO (ref 0.8–2.6)
ALBUMIN: 4.4 G/DL (ref 3.5–5.2)
ALP BLD-CCNC: 111 U/L (ref 23–144)
ALT SERPL-CCNC: 40 U/L (ref 0–60)
AST SERPL-CCNC: 25 U/L (ref 0–55)
BASOPHILS ABSOLUTE: 0.1 K/UL (ref 0–0.3)
BASOPHILS RELATIVE PERCENT: 1.4 % (ref 0–2)
BILIRUB SERPL-MCNC: 0.3 MG/DL (ref 0–1.2)
BUN / CREAT RATIO: 12 (ref 7–25)
BUN BLDV-MCNC: 13 MG/DL (ref 3–29)
CALCIUM SERPL-MCNC: 9.3 MG/DL (ref 8.5–10.5)
CHLORIDE BLD-SCNC: 104 MEQ/L (ref 96–110)
CHOLESTEROL, TOTAL: 99 MG/DL
CO2: 28 MEQ/L (ref 19–32)
CREAT SERPL-MCNC: 1.1 MG/DL (ref 0.5–1.2)
CREATININE URINE: 131.3 MG/DL
DIFFERENTIAL COUNT: ABNORMAL
EOSINOPHILS ABSOLUTE: 0.8 K/UL (ref 0–0.5)
EOSINOPHILS RELATIVE PERCENT: 10.2 % (ref 0–5)
ESTIMATED GLOMERULAR FILTRATION RATE CREATININE EQUATION: 78 MLS/MIN/1.73M2
FASTING STATUS: ABNORMAL
GLOBULIN: 2.2 G/DL (ref 1.9–3.6)
GLUCOSE BLD-MCNC: 147 MG/DL (ref 70–99)
HBA1C MFR BLD: 7.1 %
HCT VFR BLD CALC: 50.4 % (ref 37.5–51)
HDLC SERPL-MCNC: 46 MG/DL
HEMOGLOBIN: 16.9 G/DL (ref 13–17.7)
IMMATURE GRANS (ABS): 0.1 K/UL (ref 0–0.1)
IMMATURE GRANULOCYTES %: 0.6 %
LDL CHOLESTEROL: 46 MG/DL
LYMPHOCYTES ABSOLUTE: 2.1 K/UL (ref 0.9–4.1)
LYMPHOCYTES RELATIVE PERCENT: 26.5 % (ref 14–51)
MAGNESIUM: 2.1 MG/DL (ref 1.4–2.5)
MCH RBC QN AUTO: 30.6 PG (ref 26–34)
MCHC RBC AUTO-ENTMCNC: 33.5 G/DL (ref 30.7–35.5)
MCV RBC AUTO: 91.3 FL (ref 80–100)
MONOCYTES ABSOLUTE: 0.7 K/UL (ref 0.2–1)
MONOCYTES RELATIVE PERCENT: 8.6 % (ref 4–12)
NEUTROPHILS ABSOLUTE: 4.2 K/UL (ref 1.8–7.5)
NEUTROPHILS RELATIVE PERCENT: 52.7 % (ref 42–80)
PDW BLD-RTO: 12.2 %
PLATELET # BLD: 240 K/UL (ref 140–400)
PMV BLD AUTO: 9.7 FL (ref 7.2–11.7)
POTASSIUM SERPL-SCNC: 4.6 MEQ/L (ref 3.4–5.3)
PROSTATE SPECIFIC ANTIGEN: 1.05 NG/ML
PROTEIN, URINE, RANDOM: 14 MG/DL
PROTEIN/CREAT RATIO URINE RAN: 0.11 MG/MG (ref 0.01–0.17)
RBC # BLD: 5.52 M/UL (ref 4.14–5.8)
RETICULOCYTE ABSOLUTE COUNT: 0 /100 WBC
SODIUM BLD-SCNC: 140 MEQ/L (ref 135–148)
TOTAL PROTEIN: 6.6 G/DL (ref 6–8.3)
TRIGL SERPL-MCNC: 34 MG/DL
TSH ULTRASENSITIVE: 4.97 MCIU/ML (ref 0.4–4.5)
VLDLC SERPL CALC-MCNC: 7 MG/DL (ref 4–38)
WBC # BLD: 7.9 K/UL (ref 3.5–10.9)

## 2025-02-19 PROCEDURE — 3079F DIAST BP 80-89 MM HG: CPT | Performed by: INTERNAL MEDICINE

## 2025-02-19 PROCEDURE — 3074F SYST BP LT 130 MM HG: CPT | Performed by: INTERNAL MEDICINE

## 2025-02-19 PROCEDURE — 83036 HEMOGLOBIN GLYCOSYLATED A1C: CPT | Performed by: INTERNAL MEDICINE

## 2025-02-19 PROCEDURE — 99396 PREV VISIT EST AGE 40-64: CPT | Performed by: INTERNAL MEDICINE

## 2025-02-19 RX ORDER — EPINEPHRINE 0.3 MG/.3ML
0.3 INJECTION SUBCUTANEOUS ONCE
Qty: 0.3 ML | Refills: 0 | Status: SHIPPED | OUTPATIENT
Start: 2025-02-19 | End: 2025-02-19

## 2025-02-19 RX ORDER — PRAVASTATIN SODIUM 10 MG
10 TABLET ORAL DAILY
Qty: 90 TABLET | Refills: 3 | Status: SHIPPED | OUTPATIENT
Start: 2025-02-19

## 2025-02-19 RX ORDER — ROSUVASTATIN CALCIUM 5 MG/1
5 TABLET, COATED ORAL NIGHTLY
Qty: 90 TABLET | Refills: 3 | Status: SHIPPED | OUTPATIENT
Start: 2025-02-19

## 2025-02-19 RX ORDER — OMEPRAZOLE 20 MG/1
20 CAPSULE, DELAYED RELEASE ORAL
Qty: 90 CAPSULE | Refills: 3 | Status: SHIPPED | OUTPATIENT
Start: 2025-02-19

## 2025-02-19 RX ORDER — LISINOPRIL 2.5 MG/1
2.5 TABLET ORAL DAILY
Qty: 90 TABLET | Refills: 0 | Status: SHIPPED | OUTPATIENT
Start: 2025-02-19

## 2025-02-19 RX ORDER — LEVOTHYROXINE SODIUM 25 UG/1
25 TABLET ORAL DAILY
Qty: 90 TABLET | Refills: 0 | Status: SHIPPED | OUTPATIENT
Start: 2025-02-19

## 2025-02-19 RX ORDER — ACYCLOVIR 400 MG/1
1 TABLET ORAL CONTINUOUS
Qty: 3 EACH | Refills: 11 | Status: SHIPPED | OUTPATIENT
Start: 2025-02-19

## 2025-02-19 SDOH — ECONOMIC STABILITY: FOOD INSECURITY: WITHIN THE PAST 12 MONTHS, YOU WORRIED THAT YOUR FOOD WOULD RUN OUT BEFORE YOU GOT MONEY TO BUY MORE.: NEVER TRUE

## 2025-02-19 SDOH — ECONOMIC STABILITY: FOOD INSECURITY: WITHIN THE PAST 12 MONTHS, THE FOOD YOU BOUGHT JUST DIDN'T LAST AND YOU DIDN'T HAVE MONEY TO GET MORE.: NEVER TRUE

## 2025-02-19 NOTE — TELEPHONE ENCOUNTER
Denied  PA Detail   Note from payer: Your PA request has been denied. Additional information will be provided in the denial communication. (Message 1140) Your PA request has been denied. Additional information will be provided in the denial communication. (Message 1140)  Payer: Auto Search Patient's Payer Case ID: [id number]    [phone]  Electronic appeal: Not supported  Prior auth initiated by: [facility]/pharmacy #[id number] - [place], [place] - [address] - P [phone] - F [phone]    [phone]  View History

## 2025-02-19 NOTE — RESULT ENCOUNTER NOTE
Chol, sugars, labs appear in stable range, DM is controlled, psa also nl.  Only issue on lab is he also has new onset low thyroid or hypothyroid.  I'll start a thyroid medication synthroid, then need recheck tsh and free t4 in 3mo, dx hypothyroid.  Add free t4 and antiperoxidase antibody too for this lab drawn today 2/19, dx hypothyroid  thx.   notify pt please.  Lastly, w start of both new meds pravastatin and also synthroid, rec we sched for f/U appt w me in 3 months.

## 2025-02-19 NOTE — TELEPHONE ENCOUNTER
Called and notified pt- Pt is okay to start pravastatin and pt has already got his labs done today

## 2025-02-21 ENCOUNTER — TELEPHONE (OUTPATIENT)
Dept: INTERNAL MEDICINE CLINIC | Age: 60
End: 2025-02-21

## 2025-02-21 DIAGNOSIS — E03.4 HYPOTHYROIDISM DUE TO ACQUIRED ATROPHY OF THYROID: Primary | ICD-10-CM

## 2025-02-21 NOTE — TELEPHONE ENCOUNTER
----- Message from Dr. Po Rowe MD sent at 2/19/2025  4:35 PM EST -----  Chol, sugars, labs appear in stable range, DM is controlled, psa also nl.  Only issue on lab is he also has new onset low thyroid or hypothyroid.  I'll start a thyroid medication synthroid, then need recheck tsh and free t4 in 3mo, dx hypothyroid.  Add free t4 and antiperoxidase antibody too for this lab drawn today 2/19, dx hypothyroid  thx.   notify pt please.  Lastly, w start of both new meds pravastatin and also synthroid, rec we sched for f/U appt w me in 3 months.

## 2025-02-26 DIAGNOSIS — R68.82 DECREASED LIBIDO: Primary | ICD-10-CM

## 2025-03-07 LAB
TESTOSTERONE FREE-MALE: 37.2 PG/ML (ref 35–155)
TESTOSTERONE TOTAL-MALE: 224 NG/DL (ref 250–1100)

## 2025-03-09 ENCOUNTER — RESULTS FOLLOW-UP (OUTPATIENT)
Dept: INTERNAL MEDICINE CLINIC | Age: 60
End: 2025-03-09

## 2025-03-09 DIAGNOSIS — E29.1 TESTOSTERONE DEFICIENCY IN MALE: Primary | ICD-10-CM

## 2025-03-10 NOTE — TELEPHONE ENCOUNTER
----- Message from Dr. Po Rowe MD sent at 3/9/2025  9:28 PM EDT -----  Please CALL OR TEXT pt, Mike had borderline low testosterone, but I am confident he can readily get this back to nl range w regular exercise and also wt loss.  Would suggest some wt resistance exercise mixed w cardiovasc exercise 5d/week for at lest 20-30min duration, also trying to lose 10# in the next 3mo, working on lower carbs and incr lean protein.  We should then recheck free and total testosterone levels in 3mo, dx testost defic.  If normalizes we'll just keep f;u as sched for next yr but if still low we'll then need f/u to do additional testing and consider replacement therapy.

## 2025-03-10 NOTE — RESULT ENCOUNTER NOTE
Please CALL OR TEXT pt, Mike had borderline low testosterone, but I am confident he can readily get this back to nl range w regular exercise and also wt loss.  Would suggest some wt resistance exercise mixed w cardiovasc exercise 5d/week for at lest 20-30min duration, also trying to lose 10# in the next 3mo, working on lower carbs and incr lean protein.  We should then recheck free and total testosterone levels in 3mo, dx testost defic.  If normalizes we'll just keep f;u as sched for next yr but if still low we'll then need f/u to do additional testing and consider replacement therapy.

## 2025-03-20 DIAGNOSIS — E11.21 TYPE 2 DIABETES MELLITUS WITH DIABETIC NEPHROPATHY, WITHOUT LONG-TERM CURRENT USE OF INSULIN (HCC): ICD-10-CM

## 2025-03-20 RX ORDER — ACYCLOVIR 400 MG/1
1 TABLET ORAL CONTINUOUS
Qty: 3 EACH | Refills: 11 | Status: SHIPPED | OUTPATIENT
Start: 2025-03-20

## 2025-03-20 NOTE — TELEPHONE ENCOUNTER
[facility] stated for the dexcom G7 sensor they needed the directions in the SIG.    Corrected and pended below

## 2025-05-05 DIAGNOSIS — I10 PRIMARY HYPERTENSION: ICD-10-CM

## 2025-05-05 DIAGNOSIS — E11.21 TYPE 2 DIABETES MELLITUS WITH DIABETIC NEPHROPATHY, WITHOUT LONG-TERM CURRENT USE OF INSULIN (HCC): ICD-10-CM

## 2025-05-05 DIAGNOSIS — E03.4 HYPOTHYROIDISM DUE TO ACQUIRED ATROPHY OF THYROID: ICD-10-CM

## 2025-05-05 DIAGNOSIS — K21.9 GASTROESOPHAGEAL REFLUX DISEASE WITHOUT ESOPHAGITIS: ICD-10-CM

## 2025-05-05 RX ORDER — ACYCLOVIR 400 MG/1
1 TABLET ORAL CONTINUOUS
Qty: 3 EACH | Refills: 11 | Status: SHIPPED | OUTPATIENT
Start: 2025-05-05

## 2025-05-05 RX ORDER — LEVOTHYROXINE SODIUM 25 UG/1
25 TABLET ORAL DAILY
Qty: 90 TABLET | Refills: 0 | Status: SHIPPED | OUTPATIENT
Start: 2025-05-05

## 2025-05-05 RX ORDER — OMEPRAZOLE 20 MG/1
20 CAPSULE, DELAYED RELEASE ORAL
Qty: 90 CAPSULE | Refills: 3 | Status: SHIPPED | OUTPATIENT
Start: 2025-05-05

## 2025-05-05 RX ORDER — PRAVASTATIN SODIUM 10 MG
10 TABLET ORAL DAILY
Qty: 90 TABLET | Refills: 3 | Status: SHIPPED | OUTPATIENT
Start: 2025-05-05

## 2025-05-05 RX ORDER — LISINOPRIL 2.5 MG/1
2.5 TABLET ORAL DAILY
Qty: 90 TABLET | Refills: 0 | Status: SHIPPED | OUTPATIENT
Start: 2025-05-05

## 2025-05-05 RX ORDER — ROSUVASTATIN CALCIUM 5 MG/1
5 TABLET, COATED ORAL NIGHTLY
Qty: 90 TABLET | Refills: 3 | Status: SHIPPED | OUTPATIENT
Start: 2025-05-05

## 2025-07-18 DIAGNOSIS — E11.21 TYPE 2 DIABETES MELLITUS WITH DIABETIC NEPHROPATHY, WITHOUT LONG-TERM CURRENT USE OF INSULIN (HCC): ICD-10-CM

## 2025-08-02 DIAGNOSIS — E03.4 HYPOTHYROIDISM DUE TO ACQUIRED ATROPHY OF THYROID: ICD-10-CM

## 2025-08-04 RX ORDER — LEVOTHYROXINE SODIUM 25 UG/1
25 TABLET ORAL DAILY
Qty: 90 TABLET | Refills: 1 | Status: SHIPPED | OUTPATIENT
Start: 2025-08-04

## 2025-08-22 DIAGNOSIS — E11.21 TYPE 2 DIABETES MELLITUS WITH DIABETIC NEPHROPATHY, WITHOUT LONG-TERM CURRENT USE OF INSULIN (HCC): ICD-10-CM

## 2025-08-22 DIAGNOSIS — I10 PRIMARY HYPERTENSION: ICD-10-CM

## 2025-08-22 RX ORDER — LISINOPRIL 2.5 MG/1
2.5 TABLET ORAL DAILY
Qty: 90 TABLET | Refills: 1 | Status: SHIPPED | OUTPATIENT
Start: 2025-08-22

## 2025-08-29 DIAGNOSIS — E11.21 TYPE 2 DIABETES MELLITUS WITH DIABETIC NEPHROPATHY, WITHOUT LONG-TERM CURRENT USE OF INSULIN (HCC): ICD-10-CM

## (undated) DEVICE — 3M™ STERI-DRAPE™ INCISE DRAPE 1050 (60CM X 45CM): Brand: STERI-DRAPE™

## (undated) DEVICE — 3M™ STERI-DRAPE™ U-DRAPE 1015: Brand: STERI-DRAPE™

## (undated) DEVICE — CANNULA ARTHSCP L7CM DIA7MM TRNSLUC THRD FLX W/ NO SQUIRT

## (undated) DEVICE — INTENDED FOR TISSUE SEPARATION, AND OTHER PROCEDURES THAT REQUIRE A SHARP SURGICAL BLADE TO PUNCTURE OR CUT.: Brand: BARD-PARKER ® STAINLESS STEEL BLADES

## (undated) DEVICE — SUTURE FIBERLINK SZ 2-0 L26IN NONABSORBABLE BLU CLS LOOP AR7235

## (undated) DEVICE — TUBING PMP L16FT MAIN DISP FOR AR-6400 AR-6475

## (undated) DEVICE — GLOVE ORANGE PI 8   MSG9080

## (undated) DEVICE — 3M™ STERI-DRAPE™ U-DRAPE 1067 1067 5/BX 4BX/CS/CTN&#X20;: Brand: STERI-DRAPE™

## (undated) DEVICE — PAD,ABDOMINAL,5"X9",ST,LF,25/BX: Brand: MEDLINE INDUSTRIES, INC.

## (undated) DEVICE — DRESSING,GAUZE,XEROFORM,CURAD,1"X8",ST: Brand: CURAD

## (undated) DEVICE — GAUZE,SPONGE,4"X4",16PLY,XRAY,STRL,LF: Brand: MEDLINE

## (undated) DEVICE — CLASSIC CLD THER SYS

## (undated) DEVICE — GLOVE SURG SZ 7 CRM LTX FREE POLYISOPRENE POLYMER BEAD ANTI

## (undated) DEVICE — SOLUTION IV IRRIG WATER 1000ML POUR BRL 2F7114

## (undated) DEVICE — THIN OFFSET (9.0 X 0.38 X 25.0MM)

## (undated) DEVICE — GOWN,ECLIPSE,POLYRNF,BRTHSLV,L,30/CS: Brand: MEDLINE

## (undated) DEVICE — MAT FLOOR ULTRA ABS 28X48IN

## (undated) DEVICE — GLOVE SURG SZ 75 L12IN THK75MIL DK GRN LTX FREE

## (undated) DEVICE — NEEDLE SPNL L3.5IN PNK HUB S STL REG WALL FIT STYL W/ QNCKE

## (undated) DEVICE — PROTECTOR EYE PT SELF ADH NS OPT GRD LF

## (undated) DEVICE — DRAPE,U/ SHT,SPLIT,PLAS,STERIL: Brand: MEDLINE

## (undated) DEVICE — [AGGRESSIVE 6-FLUTE BARREL BUR, ARTHROSCOPIC SHAVER BLADE,  DO NOT RESTERILIZE,  DO NOT USE IF PACKAGE IS DAMAGED,  KEEP DRY,  KEEP AWAY FROM SUNLIGHT]: Brand: FORMULA

## (undated) DEVICE — IMMOBILIZER SHLDR SWTH UNIV DEV BLK P.A.D. III

## (undated) DEVICE — [AGGRESSIVE PLUS CUTTER, ARTHROSCOPIC SHAVER BLADE,  DO NOT RESTERILIZE,  DO NOT USE IF PACKAGE IS DAMAGED,  KEEP DRY,  KEEP AWAY FROM SUNLIGHT]: Brand: FORMULA

## (undated) DEVICE — BLADE SAW RESECTOR CUT 4MM

## (undated) DEVICE — TUBING, SUCTION, 9/32" X 10', STRAIGHT: Brand: MEDLINE

## (undated) DEVICE — SHOULDER STABILIZATION KIT,                                    DISPOSABLE 12 PER BOX

## (undated) DEVICE — Z DUPLICATE USE 2360676 RETRIEVER ENDOSCP UNIV 4X5.5 CM 2.5 MMX230 CM PLAT ROTH NET

## (undated) DEVICE — TOWEL,OR,DSP,ST,BLUE,STD,6/PK,12PK/CS: Brand: MEDLINE

## (undated) DEVICE — 3M™ IOBAN™ 2 ANTIMICROBIAL INCISE DRAPE 6650EZ: Brand: IOBAN™ 2

## (undated) DEVICE — SPONGE GZ W4XL8IN COT WVN 12 PLY

## (undated) DEVICE — GLOVE SURG SZ 65 L12IN FNGR THK79MIL GRN LTX FREE

## (undated) DEVICE — ACUSNARE POLYPECTOMY SNARE: Brand: ACUSNARE

## (undated) DEVICE — GLOVE SURG SZ 65 THK91MIL LTX FREE SYN POLYISOPRENE

## (undated) DEVICE — LINER,SEMI-RIGID,3000CC,50EA/CS: Brand: MEDLINE

## (undated) DEVICE — GLOVE SURG SZ 8 L12IN THK75MIL DK GRN LTX FREE

## (undated) DEVICE — 3M™ MICROFOAM™ TAPE 1528-4: Brand: 3M™ MICROFOAM™

## (undated) DEVICE — SUTURE ETHLN SZ 3-0 L18IN NONABSORBABLE BLK FS-1 L24MM 3/8 663H

## (undated) DEVICE — CHLORAPREP 26ML ORANGE